# Patient Record
Sex: MALE | Race: WHITE | NOT HISPANIC OR LATINO | Employment: UNEMPLOYED | URBAN - METROPOLITAN AREA
[De-identification: names, ages, dates, MRNs, and addresses within clinical notes are randomized per-mention and may not be internally consistent; named-entity substitution may affect disease eponyms.]

---

## 2022-10-20 ENCOUNTER — HOSPITAL ENCOUNTER (INPATIENT)
Facility: HOSPITAL | Age: 32
LOS: 2 days | Discharge: HOME | DRG: 392 | End: 2022-10-22
Attending: EMERGENCY MEDICINE | Admitting: COLON & RECTAL SURGERY
Payer: MEDICAID

## 2022-10-20 DIAGNOSIS — K57.92 ACUTE DIVERTICULITIS: Primary | ICD-10-CM

## 2022-10-20 DIAGNOSIS — F32.A DEPRESSION, UNSPECIFIED DEPRESSION TYPE: ICD-10-CM

## 2022-10-20 LAB
ALBUMIN SERPL BCP-MCNC: 4.4 G/DL (ref 3.5–5.2)
ALP SERPL-CCNC: 89 U/L (ref 55–135)
ALT SERPL W/O P-5'-P-CCNC: 54 U/L (ref 10–44)
AMPHET+METHAMPHET UR QL: NEGATIVE
ANION GAP SERPL CALC-SCNC: 11 MMOL/L (ref 8–16)
AST SERPL-CCNC: 22 U/L (ref 10–40)
BARBITURATES UR QL SCN>200 NG/ML: NEGATIVE
BASOPHILS # BLD AUTO: 0.09 K/UL (ref 0–0.2)
BASOPHILS NFR BLD: 0.5 % (ref 0–1.9)
BENZODIAZ UR QL SCN>200 NG/ML: NEGATIVE
BILIRUB SERPL-MCNC: 0.7 MG/DL (ref 0.1–1)
BILIRUB UR QL STRIP: NEGATIVE
BUN SERPL-MCNC: 14 MG/DL (ref 6–20)
BZE UR QL SCN: NEGATIVE
CALCIUM SERPL-MCNC: 10.2 MG/DL (ref 8.7–10.5)
CANNABINOIDS UR QL SCN: ABNORMAL
CHLORIDE SERPL-SCNC: 106 MMOL/L (ref 95–110)
CLARITY UR: CLEAR
CO2 SERPL-SCNC: 23 MMOL/L (ref 23–29)
COLOR UR: COLORLESS
CREAT SERPL-MCNC: 1 MG/DL (ref 0.5–1.4)
CREAT UR-MCNC: 43.3 MG/DL (ref 23–375)
DIFFERENTIAL METHOD: ABNORMAL
EOSINOPHIL # BLD AUTO: 0.2 K/UL (ref 0–0.5)
EOSINOPHIL NFR BLD: 1.2 % (ref 0–8)
ERYTHROCYTE [DISTWIDTH] IN BLOOD BY AUTOMATED COUNT: 11.9 % (ref 11.5–14.5)
EST. GFR  (NO RACE VARIABLE): >60 ML/MIN/1.73 M^2
GLUCOSE SERPL-MCNC: 87 MG/DL (ref 70–110)
GLUCOSE UR QL STRIP: NEGATIVE
HCT VFR BLD AUTO: 46.7 % (ref 40–54)
HGB BLD-MCNC: 16.3 G/DL (ref 14–18)
HGB UR QL STRIP: NEGATIVE
IMM GRANULOCYTES # BLD AUTO: 0.05 K/UL (ref 0–0.04)
IMM GRANULOCYTES NFR BLD AUTO: 0.3 % (ref 0–0.5)
KETONES UR QL STRIP: NEGATIVE
LEUKOCYTE ESTERASE UR QL STRIP: NEGATIVE
LIPASE SERPL-CCNC: 37 U/L (ref 4–60)
LYMPHOCYTES # BLD AUTO: 3.4 K/UL (ref 1–4.8)
LYMPHOCYTES NFR BLD: 20.4 % (ref 18–48)
MCH RBC QN AUTO: 29.6 PG (ref 27–31)
MCHC RBC AUTO-ENTMCNC: 34.9 G/DL (ref 32–36)
MCV RBC AUTO: 85 FL (ref 82–98)
METHADONE UR QL SCN>300 NG/ML: NEGATIVE
MONOCYTES # BLD AUTO: 1.2 K/UL (ref 0.3–1)
MONOCYTES NFR BLD: 7 % (ref 4–15)
NEUTROPHILS # BLD AUTO: 11.6 K/UL (ref 1.8–7.7)
NEUTROPHILS NFR BLD: 70.6 % (ref 38–73)
NITRITE UR QL STRIP: NEGATIVE
NRBC BLD-RTO: 0 /100 WBC
OPIATES UR QL SCN: NEGATIVE
PCP UR QL SCN>25 NG/ML: NEGATIVE
PH UR STRIP: 7 [PH] (ref 5–8)
PLATELET # BLD AUTO: 399 K/UL (ref 150–450)
PMV BLD AUTO: 9.4 FL (ref 9.2–12.9)
POCT GLUCOSE: 75 MG/DL (ref 70–110)
POCT GLUCOSE: 91 MG/DL (ref 70–110)
POTASSIUM SERPL-SCNC: 4.2 MMOL/L (ref 3.5–5.1)
PROT SERPL-MCNC: 8.1 G/DL (ref 6–8.4)
PROT UR QL STRIP: NEGATIVE
RBC # BLD AUTO: 5.5 M/UL (ref 4.6–6.2)
SARS-COV-2 RDRP RESP QL NAA+PROBE: NEGATIVE
SODIUM SERPL-SCNC: 140 MMOL/L (ref 136–145)
SP GR UR STRIP: 1.01 (ref 1–1.03)
TOXICOLOGY INFORMATION: ABNORMAL
URN SPEC COLLECT METH UR: ABNORMAL
UROBILINOGEN UR STRIP-ACNC: NEGATIVE EU/DL
WBC # BLD AUTO: 16.46 K/UL (ref 3.9–12.7)

## 2022-10-20 PROCEDURE — 21400001 HC TELEMETRY ROOM

## 2022-10-20 PROCEDURE — 63600175 PHARM REV CODE 636 W HCPCS: Performed by: COLON & RECTAL SURGERY

## 2022-10-20 PROCEDURE — 81003 URINALYSIS AUTO W/O SCOPE: CPT | Mod: 59 | Performed by: NURSE PRACTITIONER

## 2022-10-20 PROCEDURE — 80053 COMPREHEN METABOLIC PANEL: CPT | Performed by: NURSE PRACTITIONER

## 2022-10-20 PROCEDURE — 63600175 PHARM REV CODE 636 W HCPCS: Performed by: NURSE PRACTITIONER

## 2022-10-20 PROCEDURE — 25000003 PHARM REV CODE 250: Performed by: COLON & RECTAL SURGERY

## 2022-10-20 PROCEDURE — 99285 EMERGENCY DEPT VISIT HI MDM: CPT | Mod: 25

## 2022-10-20 PROCEDURE — 99223 1ST HOSP IP/OBS HIGH 75: CPT | Mod: ,,, | Performed by: COLON & RECTAL SURGERY

## 2022-10-20 PROCEDURE — 80307 DRUG TEST PRSMV CHEM ANLYZR: CPT | Performed by: EMERGENCY MEDICINE

## 2022-10-20 PROCEDURE — 63600175 PHARM REV CODE 636 W HCPCS: Performed by: EMERGENCY MEDICINE

## 2022-10-20 PROCEDURE — S0030 INJECTION, METRONIDAZOLE: HCPCS | Performed by: COLON & RECTAL SURGERY

## 2022-10-20 PROCEDURE — 96365 THER/PROPH/DIAG IV INF INIT: CPT

## 2022-10-20 PROCEDURE — U0002 COVID-19 LAB TEST NON-CDC: HCPCS | Performed by: EMERGENCY MEDICINE

## 2022-10-20 PROCEDURE — 25000003 PHARM REV CODE 250: Performed by: EMERGENCY MEDICINE

## 2022-10-20 PROCEDURE — 83690 ASSAY OF LIPASE: CPT | Performed by: NURSE PRACTITIONER

## 2022-10-20 PROCEDURE — 96375 TX/PRO/DX INJ NEW DRUG ADDON: CPT

## 2022-10-20 PROCEDURE — G0378 HOSPITAL OBSERVATION PER HR: HCPCS

## 2022-10-20 PROCEDURE — S0030 INJECTION, METRONIDAZOLE: HCPCS | Performed by: EMERGENCY MEDICINE

## 2022-10-20 PROCEDURE — 99223 PR INITIAL HOSPITAL CARE,LEVL III: ICD-10-PCS | Mod: ,,, | Performed by: COLON & RECTAL SURGERY

## 2022-10-20 PROCEDURE — 96367 TX/PROPH/DG ADDL SEQ IV INF: CPT

## 2022-10-20 PROCEDURE — 96361 HYDRATE IV INFUSION ADD-ON: CPT

## 2022-10-20 PROCEDURE — 85025 COMPLETE CBC W/AUTO DIFF WBC: CPT | Performed by: NURSE PRACTITIONER

## 2022-10-20 RX ORDER — BUPROPION HYDROCHLORIDE 150 MG/1
150 TABLET ORAL EVERY MORNING
COMMUNITY
End: 2023-01-03

## 2022-10-20 RX ORDER — ESCITALOPRAM OXALATE 10 MG/1
10 TABLET ORAL NIGHTLY
Status: DISCONTINUED | OUTPATIENT
Start: 2022-10-20 | End: 2022-10-22 | Stop reason: HOSPADM

## 2022-10-20 RX ORDER — ONDANSETRON 2 MG/ML
4 INJECTION INTRAMUSCULAR; INTRAVENOUS EVERY 6 HOURS PRN
Status: DISCONTINUED | OUTPATIENT
Start: 2022-10-20 | End: 2022-10-22 | Stop reason: HOSPADM

## 2022-10-20 RX ORDER — MORPHINE SULFATE 4 MG/ML
2 INJECTION, SOLUTION INTRAMUSCULAR; INTRAVENOUS
Status: DISCONTINUED | OUTPATIENT
Start: 2022-10-20 | End: 2022-10-21

## 2022-10-20 RX ORDER — KETOROLAC TROMETHAMINE 30 MG/ML
30 INJECTION, SOLUTION INTRAMUSCULAR; INTRAVENOUS
Status: COMPLETED | OUTPATIENT
Start: 2022-10-20 | End: 2022-10-20

## 2022-10-20 RX ORDER — GABAPENTIN 300 MG/1
300 CAPSULE ORAL 3 TIMES DAILY
COMMUNITY
End: 2023-01-03

## 2022-10-20 RX ORDER — SODIUM CHLORIDE, SODIUM LACTATE, POTASSIUM CHLORIDE, CALCIUM CHLORIDE 600; 310; 30; 20 MG/100ML; MG/100ML; MG/100ML; MG/100ML
INJECTION, SOLUTION INTRAVENOUS CONTINUOUS
Status: DISCONTINUED | OUTPATIENT
Start: 2022-10-20 | End: 2022-10-22

## 2022-10-20 RX ORDER — SODIUM CHLORIDE 9 MG/ML
1000 INJECTION, SOLUTION INTRAVENOUS
Status: COMPLETED | OUTPATIENT
Start: 2022-10-20 | End: 2022-10-20

## 2022-10-20 RX ORDER — MORPHINE SULFATE 4 MG/ML
4 INJECTION, SOLUTION INTRAMUSCULAR; INTRAVENOUS
Status: DISCONTINUED | OUTPATIENT
Start: 2022-10-20 | End: 2022-10-21

## 2022-10-20 RX ORDER — TALC
6 POWDER (GRAM) TOPICAL NIGHTLY PRN
Status: DISCONTINUED | OUTPATIENT
Start: 2022-10-20 | End: 2022-10-22 | Stop reason: HOSPADM

## 2022-10-20 RX ORDER — ONDANSETRON 2 MG/ML
4 INJECTION INTRAMUSCULAR; INTRAVENOUS
Status: COMPLETED | OUTPATIENT
Start: 2022-10-20 | End: 2022-10-20

## 2022-10-20 RX ORDER — HYOSCYAMINE SULFATE 0.125 MG
125 TABLET ORAL EVERY 4 HOURS PRN
COMMUNITY
End: 2022-12-20 | Stop reason: SDUPTHER

## 2022-10-20 RX ORDER — CIPROFLOXACIN 2 MG/ML
400 INJECTION, SOLUTION INTRAVENOUS
Status: DISCONTINUED | OUTPATIENT
Start: 2022-10-20 | End: 2022-10-22

## 2022-10-20 RX ORDER — ACETAMINOPHEN 325 MG/1
650 TABLET ORAL EVERY 8 HOURS PRN
Status: DISCONTINUED | OUTPATIENT
Start: 2022-10-20 | End: 2022-10-22 | Stop reason: HOSPADM

## 2022-10-20 RX ORDER — SODIUM CHLORIDE 0.9 % (FLUSH) 0.9 %
10 SYRINGE (ML) INJECTION
Status: DISCONTINUED | OUTPATIENT
Start: 2022-10-20 | End: 2022-10-22 | Stop reason: HOSPADM

## 2022-10-20 RX ORDER — LIDOCAINE HYDROCHLORIDE 10 MG/ML
1 INJECTION INFILTRATION; PERINEURAL ONCE AS NEEDED
Status: DISCONTINUED | OUTPATIENT
Start: 2022-10-20 | End: 2022-10-22 | Stop reason: HOSPADM

## 2022-10-20 RX ORDER — ESCITALOPRAM OXALATE 10 MG/1
10 TABLET ORAL NIGHTLY
COMMUNITY
End: 2023-01-03

## 2022-10-20 RX ORDER — GABAPENTIN 300 MG/1
300 CAPSULE ORAL 3 TIMES DAILY
Status: DISCONTINUED | OUTPATIENT
Start: 2022-10-20 | End: 2022-10-22 | Stop reason: HOSPADM

## 2022-10-20 RX ORDER — MORPHINE SULFATE 4 MG/ML
4 INJECTION, SOLUTION INTRAMUSCULAR; INTRAVENOUS
Status: COMPLETED | OUTPATIENT
Start: 2022-10-20 | End: 2022-10-20

## 2022-10-20 RX ORDER — PRAZOSIN HYDROCHLORIDE 1 MG/1
2 CAPSULE ORAL NIGHTLY
Status: DISCONTINUED | OUTPATIENT
Start: 2022-10-20 | End: 2022-10-22 | Stop reason: HOSPADM

## 2022-10-20 RX ORDER — PRAZOSIN HYDROCHLORIDE 2 MG/1
2 CAPSULE ORAL NIGHTLY
COMMUNITY
End: 2023-01-03

## 2022-10-20 RX ORDER — BUPROPION HYDROCHLORIDE 150 MG/1
150 TABLET ORAL EVERY MORNING
Status: DISCONTINUED | OUTPATIENT
Start: 2022-10-21 | End: 2022-10-22 | Stop reason: HOSPADM

## 2022-10-20 RX ORDER — METOCLOPRAMIDE 5 MG/1
5 TABLET ORAL
COMMUNITY
End: 2022-11-28

## 2022-10-20 RX ORDER — METRONIDAZOLE 500 MG/100ML
500 INJECTION, SOLUTION INTRAVENOUS
Status: DISCONTINUED | OUTPATIENT
Start: 2022-10-20 | End: 2022-10-22 | Stop reason: HOSPADM

## 2022-10-20 RX ADMIN — ONDANSETRON 4 MG: 2 INJECTION INTRAMUSCULAR; INTRAVENOUS at 03:10

## 2022-10-20 RX ADMIN — PRAZOSIN HYDROCHLORIDE 2 MG: 1 CAPSULE ORAL at 08:10

## 2022-10-20 RX ADMIN — METRONIDAZOLE 500 MG: 5 INJECTION, SOLUTION INTRAVENOUS at 12:10

## 2022-10-20 RX ADMIN — KETOROLAC TROMETHAMINE 30 MG: 30 INJECTION, SOLUTION INTRAMUSCULAR; INTRAVENOUS at 11:10

## 2022-10-20 RX ADMIN — MORPHINE SULFATE 4 MG: 4 INJECTION INTRAVENOUS at 06:10

## 2022-10-20 RX ADMIN — ACETAMINOPHEN 650 MG: 325 TABLET ORAL at 08:10

## 2022-10-20 RX ADMIN — ONDANSETRON 4 MG: 2 INJECTION INTRAMUSCULAR; INTRAVENOUS at 08:10

## 2022-10-20 RX ADMIN — METRONIDAZOLE 500 MG: 5 INJECTION, SOLUTION INTRAVENOUS at 08:10

## 2022-10-20 RX ADMIN — SODIUM CHLORIDE 1000 ML: 0.9 INJECTION, SOLUTION INTRAVENOUS at 01:10

## 2022-10-20 RX ADMIN — ONDANSETRON 4 MG: 2 INJECTION INTRAMUSCULAR; INTRAVENOUS at 11:10

## 2022-10-20 RX ADMIN — CIPROFLOXACIN 400 MG: 2 INJECTION, SOLUTION INTRAVENOUS at 12:10

## 2022-10-20 RX ADMIN — SODIUM CHLORIDE 1000 ML: 0.9 INJECTION, SOLUTION INTRAVENOUS at 11:10

## 2022-10-20 RX ADMIN — MORPHINE SULFATE 4 MG: 4 INJECTION INTRAVENOUS at 12:10

## 2022-10-20 RX ADMIN — CIPROFLOXACIN 400 MG: 2 INJECTION, SOLUTION INTRAVENOUS at 11:10

## 2022-10-20 RX ADMIN — MORPHINE SULFATE 4 MG: 4 INJECTION INTRAVENOUS at 03:10

## 2022-10-20 RX ADMIN — ESCITALOPRAM OXALATE 10 MG: 10 TABLET ORAL at 08:10

## 2022-10-20 RX ADMIN — GABAPENTIN 300 MG: 300 CAPSULE ORAL at 08:10

## 2022-10-20 RX ADMIN — SODIUM CHLORIDE, POTASSIUM CHLORIDE, SODIUM LACTATE AND CALCIUM CHLORIDE: 600; 310; 30; 20 INJECTION, SOLUTION INTRAVENOUS at 08:10

## 2022-10-20 NOTE — FIRST PROVIDER EVALUATION
"Medical screening examination initiated.  I have conducted a focused provider triage encounter, findings are as follows:    Brief history of present illness:  reports lower abdominal pain with nausea     Vitals:    10/20/22 1020   BP: (!) 155/104   BP Location: Right arm   Patient Position: Sitting   Pulse: 82   Resp: 20   Temp: 98 °F (36.7 °C)   TempSrc: Oral   SpO2: 99%   Weight: 103.4 kg (227 lb 15.3 oz)   Height: 6' 3" (1.905 m)       Pertinent physical exam:  uncomfortable     Brief workup plan:  labs, meds, imaging     Preliminary workup initiated; this workup will be continued and followed by the physician or advanced practice provider that is assigned to the patient when roomed.  "

## 2022-10-20 NOTE — PHARMACY MED REC
"Admission Medication History     The home medication history was taken by Miguel Angel Willson.    You may go to "Admission" then "Reconcile Home Medications" tabs to review and/or act upon these items.     The home medication list has been updated by the Pharmacy department.   Please read ALL comments highlighted in yellow.   Please address this information as you see fit.    Feel free to contact us if you have any questions or require assistance.        Medications listed below were obtained from: Patient/family  (Not in a hospital admission)      Potential issues to be addressed PRIOR TO DISCHARGE: NONE    Miguel Angel Willson, Ingrid-Adv  Pharmacy Technician Specialist-Medication History  [facility] [phone]  Secure chat preferred     Current Outpatient Medications on File Prior to Encounter   Medication Sig Dispense Refill Last Dose    buPROPion (WELLBUTRIN XL) 150 MG TB24 tablet Take 150 mg by mouth every morning.   10/19/2022    EScitalopram oxalate (LEXAPRO) 10 MG tablet Take 10 mg by mouth every evening.   10/19/2022    gabapentin (NEURONTIN) 300 MG capsule Take 300 mg by mouth 3 (three) times daily.   10/19/2022    hyoscyamine (ANASPAZ,LEVSIN) 0.125 mg Tab Take 125 mcg by mouth every 4 (four) hours as needed (pain).   10/19/2022    metoclopramide HCl (REGLAN) 5 MG tablet Take 5 mg by mouth 2 (two) times daily before meals.   10/19/2022    prazosin (MINIPRESS) 2 MG Cap Take 2 mg by mouth every evening.   10/19/2022                           .        "

## 2022-10-20 NOTE — ASSESSMENT & PLAN NOTE
33yo M with acute perforated sigmoid diverticulitis with small fluid collection    - Admit to surgery  - NPO  - IV fluids  - IV abx  - no acute surgical intervention required at this time.  Patient does not have any peritoneal signs and can likely be treated with IV antibiotics.  If he worsens clinically, we discussed that he could require surgery during this hospitalization although we will try to avoid this.  He is amenable to this plan.  - OOB, ambulation

## 2022-10-20 NOTE — HPI
31yo M who presents emergency room with a 1 day history of left lower quadrant abdominal pain.  Patient states he had acute attack of this pain starting around 2:00 a.m. this morning.  Feels that the pain is sharp and stabbing in nature.  He does report previous history of diverticulitis in the past that is a few years ago.  He denies any fever or chills.  Endorses ongoing nausea and vomiting.  Denies any hematochezia or melena.  Has never had any abdominal surgical history.  Denies any previous colonoscopy.  Denies any family history of colorectal cancer.  Workup in the emergency room concerning for leukocytosis of 16k and a CT scan concerning for complicated sigmoid diverticulitis with an undrainable fluid collection that was 2 cm.

## 2022-10-20 NOTE — PLAN OF CARE
Pt admitted from ED to telemetry bed 214. Report received from SEBASTIAN Curry. Pt AAOx4. No tele monitor orders needed per MD Alonso. On room air; tolerating well. Pt turned and repositioned independently with use of pillows. Pt voids spontaneously without difficulty into urinal at bedside. 20 G PIV in right AC CDI with no redness, swelling, warmth, or drainage with one time dose of NS started in ED infusing at 125 ml/hr; hang LR gtt after current bag of NS finishes per MD Alonso; will notify night shift nurse. Bed low, wheels locked, alarms audible. Plan of care reviewed. Vital signs monitored. Fall precautions in place. Pain assessed. Safety promoted. Infection risks reduced.

## 2022-10-20 NOTE — ED PROVIDER NOTES
"SCRIBE #1 NOTE: I, Viridiana Eamon, am scribing for, and in the presence of, Elenita Chisholm MD. I have scribed the entire note.       History     Chief Complaint   Patient presents with    Abdominal Pain     Pt c/o lower middle abd pain and LLQ abd pain, +nausea, began last night, hx gastroparesis but states this pain is different     Review of patient's allergies indicates:  No Known Allergies      History of Present Illness     HPI    10/20/2022, 11:09 AM  History obtained from the patient      History of Present Illness: David Sevilla is a 32 y.o. male patient  who presents to the Emergency Department for evaluation of LLQ abdominal pain  which onset suddenly around 2AM. Pt describes pain as "feels like he has been stabbed" and a pressure.  Pt states he has a history of diverticulitis. Symptoms are constant and moderate in severity. No mitigating or exacerbating factors reported. Associated sxs include N/V and dysuria. Patient denies any fever, chills, difficulty urinating, blood in stool, hematuria , and all other sxs at this time. No prior Tx reported. No further complaints or concerns at this time.       Arrival mode: Personal vehicle      PCP: Primary Doctor No        Past Medical History:  No past medical history on file.    Past Surgical History:  No past surgical history on file.      Family History:  No family history on file.    Social History:  Social History     Tobacco Use    Smoking status: Not on file    Smokeless tobacco: Not on file   Substance and Sexual Activity    Alcohol use: Not on file    Drug use: Not on file    Sexual activity: Not on file        Review of Systems     Review of Systems   Constitutional:  Negative for chills and fever.   HENT:  Negative for sore throat.    Respiratory:  Negative for shortness of breath.    Cardiovascular:  Negative for chest pain.   Gastrointestinal:  Positive for abdominal pain (LLQ), nausea and vomiting. Negative for blood in stool. " "  Genitourinary:  Positive for dysuria. Negative for difficulty urinating and hematuria.   Musculoskeletal:  Negative for back pain.   Skin:  Negative for rash.   Neurological:  Negative for weakness.   Hematological:  Does not bruise/bleed easily.   All other systems reviewed and are negative.     Physical Exam     Initial Vitals [10/20/22 1020]   BP Pulse Resp Temp SpO2   (!) 155/104 82 20 98 °F (36.7 °C) 99 %      MAP       --          Physical Exam  Nursing Notes and Vital Signs Reviewed.  Constitutional: Patient is in no acute distress. Well-developed and well-nourished.  Head: Atraumatic. Normocephalic.  Eyes: PERRL. EOM intact. Conjunctivae are not pale. No scleral icterus.  ENT: Mucous membranes are moist. Oropharynx is clear and symmetric.    Neck: Supple. Full ROM. No lymphadenopathy.  Cardiovascular: Regular rate. Regular rhythm. No murmurs, rubs, or gallops. Distal pulses are 2+ and symmetric.  Pulmonary/Chest: No respiratory distress. Clear to auscultation bilaterally. No wheezing or rales.  Abdominal: Soft and non-distended. No rebound, guarding, or rigidity. LLQ tenderness.  Genitourinary: No CVA tenderness  Musculoskeletal: Moves all extremities. No obvious deformities. No edema. No calf tenderness.  Skin: Warm and dry.  Neurological:  Alert, awake, and appropriate.  Normal speech.  No acute focal neurological deficits are appreciated.  Psychiatric: Normal affect. Good eye contact. Appropriate in content.     ED Course   Procedures  ED Vital Signs:  Vitals:    10/20/22 1020 10/20/22 1130 10/20/22 1230 10/20/22 1240   BP: (!) 155/104 116/65 132/72    Pulse: 82 74 77    Resp: 20 18 18 20   Temp: 98 °F (36.7 °C)      TempSrc: Oral      SpO2: 99% 100% 100%    Weight: 103.4 kg (227 lb 15.3 oz)      Height: 6' 3" (1.905 m)       10/20/22 1300 10/20/22 1400   BP: (!) 141/81 123/74   Pulse: 80 82   Resp: 20 18   Temp:     TempSrc:     SpO2: 100% 95%   Weight:     Height:         Abnormal Lab Results:  Labs " Reviewed   CBC W/ AUTO DIFFERENTIAL - Abnormal; Notable for the following components:       Result Value    WBC 16.46 (*)     Gran # (ANC) 11.6 (*)     Immature Grans (Abs) 0.05 (*)     Mono # 1.2 (*)     All other components within normal limits   COMPREHENSIVE METABOLIC PANEL - Abnormal; Notable for the following components:    ALT 54 (*)     All other components within normal limits   URINALYSIS, REFLEX TO URINE CULTURE - Abnormal; Notable for the following components:    Color, UA Colorless (*)     All other components within normal limits    Narrative:     Specimen Source->Urine   DRUG SCREEN PANEL, URINE EMERGENCY - Abnormal; Notable for the following components:    THC Presumptive Positive (*)     All other components within normal limits    Narrative:     Specimen Source->Urine   LIPASE   SARS-COV-2 RNA AMPLIFICATION, QUAL        All Lab Results:  Results for orders placed or performed during the hospital encounter of 10/20/22   CBC auto differential   Result Value Ref Range    WBC 16.46 (H) 3.90 - 12.70 K/uL    RBC 5.50 4.60 - 6.20 M/uL    Hemoglobin 16.3 14.0 - 18.0 g/dL    Hematocrit 46.7 40.0 - 54.0 %    MCV 85 82 - 98 fL    MCH 29.6 27.0 - 31.0 pg    MCHC 34.9 32.0 - 36.0 g/dL    RDW 11.9 11.5 - 14.5 %    Platelets 399 150 - 450 K/uL    MPV 9.4 9.2 - 12.9 fL    Immature Granulocytes 0.3 0.0 - 0.5 %    Gran # (ANC) 11.6 (H) 1.8 - 7.7 K/uL    Immature Grans (Abs) 0.05 (H) 0.00 - 0.04 K/uL    Lymph # 3.4 1.0 - 4.8 K/uL    Mono # 1.2 (H) 0.3 - 1.0 K/uL    Eos # 0.2 0.0 - 0.5 K/uL    Baso # 0.09 0.00 - 0.20 K/uL    nRBC 0 0 /100 WBC    Gran % 70.6 38.0 - 73.0 %    Lymph % 20.4 18.0 - 48.0 %    Mono % 7.0 4.0 - 15.0 %    Eosinophil % 1.2 0.0 - 8.0 %    Basophil % 0.5 0.0 - 1.9 %    Differential Method Automated    Comprehensive metabolic panel   Result Value Ref Range    Sodium 140 136 - 145 mmol/L    Potassium 4.2 3.5 - 5.1 mmol/L    Chloride 106 95 - 110 mmol/L    CO2 23 23 - 29 mmol/L    Glucose 87 70 -  110 mg/dL    BUN 14 6 - 20 mg/dL    Creatinine 1.0 0.5 - 1.4 mg/dL    Calcium 10.2 8.7 - 10.5 mg/dL    Total Protein 8.1 6.0 - 8.4 g/dL    Albumin 4.4 3.5 - 5.2 g/dL    Total Bilirubin 0.7 0.1 - 1.0 mg/dL    Alkaline Phosphatase 89 55 - 135 U/L    AST 22 10 - 40 U/L    ALT 54 (H) 10 - 44 U/L    Anion Gap 11 8 - 16 mmol/L    eGFR >60 >60 mL/min/1.73 m^2   Lipase   Result Value Ref Range    Lipase 37 4 - 60 U/L   Urinalysis, Reflex to Urine Culture Urine, Clean Catch    Specimen: Urine   Result Value Ref Range    Specimen UA Urine, Clean Catch     Color, UA Colorless (A) Yellow, Straw, Phuong    Appearance, UA Clear Clear    pH, UA 7.0 5.0 - 8.0    Specific Gravity, UA 1.010 1.005 - 1.030    Protein, UA Negative Negative    Glucose, UA Negative Negative    Ketones, UA Negative Negative    Bilirubin (UA) Negative Negative    Occult Blood UA Negative Negative    Nitrite, UA Negative Negative    Urobilinogen, UA Negative <2.0 EU/dL    Leukocytes, UA Negative Negative   Drug screen panel, emergency   Result Value Ref Range    Benzodiazepines Negative Negative    Methadone metabolites Negative Negative    Cocaine (Metab.) Negative Negative    Opiate Scrn, Ur Negative Negative    Barbiturate Screen, Ur Negative Negative    Amphetamine Screen, Ur Negative Negative    THC Presumptive Positive (A) Negative    Phencyclidine Negative Negative    Creatinine, Urine 43.3 23.0 - 375.0 mg/dL    Toxicology Information SEE COMMENT    COVID-19 Rapid Screening   Result Value Ref Range    SARS-CoV-2 RNA, Amplification, Qual Negative Negative         Imaging Results:  Imaging Results              CT Renal Stone Study ABD Pelvis WO (Final result)  Result time 10/20/22 12:14:09      Final result by Dmitri Lebron MD (10/20/22 12:14:09)                   Impression:      Extensive descending and sigmoid diverticulosis with thickening inflammatory changes within the proximal sigmoid colon consistent with acute diverticulitis. No drainable  fluid collection. Small amount of fluid seen adjacent to the sigmoid colon measuring 2.2 x 1.6 cm. Recommend surgery consult and interval follow-up imaging with oral and IV contrast.    Evaluation of solid organ and vascular pathology is limited due to lack of IV contrast.    All CT scans at this facility use dose modulation, iterative reconstruction, and/or weight based dosing when appropriate to reduce radiation dose to as low as reasonable achievable.      Electronically signed by: Dmitri Lebron MD  Date:    10/20/2022  Time:    12:14               Narrative:    EXAMINATION:  CT RENAL STONE STUDY ABD PELVIS WO    CLINICAL HISTORY:  Flank pain, kidney stone suspected;    TECHNIQUE:  Low dose axial images, sagittal and coronal reformations were obtained from the lung bases to the pubic symphysis.  Oral contrast was not administered.    COMPARISON:  None    FINDINGS:  Heart: Normal size. No effusion.    Lung Bases: Clear.    Liver: Normal size.  Decreased attenuation consistent with hepatic steatosis..  No focal lesions.    Gallbladder: No calcified gallstones.    Bile Ducts: No dilatation.    Pancreas: No obvious mass. No peripancreatic fat stranding.    Spleen: Normal.    Adrenals: Normal.    Kidneys/Ureters: No mass, hydroureteronephrosis, or nephroureterolithiasis.    Bladder: No wall thickening.    Reproductive organs: Normal.    GI Tract/Mesentery: No evidence of bowel obstruction.  Extensive descending and sigmoid diverticulosis with thickening inflammatory changes within the proximal sigmoid colon consistent with acute diverticulitis.  No drainable fluid collection.  Small amount of fluid seen adjacent to the sigmoid colon measuring 2.2 x 1.6 cm.    Peritoneal Space: No ascites or free air.    Retroperitoneum: No significant adenopathy.    Abdominal wall: Normal.    Vasculature: No aneurysm.    Bones: No acute fracture. No suspicious lytic or sclerotic lesions.                                               The Emergency Provider reviewed the vital signs and test results, which are outlined above.     ED Discussion     12:53 PM: Discussed pt's case with Dr. Alonso (Colon and Rectal Surgery) who recommends admit observation, NPO, with IV abx (zosyn).    12:55 PM: Discussed case with Dr. Alonso (Colon and rectal surgery). Dr. Alonso agrees with current care and management of pt and accepts admission.   Admitting Service: Colon and rectal surgery   Admitting Physician: Dr. Alonso  Admit to: Obs     12:57 PM: Re-evaluated pt. I have discussed test results, shared treatment plan, and the need for admission with patient and family at bedside. Pt and family express understanding at this time and agree with all information. All questions answered. Pt and family have no further questions or concerns at this time. Pt is ready for admit.           Medical Decision Making:   Clinical Tests:   Lab Tests: Ordered and Reviewed  Radiological Study: Ordered and Reviewed         ED Medication(s):  Medications   ciprofloxacin (CIPRO)400mg/200ml D5W IVPB 400 mg (0 mg Intravenous Stopped 10/20/22 1334)   metronidazole IVPB 500 mg (0 mg Intravenous Stopped 10/20/22 1343)   ondansetron injection 4 mg (4 mg Intravenous Given 10/20/22 1121)   sodium chloride 0.9% bolus 1,000 mL (0 mLs Intravenous Stopped 10/20/22 1221)   ketorolac injection 30 mg (30 mg Intravenous Given 10/20/22 1121)   morphine injection 4 mg (4 mg Intravenous Given 10/20/22 1240)   0.9%  NaCl infusion (1,000 mLs Intravenous New Bag 10/20/22 1311)       New Prescriptions    No medications on file               Scribe Attestation:   Scribe #1: I performed the above scribed service and the documentation accurately describes the services I performed. I attest to the accuracy of the note.     Attending:   Physician Attestation Statement for Scribe #1: I, Elenita Chisholm MD, personally performed the services described in this documentation, as scribed by Viridiana Knutson, in my  presence, and it is both accurate and complete.           Clinical Impression       ICD-10-CM ICD-9-CM   1. Acute diverticulitis  K57.92 562.11       Disposition:   Disposition: Placed in Observation  Condition: Fair       Elenita Chisholm MD  10/20/22 6884

## 2022-10-20 NOTE — ED NOTES
Pt in NAD, Pt reports pain 5/10 and states his pain is tolerable at this time. VSS, Resp e/u. Stretcher locked in lowest position. Side rails up x2. Call bell within reach. Will continue to monitor.

## 2022-10-20 NOTE — SUBJECTIVE & OBJECTIVE
No current facility-administered medications on file prior to encounter.     Current Outpatient Medications on File Prior to Encounter   Medication Sig    buPROPion (WELLBUTRIN XL) 150 MG TB24 tablet Take 150 mg by mouth every morning.    EScitalopram oxalate (LEXAPRO) 10 MG tablet Take 10 mg by mouth every evening.    gabapentin (NEURONTIN) 300 MG capsule Take 300 mg by mouth 3 (three) times daily.    hyoscyamine (ANASPAZ,LEVSIN) 0.125 mg Tab Take 125 mcg by mouth every 4 (four) hours as needed (pain).    metoclopramide HCl (REGLAN) 5 MG tablet Take 5 mg by mouth 2 (two) times daily before meals.    prazosin (MINIPRESS) 2 MG Cap Take 2 mg by mouth every evening.       Review of patient's allergies indicates:  No Known Allergies    No past medical history on file.  No past surgical history on file.  Family History    None       Tobacco Use    Smoking status: Not on file    Smokeless tobacco: Not on file   Substance and Sexual Activity    Alcohol use: Not on file    Drug use: Not on file    Sexual activity: Not on file     Review of Systems   Constitutional:  Negative for activity change, appetite change, chills, fatigue, fever and unexpected weight change.   HENT:  Negative for congestion, ear pain, sore throat and trouble swallowing.    Eyes:  Negative for pain, redness and itching.   Respiratory:  Negative for cough, shortness of breath and wheezing.    Cardiovascular:  Negative for chest pain, palpitations and leg swelling.   Gastrointestinal:  Positive for abdominal pain, nausea and vomiting. Negative for abdominal distention, anal bleeding, blood in stool and rectal pain.   Endocrine: Negative for cold intolerance, heat intolerance and polyuria.   Genitourinary:  Negative for dysuria, flank pain, frequency and hematuria.   Musculoskeletal:  Negative for gait problem, joint swelling and neck pain.   Skin:  Negative for color change, rash and wound.   Allergic/Immunologic: Negative for environmental allergies and  immunocompromised state.   Neurological:  Negative for dizziness, speech difficulty, weakness and numbness.   Psychiatric/Behavioral:  Negative for agitation, confusion and hallucinations.    Objective:     Vital Signs (Most Recent):  Temp: 98 °F (36.7 °C) (10/20/22 1020)  Pulse: 82 (10/20/22 1400)  Resp: 18 (10/20/22 1400)  BP: 123/74 (10/20/22 1400)  SpO2: 95 % (10/20/22 1400) Vital Signs (24h Range):  Temp:  [98 °F (36.7 °C)] 98 °F (36.7 °C)  Pulse:  [74-82] 82  Resp:  [18-20] 18  SpO2:  [95 %-100 %] 95 %  BP: (116-155)/() 123/74     Weight: 103.4 kg (227 lb 15.3 oz)  Body mass index is 28.49 kg/m².    Physical Exam  Constitutional:       Appearance: He is well-developed.   HENT:      Head: Normocephalic and atraumatic.   Eyes:      Conjunctiva/sclera: Conjunctivae normal.   Neck:      Thyroid: No thyromegaly.   Cardiovascular:      Rate and Rhythm: Normal rate and regular rhythm.   Pulmonary:      Effort: Pulmonary effort is normal. No respiratory distress.   Abdominal:      Comments: Soft, nondistended, +TTP LLQ and suprapubic; no rebound or guarding; no previous incisions   Musculoskeletal:         General: No tenderness. Normal range of motion.      Cervical back: Normal range of motion.   Skin:     General: Skin is warm and dry.      Capillary Refill: Capillary refill takes less than 2 seconds.      Findings: No rash.   Neurological:      Mental Status: He is alert and oriented to person, place, and time.       Significant Labs:  I have reviewed all pertinent lab results within the past 24 hours.  CBC:   Recent Labs   Lab 10/20/22  1120   WBC 16.46*   RBC 5.50   HGB 16.3   HCT 46.7      MCV 85   MCH 29.6   MCHC 34.9     BMP:   Recent Labs   Lab 10/20/22  1120   GLU 87      K 4.2      CO2 23   BUN 14   CREATININE 1.0   CALCIUM 10.2     CMP:   Recent Labs   Lab 10/20/22  1120   GLU 87   CALCIUM 10.2   ALBUMIN 4.4   PROT 8.1      K 4.2   CO2 23      BUN 14   CREATININE 1.0    ALKPHOS 89   ALT 54*   AST 22   BILITOT 0.7     LFTs:   Recent Labs   Lab 10/20/22  1120   ALT 54*   AST 22   ALKPHOS 89   BILITOT 0.7   PROT 8.1   ALBUMIN 4.4       Significant Diagnostics:  I have reviewed all pertinent imaging results/findings within the past 24 hours.    CT:  FINDINGS:  Heart: Normal size. No effusion.     Lung Bases: Clear.     Liver: Normal size.  Decreased attenuation consistent with hepatic steatosis..  No focal lesions.     Gallbladder: No calcified gallstones.     Bile Ducts: No dilatation.     Pancreas: No obvious mass. No peripancreatic fat stranding.     Spleen: Normal.     Adrenals: Normal.     Kidneys/Ureters: No mass, hydroureteronephrosis, or nephroureterolithiasis.     Bladder: No wall thickening.     Reproductive organs: Normal.     GI Tract/Mesentery: No evidence of bowel obstruction.  Extensive descending and sigmoid diverticulosis with thickening inflammatory changes within the proximal sigmoid colon consistent with acute diverticulitis.  No drainable fluid collection.  Small amount of fluid seen adjacent to the sigmoid colon measuring 2.2 x 1.6 cm.     Peritoneal Space: No ascites or free air.     Retroperitoneum: No significant adenopathy.     Abdominal wall: Normal.     Vasculature: No aneurysm.     Bones: No acute fracture. No suspicious lytic or sclerotic lesions.     Impression:     Extensive descending and sigmoid diverticulosis with thickening inflammatory changes within the proximal sigmoid colon consistent with acute diverticulitis. No drainable fluid collection. Small amount of fluid seen adjacent to the sigmoid colon measuring 2.2 x 1.6 cm.

## 2022-10-20 NOTE — H&P
[facility] - Emergency Dept.  Colorectal Surgery  History & Physical    Patient Name: David Sevilla  MRN: [id number]  Admission Date: 10/20/2022  Attending Physician: Prasad Alonso MD  Primary Care Provider: Primary Doctor No    Patient information was obtained from patient, past medical records and ER records.     Subjective:     Chief Complaint/Reason for Admission: Acute sigmoid diverticulitis    History of Present Illness: 33yo M who presents emergency room with a 1 day history of left lower quadrant abdominal pain.  Patient states he had acute attack of this pain starting around 2:00 a.m. this morning.  Feels that the pain is sharp and stabbing in nature.  He does report previous history of diverticulitis in the past that is a few years ago.  He denies any fever or chills.  Endorses ongoing nausea and vomiting.  Denies any hematochezia or melena.  Has never had any abdominal surgical history.  Denies any previous colonoscopy.  Denies any family history of colorectal cancer.  Workup in the emergency room concerning for leukocytosis of 16k and a CT scan concerning for complicated sigmoid diverticulitis with an undrainable fluid collection that was 2 cm.      No current facility-administered medications on file prior to encounter.     Current Outpatient Medications on File Prior to Encounter   Medication Sig    buPROPion (WELLBUTRIN XL) 150 MG TB24 tablet Take 150 mg by mouth every morning.    EScitalopram oxalate (LEXAPRO) 10 MG tablet Take 10 mg by mouth every evening.    gabapentin (NEURONTIN) 300 MG capsule Take 300 mg by mouth 3 (three) times daily.    hyoscyamine (ANASPAZ,LEVSIN) 0.125 mg Tab Take 125 mcg by mouth every 4 (four) hours as needed (pain).    metoclopramide HCl (REGLAN) 5 MG tablet Take 5 mg by mouth 2 (two) times daily before meals.    prazosin (MINIPRESS) 2 MG Cap Take 2 mg by mouth every evening.       Review of patient's allergies indicates:  No Known Allergies    No past  medical history on file.  No past surgical history on file.  Family History    None       Tobacco Use    Smoking status: Not on file    Smokeless tobacco: Not on file   Substance and Sexual Activity    Alcohol use: Not on file    Drug use: Not on file    Sexual activity: Not on file     Review of Systems   Constitutional:  Negative for activity change, appetite change, chills, fatigue, fever and unexpected weight change.   HENT:  Negative for congestion, ear pain, sore throat and trouble swallowing.    Eyes:  Negative for pain, redness and itching.   Respiratory:  Negative for cough, shortness of breath and wheezing.    Cardiovascular:  Negative for chest pain, palpitations and leg swelling.   Gastrointestinal:  Positive for abdominal pain, nausea and vomiting. Negative for abdominal distention, anal bleeding, blood in stool and rectal pain.   Endocrine: Negative for cold intolerance, heat intolerance and polyuria.   Genitourinary:  Negative for dysuria, flank pain, frequency and hematuria.   Musculoskeletal:  Negative for gait problem, joint swelling and neck pain.   Skin:  Negative for color change, rash and wound.   Allergic/Immunologic: Negative for environmental allergies and immunocompromised state.   Neurological:  Negative for dizziness, speech difficulty, weakness and numbness.   Psychiatric/Behavioral:  Negative for agitation, confusion and hallucinations.    Objective:     Vital Signs (Most Recent):  Temp: 98 °F (36.7 °C) (10/20/22 1020)  Pulse: 82 (10/20/22 1400)  Resp: 18 (10/20/22 1400)  BP: 123/74 (10/20/22 1400)  SpO2: 95 % (10/20/22 1400) Vital Signs (24h Range):  Temp:  [98 °F (36.7 °C)] 98 °F (36.7 °C)  Pulse:  [74-82] 82  Resp:  [18-20] 18  SpO2:  [95 %-100 %] 95 %  BP: (116-155)/() 123/74     Weight: 103.4 kg (227 lb 15.3 oz)  Body mass index is 28.49 kg/m².    Physical Exam  Constitutional:       Appearance: He is well-developed.   HENT:      Head: Normocephalic and atraumatic.    Eyes:      Conjunctiva/sclera: Conjunctivae normal.   Neck:      Thyroid: No thyromegaly.   Cardiovascular:      Rate and Rhythm: Normal rate and regular rhythm.   Pulmonary:      Effort: Pulmonary effort is normal. No respiratory distress.   Abdominal:      Comments: Soft, nondistended, +TTP LLQ and suprapubic; no rebound or guarding; no previous incisions   Musculoskeletal:         General: No tenderness. Normal range of motion.      Cervical back: Normal range of motion.   Skin:     General: Skin is warm and dry.      Capillary Refill: Capillary refill takes less than 2 seconds.      Findings: No rash.   Neurological:      Mental Status: He is alert and oriented to person, place, and time.       Significant Labs:  I have reviewed all pertinent lab results within the past 24 hours.  CBC:   Recent Labs   Lab 10/20/22  1120   WBC 16.46*   RBC 5.50   HGB 16.3   HCT 46.7      MCV 85   MCH 29.6   MCHC 34.9     BMP:   Recent Labs   Lab 10/20/22  1120   GLU 87      K 4.2      CO2 23   BUN 14   CREATININE 1.0   CALCIUM 10.2     CMP:   Recent Labs   Lab 10/20/22  1120   GLU 87   CALCIUM 10.2   ALBUMIN 4.4   PROT 8.1      K 4.2   CO2 23      BUN 14   CREATININE 1.0   ALKPHOS 89   ALT 54*   AST 22   BILITOT 0.7     LFTs:   Recent Labs   Lab 10/20/22  1120   ALT 54*   AST 22   ALKPHOS 89   BILITOT 0.7   PROT 8.1   ALBUMIN 4.4       Significant Diagnostics:  I have reviewed all pertinent imaging results/findings within the past 24 hours.    CT:  FINDINGS:  Heart: Normal size. No effusion.     Lung Bases: Clear.     Liver: Normal size.  Decreased attenuation consistent with hepatic steatosis..  No focal lesions.     Gallbladder: No calcified gallstones.     Bile Ducts: No dilatation.     Pancreas: No obvious mass. No peripancreatic fat stranding.     Spleen: Normal.     Adrenals: Normal.     Kidneys/Ureters: No mass, hydroureteronephrosis, or nephroureterolithiasis.     Bladder: No wall  thickening.     Reproductive organs: Normal.     GI Tract/Mesentery: No evidence of bowel obstruction.  Extensive descending and sigmoid diverticulosis with thickening inflammatory changes within the proximal sigmoid colon consistent with acute diverticulitis.  No drainable fluid collection.  Small amount of fluid seen adjacent to the sigmoid colon measuring 2.2 x 1.6 cm.     Peritoneal Space: No ascites or free air.     Retroperitoneum: No significant adenopathy.     Abdominal wall: Normal.     Vasculature: No aneurysm.     Bones: No acute fracture. No suspicious lytic or sclerotic lesions.     Impression:     Extensive descending and sigmoid diverticulosis with thickening inflammatory changes within the proximal sigmoid colon consistent with acute diverticulitis. No drainable fluid collection. Small amount of fluid seen adjacent to the sigmoid colon measuring 2.2 x 1.6 cm.      Assessment/Plan:     Acute diverticulitis  31yo M with acute perforated sigmoid diverticulitis with small fluid collection    - Admit to surgery  - NPO  - IV fluids  - IV abx  - no acute surgical intervention required at this time.  Patient does not have any peritoneal signs and can likely be treated with IV antibiotics.  If he worsens clinically, we discussed that he could require surgery during this hospitalization although we will try to avoid this.  He is amenable to this plan.  - OOB, ambulation    Depression  Stable, continue appropriate home meds      VTE Risk Mitigation (From admission, onward)    None          Prasad Alonso MD  Colorectal Surgery  [facility] - Emergency Dept.

## 2022-10-21 LAB
ANION GAP SERPL CALC-SCNC: 9 MMOL/L (ref 8–16)
BASOPHILS # BLD AUTO: 0.04 K/UL (ref 0–0.2)
BASOPHILS NFR BLD: 0.4 % (ref 0–1.9)
BUN SERPL-MCNC: 11 MG/DL (ref 6–20)
CALCIUM SERPL-MCNC: 9.6 MG/DL (ref 8.7–10.5)
CHLORIDE SERPL-SCNC: 106 MMOL/L (ref 95–110)
CO2 SERPL-SCNC: 25 MMOL/L (ref 23–29)
CREAT SERPL-MCNC: 1 MG/DL (ref 0.5–1.4)
DIFFERENTIAL METHOD: ABNORMAL
EOSINOPHIL # BLD AUTO: 0.1 K/UL (ref 0–0.5)
EOSINOPHIL NFR BLD: 1 % (ref 0–8)
ERYTHROCYTE [DISTWIDTH] IN BLOOD BY AUTOMATED COUNT: 11.9 % (ref 11.5–14.5)
EST. GFR  (NO RACE VARIABLE): >60 ML/MIN/1.73 M^2
GLUCOSE SERPL-MCNC: 94 MG/DL (ref 70–110)
HCT VFR BLD AUTO: 42.5 % (ref 40–54)
HGB BLD-MCNC: 14 G/DL (ref 14–18)
IMM GRANULOCYTES # BLD AUTO: 0.13 K/UL (ref 0–0.04)
IMM GRANULOCYTES NFR BLD AUTO: 1.3 % (ref 0–0.5)
LYMPHOCYTES # BLD AUTO: 2.5 K/UL (ref 1–4.8)
LYMPHOCYTES NFR BLD: 25.4 % (ref 18–48)
MAGNESIUM SERPL-MCNC: 1.8 MG/DL (ref 1.6–2.6)
MCH RBC QN AUTO: 28.9 PG (ref 27–31)
MCHC RBC AUTO-ENTMCNC: 32.9 G/DL (ref 32–36)
MCV RBC AUTO: 88 FL (ref 82–98)
MONOCYTES # BLD AUTO: 0.9 K/UL (ref 0.3–1)
MONOCYTES NFR BLD: 9 % (ref 4–15)
NEUTROPHILS # BLD AUTO: 6.1 K/UL (ref 1.8–7.7)
NEUTROPHILS NFR BLD: 62.9 % (ref 38–73)
NRBC BLD-RTO: 0 /100 WBC
PHOSPHATE SERPL-MCNC: 3.9 MG/DL (ref 2.7–4.5)
PLATELET # BLD AUTO: 358 K/UL (ref 150–450)
PMV BLD AUTO: 10 FL (ref 9.2–12.9)
POTASSIUM SERPL-SCNC: 4.4 MMOL/L (ref 3.5–5.1)
RBC # BLD AUTO: 4.84 M/UL (ref 4.6–6.2)
SODIUM SERPL-SCNC: 140 MMOL/L (ref 136–145)
WBC # BLD AUTO: 9.7 K/UL (ref 3.9–12.7)

## 2022-10-21 PROCEDURE — 99232 SBSQ HOSP IP/OBS MODERATE 35: CPT | Mod: ,,, | Performed by: COLON & RECTAL SURGERY

## 2022-10-21 PROCEDURE — 84100 ASSAY OF PHOSPHORUS: CPT | Performed by: COLON & RECTAL SURGERY

## 2022-10-21 PROCEDURE — 63600175 PHARM REV CODE 636 W HCPCS: Performed by: NURSE PRACTITIONER

## 2022-10-21 PROCEDURE — 25000003 PHARM REV CODE 250: Performed by: COLON & RECTAL SURGERY

## 2022-10-21 PROCEDURE — 80048 BASIC METABOLIC PNL TOTAL CA: CPT | Performed by: COLON & RECTAL SURGERY

## 2022-10-21 PROCEDURE — 63600175 PHARM REV CODE 636 W HCPCS: Performed by: COLON & RECTAL SURGERY

## 2022-10-21 PROCEDURE — 36415 COLL VENOUS BLD VENIPUNCTURE: CPT | Performed by: COLON & RECTAL SURGERY

## 2022-10-21 PROCEDURE — S0030 INJECTION, METRONIDAZOLE: HCPCS | Performed by: COLON & RECTAL SURGERY

## 2022-10-21 PROCEDURE — 85025 COMPLETE CBC W/AUTO DIFF WBC: CPT | Performed by: COLON & RECTAL SURGERY

## 2022-10-21 PROCEDURE — 25000003 PHARM REV CODE 250: Performed by: NURSE PRACTITIONER

## 2022-10-21 PROCEDURE — 21400001 HC TELEMETRY ROOM

## 2022-10-21 PROCEDURE — 83735 ASSAY OF MAGNESIUM: CPT | Performed by: COLON & RECTAL SURGERY

## 2022-10-21 PROCEDURE — 99232 PR SUBSEQUENT HOSPITAL CARE,LEVL II: ICD-10-PCS | Mod: ,,, | Performed by: COLON & RECTAL SURGERY

## 2022-10-21 RX ORDER — OXYCODONE HYDROCHLORIDE 5 MG/1
5 TABLET ORAL EVERY 4 HOURS PRN
Status: DISCONTINUED | OUTPATIENT
Start: 2022-10-21 | End: 2022-10-22 | Stop reason: HOSPADM

## 2022-10-21 RX ORDER — OXYCODONE HYDROCHLORIDE 5 MG/1
10 TABLET ORAL EVERY 4 HOURS PRN
Status: DISCONTINUED | OUTPATIENT
Start: 2022-10-21 | End: 2022-10-22 | Stop reason: HOSPADM

## 2022-10-21 RX ORDER — HYDROMORPHONE HYDROCHLORIDE 1 MG/ML
0.5 INJECTION, SOLUTION INTRAMUSCULAR; INTRAVENOUS; SUBCUTANEOUS
Status: DISCONTINUED | OUTPATIENT
Start: 2022-10-21 | End: 2022-10-22 | Stop reason: HOSPADM

## 2022-10-21 RX ORDER — HYDROMORPHONE HYDROCHLORIDE 1 MG/ML
1 INJECTION, SOLUTION INTRAMUSCULAR; INTRAVENOUS; SUBCUTANEOUS ONCE
Status: COMPLETED | OUTPATIENT
Start: 2022-10-21 | End: 2022-10-21

## 2022-10-21 RX ORDER — OXYCODONE AND ACETAMINOPHEN 10; 325 MG/1; MG/1
1 TABLET ORAL EVERY 6 HOURS PRN
Status: DISCONTINUED | OUTPATIENT
Start: 2022-10-21 | End: 2022-10-21

## 2022-10-21 RX ADMIN — Medication 6 MG: at 08:10

## 2022-10-21 RX ADMIN — PRAZOSIN HYDROCHLORIDE 2 MG: 1 CAPSULE ORAL at 08:10

## 2022-10-21 RX ADMIN — OXYCODONE HYDROCHLORIDE 10 MG: 5 TABLET ORAL at 01:10

## 2022-10-21 RX ADMIN — OXYCODONE HYDROCHLORIDE 5 MG: 5 TABLET ORAL at 08:10

## 2022-10-21 RX ADMIN — ONDANSETRON 4 MG: 2 INJECTION INTRAMUSCULAR; INTRAVENOUS at 08:10

## 2022-10-21 RX ADMIN — METRONIDAZOLE 500 MG: 5 INJECTION, SOLUTION INTRAVENOUS at 05:10

## 2022-10-21 RX ADMIN — PROMETHAZINE HYDROCHLORIDE 6.25 MG: 25 INJECTION INTRAMUSCULAR; INTRAVENOUS at 12:10

## 2022-10-21 RX ADMIN — SODIUM CHLORIDE, POTASSIUM CHLORIDE, SODIUM LACTATE AND CALCIUM CHLORIDE: 600; 310; 30; 20 INJECTION, SOLUTION INTRAVENOUS at 08:10

## 2022-10-21 RX ADMIN — HYDROMORPHONE HYDROCHLORIDE 1 MG: 1 INJECTION, SOLUTION INTRAMUSCULAR; INTRAVENOUS; SUBCUTANEOUS at 01:10

## 2022-10-21 RX ADMIN — ESCITALOPRAM OXALATE 10 MG: 10 TABLET ORAL at 08:10

## 2022-10-21 RX ADMIN — GABAPENTIN 300 MG: 300 CAPSULE ORAL at 08:10

## 2022-10-21 RX ADMIN — PROMETHAZINE HYDROCHLORIDE 6.25 MG: 25 INJECTION INTRAMUSCULAR; INTRAVENOUS at 01:10

## 2022-10-21 RX ADMIN — MORPHINE SULFATE 4 MG: 4 INJECTION INTRAVENOUS at 08:10

## 2022-10-21 RX ADMIN — GABAPENTIN 300 MG: 300 CAPSULE ORAL at 02:10

## 2022-10-21 RX ADMIN — METRONIDAZOLE 500 MG: 5 INJECTION, SOLUTION INTRAVENOUS at 01:10

## 2022-10-21 RX ADMIN — CIPROFLOXACIN 400 MG: 2 INJECTION, SOLUTION INTRAVENOUS at 12:10

## 2022-10-21 RX ADMIN — METRONIDAZOLE 500 MG: 5 INJECTION, SOLUTION INTRAVENOUS at 08:10

## 2022-10-21 RX ADMIN — BUPROPION HYDROCHLORIDE 150 MG: 150 TABLET, EXTENDED RELEASE ORAL at 08:10

## 2022-10-21 NOTE — HOSPITAL COURSE
10/21/2022: HD 2. Still with some LLQ pain. Leukocytosis resolved. Passing flatus.      10/22/2022:  Hospital day 3.  Pain improved.  Tolerating liquids.  Low residual diet, oral antibiotics.  Possible discharge if diet is tolerated

## 2022-10-21 NOTE — PROGRESS NOTES
[facility] - [facility] ([facility])  Colorectal Surgery  Progress Note    Subjective:     History of Present Illness:  33yo M who presents emergency room with a 1 day history of left lower quadrant abdominal pain.  Patient states he had acute attack of this pain starting around 2:00 a.m. this morning.  Feels that the pain is sharp and stabbing in nature.  He does report previous history of diverticulitis in the past that is a few years ago.  He denies any fever or chills.  Endorses ongoing nausea and vomiting.  Denies any hematochezia or melena.  Has never had any abdominal surgical history.  Denies any previous colonoscopy.  Denies any family history of colorectal cancer.  Workup in the emergency room concerning for leukocytosis of 16k and a CT scan concerning for complicated sigmoid diverticulitis with an undrainable fluid collection that was 2 cm.      Post-Op Info:  * No surgery found *         Interval History: Still with some LLQ pain. Leukocytosis resolved. Passing flatus.      Medications:  Continuous Infusions:   lactated ringers 100 mL/hr at 10/21/22 0821     Scheduled Meds:   buPROPion  150 mg Oral QAM    ciprofloxacin (CIPRO)400mg/200ml D5W IVPB  400 mg Intravenous Q12H    EScitalopram oxalate  10 mg Oral QHS    gabapentin  300 mg Oral TID    metronidazole  500 mg Intravenous Q8H    prazosin  2 mg Oral QHS     PRN Meds:acetaminophen, LIDOcaine HCL 10 mg/ml (1%), melatonin, morphine, morphine, ondansetron, promethazine (PHENERGAN) IVPB, sodium chloride 0.9%     Review of patient's allergies indicates:  No Known Allergies  Objective:     Vital Signs (Most Recent):  Temp: 98.6 °F (37 °C) (10/21/22 1117)  Pulse: 83 (10/21/22 1117)  Resp: 18 (10/21/22 1117)  BP: 137/89 (10/21/22 1117)  SpO2: 99 % (10/21/22 1117) Vital Signs (24h Range):  Temp:  [97.9 °F (36.6 °C)-98.9 °F (37.2 °C)] 98.6 °F (37 °C)  Pulse:  [74-88] 83  Resp:  [16-20] 18  SpO2:  [95 %-100 %] 99 %  BP: (112-143)/(69-89) 137/89     Weight: 103.6  kg (228 lb 6.3 oz)  Body mass index is 28.55 kg/m².    Intake/Output - Last 3 Shifts         10/19 0700  10/20 0659 10/20 0700  10/21 0659 10/21 0700  10/22 0659    I.V. (mL/kg)  77 (0.7)     IV Piggyback  1298.9     Total Intake(mL/kg)  1375.9 (13.3)     Urine (mL/kg/hr)  1250 550 (1.1)    Stool  0 0    Total Output  1250 550    Net  +125.9 -550           Urine Occurrence  1 x     Stool Occurrence  2 x 0 x    Emesis Occurrence  2 x             Physical Exam  Constitutional:       Appearance: He is well-developed.   HENT:      Head: Normocephalic and atraumatic.   Eyes:      Conjunctiva/sclera: Conjunctivae normal.   Neck:      Thyroid: No thyromegaly.   Cardiovascular:      Rate and Rhythm: Normal rate and regular rhythm.   Pulmonary:      Effort: Pulmonary effort is normal. No respiratory distress.   Abdominal:      Comments: Soft, nondistended, +TTP LLQ and suprapubic; no rebound or guarding; no previous incisions   Musculoskeletal:         General: No tenderness. Normal range of motion.      Cervical back: Normal range of motion.   Skin:     General: Skin is warm and dry.      Capillary Refill: Capillary refill takes less than 2 seconds.      Findings: No rash.   Neurological:      Mental Status: He is alert and oriented to person, place, and time.       Significant Labs:  I have reviewed all pertinent lab results within the past 24 hours.  CBC:   Recent Labs   Lab 10/21/22  0503   WBC 9.70   RBC 4.84   HGB 14.0   HCT 42.5      MCV 88   MCH 28.9   MCHC 32.9     BMP:   Recent Labs   Lab 10/21/22  0503   GLU 94      K 4.4      CO2 25   BUN 11   CREATININE 1.0   CALCIUM 9.6   MG 1.8       Significant Diagnostics:  I have reviewed all pertinent imaging results/findings within the past 24 hours.    Assessment/Plan:     * Acute diverticulitis  31yo M with acute perforated sigmoid diverticulitis with small fluid collection    - Trial of clear liquids  - cont IV abx  - PO pain meds  - IV fluids  - no  acute surgical intervention required at this time.  Patient does not have any peritoneal signs and can be treated with IV antibiotics.  If he worsens clinically, we discussed that he could require surgery during this hospitalization although we will try to avoid this.  He is amenable to this plan.  - OOB, ambulation    Depression  Stable, continue appropriate home meds        Prasad Alonso MD  Colorectal Surgery  [facility] - Telemetry ([facility])

## 2022-10-21 NOTE — NURSING
Pt admitted with diverticulitis, complain of nausea and vomiting, vomited x 2 during shift. Administered prn ondansetron injection 4 mg at 2018, still no relief from nausea, provider notified.

## 2022-10-21 NOTE — ASSESSMENT & PLAN NOTE
33yo M with acute perforated sigmoid diverticulitis with small fluid collection    - Trial of clear liquids  - cont IV abx  - PO pain meds  - IV fluids  - no acute surgical intervention required at this time.  Patient does not have any peritoneal signs and can be treated with IV antibiotics.  If he worsens clinically, we discussed that he could require surgery during this hospitalization although we will try to avoid this.  He is amenable to this plan.  - OOB, ambulation

## 2022-10-21 NOTE — SUBJECTIVE & OBJECTIVE
Interval History: Still with some LLQ pain. Leukocytosis resolved. Passing flatus.      Medications:  Continuous Infusions:   lactated ringers 100 mL/hr at 10/21/22 0821     Scheduled Meds:   buPROPion  150 mg Oral QAM    ciprofloxacin (CIPRO)400mg/200ml D5W IVPB  400 mg Intravenous Q12H    EScitalopram oxalate  10 mg Oral QHS    gabapentin  300 mg Oral TID    metronidazole  500 mg Intravenous Q8H    prazosin  2 mg Oral QHS     PRN Meds:acetaminophen, LIDOcaine HCL 10 mg/ml (1%), melatonin, morphine, morphine, ondansetron, promethazine (PHENERGAN) IVPB, sodium chloride 0.9%     Review of patient's allergies indicates:  No Known Allergies  Objective:     Vital Signs (Most Recent):  Temp: 98.6 °F (37 °C) (10/21/22 1117)  Pulse: 83 (10/21/22 1117)  Resp: 18 (10/21/22 1117)  BP: 137/89 (10/21/22 1117)  SpO2: 99 % (10/21/22 1117) Vital Signs (24h Range):  Temp:  [97.9 °F (36.6 °C)-98.9 °F (37.2 °C)] 98.6 °F (37 °C)  Pulse:  [74-88] 83  Resp:  [16-20] 18  SpO2:  [95 %-100 %] 99 %  BP: (112-143)/(69-89) 137/89     Weight: 103.6 kg (228 lb 6.3 oz)  Body mass index is 28.55 kg/m².    Intake/Output - Last 3 Shifts         10/19 0700  10/20 0659 10/20 0700  10/21 0659 10/21 0700  10/22 0659    I.V. (mL/kg)  77 (0.7)     IV Piggyback  1298.9     Total Intake(mL/kg)  1375.9 (13.3)     Urine (mL/kg/hr)  1250 550 (1.1)    Stool  0 0    Total Output  1250 550    Net  +125.9 -550           Urine Occurrence  1 x     Stool Occurrence  2 x 0 x    Emesis Occurrence  2 x             Physical Exam  Constitutional:       Appearance: He is well-developed.   HENT:      Head: Normocephalic and atraumatic.   Eyes:      Conjunctiva/sclera: Conjunctivae normal.   Neck:      Thyroid: No thyromegaly.   Cardiovascular:      Rate and Rhythm: Normal rate and regular rhythm.   Pulmonary:      Effort: Pulmonary effort is normal. No respiratory distress.   Abdominal:      Comments: Soft, nondistended, +TTP LLQ and suprapubic; no rebound or guarding; no  previous incisions   Musculoskeletal:         General: No tenderness. Normal range of motion.      Cervical back: Normal range of motion.   Skin:     General: Skin is warm and dry.      Capillary Refill: Capillary refill takes less than 2 seconds.      Findings: No rash.   Neurological:      Mental Status: He is alert and oriented to person, place, and time.       Significant Labs:  I have reviewed all pertinent lab results within the past 24 hours.  CBC:   Recent Labs   Lab 10/21/22  0503   WBC 9.70   RBC 4.84   HGB 14.0   HCT 42.5      MCV 88   MCH 28.9   MCHC 32.9     BMP:   Recent Labs   Lab 10/21/22  0503   GLU 94      K 4.4      CO2 25   BUN 11   CREATININE 1.0   CALCIUM 9.6   MG 1.8       Significant Diagnostics:  I have reviewed all pertinent imaging results/findings within the past 24 hours.

## 2022-10-21 NOTE — PLAN OF CARE
[facility] - Telemetry (Hospital)  Initial Discharge Assessment       Primary Care Provider: Primary Doctor No    Admission Diagnosis: Acute diverticulitis [K57.92]    Admission Date: 10/20/2022  Expected Discharge Date:     Discharge Barriers Identified: Social, Other (see comments) (PATIENT RECENTLY MOVED HERE. LIVING WITH SISTER. ONLY SUPPORT SYSTEM IS SISTER. NO JOB.)    Payor: MEDICAID / Plan: PENDING MEDICAID / Product Type: Government /     Extended Emergency Contact Information  Primary Emergency Contact: Patricia Mclaughlin  Mobile Phone: [phone]  Relation: Brother   needed? No  Secondary Emergency Contact: Ingrid Mclaughlin  Mobile Phone: [phone]  Relation: Sister   needed? No    Discharge Plan A: Home with family       No Pharmacies Listed    Initial Assessment (most recent)       Adult Discharge Assessment - 10/21/22 1123          Discharge Assessment    Assessment Type Discharge Planning Assessment     Confirmed/corrected address, phone number and insurance Yes     Confirmed Demographics Correct on Facesheet     Source of Information patient     When was your last doctors appointment? --   1 month ago    Communicated NORBERTO with patient/caregiver Yes     Reason For Admission diverticulitis/ ABD PAIN     Lives With sibling(s)     Do you expect to return to your current living situation? Yes     Do you have help at home or someone to help you manage your care at home? Yes     Who are your caregiver(s) and their phone number(s)? PATRICIA FLORES [phone]/ INGRID FLORES [phone]     Prior to hospitilization cognitive status: Alert/Oriented     Current cognitive status: Alert/Oriented     Walking or Climbing Stairs Difficulty none     Dressing/Bathing Difficulty none     Home Accessibility stairs to enter home     Home Layout Bedroom on 2nd floor;Bathroom on 2nd floor     Equipment Currently Used at Home none     Readmission within 30 days? No     Patient currently being followed by  outpatient case management? No     Do you currently have service(s) that help you manage your care at home? No     Do you take prescription medications? Yes     Do you have prescription coverage? Yes     Do you have any problems affording any of your prescribed medications? No     Is the patient taking medications as prescribed? yes     Who is going to help you get home at discharge? HIMSELF OR SISTER     How do you get to doctors appointments? car, drives self     Are you on dialysis? No     Do you take coumadin? No     Discharge Plan A Home with family     DME Needed Upon Discharge  none     Discharge Plan discussed with: Patient     Discharge Barriers Identified Social;Other (see comments)   PATIENT RECENTLY MOVED HERE. LIVING WITH SISTER. ONLY SUPPORT SYSTEM IS SISTER. NO JOB.       Physical Activity    On average, how many days per week do you engage in moderate to strenuous exercise (like a brisk walk)? 2 days     On average, how many minutes do you engage in exercise at this level? 60 min        Financial Resource Strain    How hard is it for you to pay for the very basics like food, housing, medical care, and heating? Somewhat hard        Housing Stability    In the last 12 months, was there a time when you were not able to pay the mortgage or rent on time? No     In the last 12 months, how many places have you lived? 2     In the last 12 months, was there a time when you did not have a steady place to sleep or slept in a shelter (including now)? No        Transportation Needs    In the past 12 months, has lack of transportation kept you from medical appointments or from getting medications? No     In the past 12 months, has lack of transportation kept you from meetings, work, or from getting things needed for daily living? No        Food Insecurity    Within the past 12 months, you worried that your food would run out before you got the money to buy more. Sometimes true     Within the past 12 months, the  food you bought just didn't last and you didn't have money to get more. Sometimes true        Stress    Do you feel stress - tense, restless, nervous, or anxious, or unable to sleep at night because your mind is troubled all the time - these days? Very much        Social Connections    In a typical week, how many times do you talk on the phone with family, friends, or neighbors? Twice a week     How often do you get together with friends or relatives? Once a week     How often do you attend Adventist or Catholic services? Never     Do you belong to any clubs or organizations such as Adventist groups, unions, fra[employer]ine or athletic groups, or school groups? No     How often do you attend meetings of the clubs or organizations you belong to? Never     Are you , , , , never , or living with a partner? Never         Alcohol Use    Q1: How often do you have a drink containing alcohol? Never     Q2: How many drinks containing alcohol do you have on a typical day when you are drinking? Patient does not drink     Q3: How often do you have six or more drinks on one occasion? Never        Relationship/Environment    Name(s) of Who Lives With Patient SHERLEY FLORES [phone]                           provided a transitional care folder, information on advanced directives, information on pharmacy bedside delivery, and discharge planning begins on admission with contact information for any needs/questions.

## 2022-10-22 VITALS
BODY MASS INDEX: 28.4 KG/M2 | HEIGHT: 75 IN | HEART RATE: 81 BPM | DIASTOLIC BLOOD PRESSURE: 92 MMHG | OXYGEN SATURATION: 94 % | SYSTOLIC BLOOD PRESSURE: 131 MMHG | WEIGHT: 228.38 LBS | RESPIRATION RATE: 18 BRPM | TEMPERATURE: 98 F

## 2022-10-22 LAB
ANION GAP SERPL CALC-SCNC: 12 MMOL/L (ref 8–16)
BASOPHILS # BLD AUTO: 0.03 K/UL (ref 0–0.2)
BASOPHILS NFR BLD: 0.4 % (ref 0–1.9)
BUN SERPL-MCNC: 9 MG/DL (ref 6–20)
CALCIUM SERPL-MCNC: 9.8 MG/DL (ref 8.7–10.5)
CHLORIDE SERPL-SCNC: 104 MMOL/L (ref 95–110)
CO2 SERPL-SCNC: 24 MMOL/L (ref 23–29)
CREAT SERPL-MCNC: 1 MG/DL (ref 0.5–1.4)
DIFFERENTIAL METHOD: NORMAL
EOSINOPHIL # BLD AUTO: 0.1 K/UL (ref 0–0.5)
EOSINOPHIL NFR BLD: 1.6 % (ref 0–8)
ERYTHROCYTE [DISTWIDTH] IN BLOOD BY AUTOMATED COUNT: 11.8 % (ref 11.5–14.5)
EST. GFR  (NO RACE VARIABLE): >60 ML/MIN/1.73 M^2
GLUCOSE SERPL-MCNC: 99 MG/DL (ref 70–110)
HCT VFR BLD AUTO: 41.1 % (ref 40–54)
HGB BLD-MCNC: 14 G/DL (ref 14–18)
IMM GRANULOCYTES # BLD AUTO: 0.03 K/UL (ref 0–0.04)
IMM GRANULOCYTES NFR BLD AUTO: 0.4 % (ref 0–0.5)
LYMPHOCYTES # BLD AUTO: 2.5 K/UL (ref 1–4.8)
LYMPHOCYTES NFR BLD: 30.9 % (ref 18–48)
MAGNESIUM SERPL-MCNC: 1.8 MG/DL (ref 1.6–2.6)
MCH RBC QN AUTO: 29.3 PG (ref 27–31)
MCHC RBC AUTO-ENTMCNC: 34.1 G/DL (ref 32–36)
MCV RBC AUTO: 86 FL (ref 82–98)
MONOCYTES # BLD AUTO: 0.5 K/UL (ref 0.3–1)
MONOCYTES NFR BLD: 6.6 % (ref 4–15)
NEUTROPHILS # BLD AUTO: 4.8 K/UL (ref 1.8–7.7)
NEUTROPHILS NFR BLD: 60.1 % (ref 38–73)
NRBC BLD-RTO: 0 /100 WBC
PHOSPHATE SERPL-MCNC: 3.4 MG/DL (ref 2.7–4.5)
PLATELET # BLD AUTO: 317 K/UL (ref 150–450)
PMV BLD AUTO: 9.3 FL (ref 9.2–12.9)
POTASSIUM SERPL-SCNC: 4.3 MMOL/L (ref 3.5–5.1)
RBC # BLD AUTO: 4.78 M/UL (ref 4.6–6.2)
SODIUM SERPL-SCNC: 140 MMOL/L (ref 136–145)
WBC # BLD AUTO: 7.99 K/UL (ref 3.9–12.7)

## 2022-10-22 PROCEDURE — 85025 COMPLETE CBC W/AUTO DIFF WBC: CPT | Performed by: COLON & RECTAL SURGERY

## 2022-10-22 PROCEDURE — 63600175 PHARM REV CODE 636 W HCPCS: Performed by: COLON & RECTAL SURGERY

## 2022-10-22 PROCEDURE — 25000003 PHARM REV CODE 250: Performed by: SURGERY

## 2022-10-22 PROCEDURE — 80048 BASIC METABOLIC PNL TOTAL CA: CPT | Performed by: COLON & RECTAL SURGERY

## 2022-10-22 PROCEDURE — S0030 INJECTION, METRONIDAZOLE: HCPCS | Performed by: COLON & RECTAL SURGERY

## 2022-10-22 PROCEDURE — 84100 ASSAY OF PHOSPHORUS: CPT | Performed by: COLON & RECTAL SURGERY

## 2022-10-22 PROCEDURE — 36415 COLL VENOUS BLD VENIPUNCTURE: CPT | Performed by: COLON & RECTAL SURGERY

## 2022-10-22 PROCEDURE — 25000003 PHARM REV CODE 250: Performed by: COLON & RECTAL SURGERY

## 2022-10-22 PROCEDURE — 83735 ASSAY OF MAGNESIUM: CPT | Performed by: COLON & RECTAL SURGERY

## 2022-10-22 PROCEDURE — 25000003 PHARM REV CODE 250: Performed by: NURSE PRACTITIONER

## 2022-10-22 PROCEDURE — 99238 PR HOSPITAL DISCHARGE DAY,<30 MIN: ICD-10-PCS | Mod: ,,, | Performed by: SURGERY

## 2022-10-22 PROCEDURE — 99238 HOSP IP/OBS DSCHRG MGMT 30/<: CPT | Mod: ,,, | Performed by: SURGERY

## 2022-10-22 PROCEDURE — 63600175 PHARM REV CODE 636 W HCPCS: Performed by: NURSE PRACTITIONER

## 2022-10-22 RX ORDER — CIPROFLOXACIN 500 MG/1
500 TABLET ORAL 2 TIMES DAILY
Qty: 14 TABLET | Refills: 0 | Status: SHIPPED | OUTPATIENT
Start: 2022-10-22 | End: 2022-10-29

## 2022-10-22 RX ORDER — OXYCODONE HYDROCHLORIDE 5 MG/1
5 TABLET ORAL EVERY 6 HOURS PRN
Qty: 15 TABLET | Refills: 0 | Status: SHIPPED | OUTPATIENT
Start: 2022-10-22 | End: 2022-12-20

## 2022-10-22 RX ORDER — METRONIDAZOLE 500 MG/1
500 TABLET ORAL EVERY 8 HOURS
Qty: 21 TABLET | Refills: 0 | Status: SHIPPED | OUTPATIENT
Start: 2022-10-22 | End: 2022-10-29

## 2022-10-22 RX ORDER — CIPROFLOXACIN 500 MG/1
500 TABLET ORAL EVERY 12 HOURS
Status: DISCONTINUED | OUTPATIENT
Start: 2022-10-22 | End: 2022-10-22 | Stop reason: HOSPADM

## 2022-10-22 RX ADMIN — CIPROFLOXACIN 400 MG: 2 INJECTION, SOLUTION INTRAVENOUS at 12:10

## 2022-10-22 RX ADMIN — CIPROFLOXACIN 500 MG: 500 TABLET, FILM COATED ORAL at 11:10

## 2022-10-22 RX ADMIN — OXYCODONE HYDROCHLORIDE 10 MG: 5 TABLET ORAL at 08:10

## 2022-10-22 RX ADMIN — OXYCODONE HYDROCHLORIDE 5 MG: 5 TABLET ORAL at 12:10

## 2022-10-22 RX ADMIN — SODIUM CHLORIDE, POTASSIUM CHLORIDE, SODIUM LACTATE AND CALCIUM CHLORIDE: 600; 310; 30; 20 INJECTION, SOLUTION INTRAVENOUS at 09:10

## 2022-10-22 RX ADMIN — PROMETHAZINE HYDROCHLORIDE 6.25 MG: 25 INJECTION INTRAMUSCULAR; INTRAVENOUS at 12:10

## 2022-10-22 RX ADMIN — METRONIDAZOLE 500 MG: 5 INJECTION, SOLUTION INTRAVENOUS at 12:10

## 2022-10-22 RX ADMIN — METRONIDAZOLE 500 MG: 5 INJECTION, SOLUTION INTRAVENOUS at 04:10

## 2022-10-22 RX ADMIN — GABAPENTIN 300 MG: 300 CAPSULE ORAL at 08:10

## 2022-10-22 RX ADMIN — BUPROPION HYDROCHLORIDE 150 MG: 150 TABLET, EXTENDED RELEASE ORAL at 08:10

## 2022-10-22 NOTE — ASSESSMENT & PLAN NOTE
33yo M with acute perforated sigmoid diverticulitis with small fluid collection    -low residual  - oral antibiotic  - PO pain meds  - IV fluids  If low residual diet is tolerated can be discharged home  If condition worsens repeat CT scan.        - no acute surgical intervention required at this time.  Patient does not have any peritoneal signs and can be treated with IV antibiotics.  If he worsens clinically, we discussed that he could require surgery during this hospitalization although we will try to avoid this.  He is amenable to this plan.  - OOB, ambulation

## 2022-10-22 NOTE — DISCHARGE SUMMARY
[facility] ([facility])  General Surgery  Discharge Summary      Patient Name: David Sevilla  MRN: [id number]  Admission Date: 10/20/2022  Hospital Length of Stay: 1 days  Discharge Date and Time:  10/22/2022 2:53 PM  Attending Physician: Prasad Alonso MD   Discharging Provider: Nabil Esparza MD  Primary Care Provider: Primary Doctor No    HPI:   33yo M who presents emergency room with a 1 day history of left lower quadrant abdominal pain.  Patient states he had acute attack of this pain starting around 2:00 a.m. this morning.  Feels that the pain is sharp and stabbing in nature.  He does report previous history of diverticulitis in the past that is a few years ago.  He denies any fever or chills.  Endorses ongoing nausea and vomiting.  Denies any hematochezia or melena.  Has never had any abdominal surgical history.  Denies any previous colonoscopy.  Denies any family history of colorectal cancer.  Workup in the emergency room concerning for leukocytosis of 16k and a CT scan concerning for complicated sigmoid diverticulitis with an undrainable fluid collection that was 2 cm.      * No surgery found *      Indwelling Lines/Drains at time of discharge:   Lines/Drains/Airways     None               Hospital Course:   10/21/2022: HD 2. Still with some LLQ pain. Leukocytosis resolved. Passing flatus.      10/22/2022:  Hospital day 3.  Pain improved.  Tolerating liquids.  Low residual diet, oral antibiotics.  Possible discharge if diet is tolerated                Goals of Care Treatment Preferences:  Code Status: Full Code      Consults:     Significant Diagnostic Studies: Labs:   BMP:   Recent Labs   Lab 10/21/22  0503 10/22/22  0504   GLU 94 99    140   K 4.4 4.3    104   CO2 25 24   BUN 11 9   CREATININE 1.0 1.0   CALCIUM 9.6 9.8   MG 1.8 1.8   , CMP   Recent Labs   Lab 10/21/22  0503 10/22/22  0504    140   K 4.4 4.3    104   CO2 25 24   GLU 94 99   BUN 11 9    CREATININE 1.0 1.0   CALCIUM 9.6 9.8   ANIONGAP 9 12    and CBC   Recent Labs   Lab 10/21/22  0503 10/22/22  0504   WBC 9.70 7.99   HGB 14.0 14.0   HCT 42.5 41.1    317     Radiology: CT scan: CT ABDOMEN PELVIS WITH CONTRAST: No results found for this visit on 10/20/22. and CT ABDOMEN PELVIS WITHOUT CONTRAST: No results found for this visit on 10/20/22.    Pending Diagnostic Studies:     None        Final Active Diagnoses:    Diagnosis Date Noted POA    PRINCIPAL PROBLEM:  Acute diverticulitis [K57.92] 10/20/2022 Yes    Depression [F32.A] 10/20/2022 Yes      Problems Resolved During this Admission:      Discharged Condition: stable    Disposition: Home or Self Care    Follow Up:   Follow-up Information     Prasad Alonso MD Follow up in 2 week(s).    Specialties: Colon and Rectal Surgery, General Surgery  Why: Follow-up for diverticulitis  Contact information:  [address] [place] [address] [address]in [address] [address] [phone]             Mirlande Alonso .                     Patient Instructions:      Diet Adult Regular   Order Comments: Low residual diet or a low-fiber diet     Notify your health care provider if you experience any of the following:  temperature >100.4     Notify your health care provider if you experience any of the following:  persistent nausea and vomiting or diarrhea     Notify your health care provider if you experience any of the following:  severe uncontrolled pain     Activity as tolerated     Medications:  Reconciled Home Medications:      Medication List      START taking these medications    ciprofloxacin HCl 500 MG tablet  Commonly known as: CIPRO  Take 1 tablet (500 mg total) by mouth 2 (two) times daily. for 7 days     metroNIDAZOLE 500 MG tablet  Commonly known as: FLAGYL  Take 1 tablet (500 mg total) by mouth every 8 (eight) hours. for 7 days     oxyCODONE 5 MG immediate release tablet  Commonly known as: ROXICODONE  Take 1 tablet (5 mg total) by mouth every 6 (six) hours as  needed for Pain.        CONTINUE taking these medications    buPROPion 150 MG TB24 tablet  Commonly known as: WELLBUTRIN XL  Take 150 mg by mouth every morning.     EScitalopram oxalate 10 MG tablet  Commonly known as: LEXAPRO  Take 10 mg by mouth every evening.     gabapentin 300 MG capsule  Commonly known as: NEURONTIN  Take 300 mg by mouth 3 (three) times daily.     hyoscyamine 0.125 mg Tab  Commonly known as: ANASPAZ,LEVSIN  Take 125 mcg by mouth every 4 (four) hours as needed (pain).     metoclopramide HCl 5 MG tablet  Commonly known as: REGLAN  Take 5 mg by mouth 2 (two) times daily before meals.     prazosin 2 MG Cap  Commonly known as: MINIPRESS  Take 2 mg by mouth every evening.          Time spent on the discharge of patient: 18 minutes    Nabil Esparza MD  General Surgery  [facility] ([facility])

## 2022-10-22 NOTE — PLAN OF CARE
Pt AAOx4. No tele monitor orders. Remains on room air; tolerating well. Pt turned and repositioned independently with use of pillows. Pt voids spontaneously without difficulty into urinal at bedside. 20 G PIV in right AC remains CDI with no redness, swelling, warmth, or drainage with LR gtt at 100 ml/hr. Bed low, wheels locked, alarms audible. Plan of care reviewed. Vital signs monitored. Fall precautions in place. Pain assessed. Safety promoted. Infection risks reduced.

## 2022-10-22 NOTE — PLAN OF CARE
[facility] ([facility])  Discharge Final Note    Primary Care Provider: Primary Doctor No    Expected Discharge Date: 10/22/2022    Final Discharge Note (most recent)       Final Note - 10/22/22 1507          Final Note    Assessment Type Final Discharge Note     Anticipated Discharge Disposition Home or Self Care        Post-Acute Status    Discharge Delays None known at this time                     Important Message from Medicare             Contact Info       Prasad Alonso MD   Specialty: Colon and Rectal Surgery, General Surgery    [address] [place] [address] [address] [id number]   Phone: [phone]       Next Steps: Follow up in 2 week(s)    Instructions: Follow-up for diverticulitis    Mirlande Alonso        Next Steps: Follow up

## 2022-10-22 NOTE — DISCHARGE INSTRUCTIONS
Should you develop increased abdominal pain, bloating, nausea vomiting, fever chills please return to the emergency room or contact Dr. Alonso office    Our office phone numbers are  [phone] and     Below was some information about diverticulitis in the medicine should be taking at home

## 2022-10-22 NOTE — SUBJECTIVE & OBJECTIVE
1Interval History:  Pain controlled, some lower abdominal soreness, tolerating clears, flatus and bowel movements.  Oral antibiotics.  Low residual diet.  Possible discharge    Medications:  Continuous Infusions:  Scheduled Meds:   buPROPion  150 mg Oral QAM    ciprofloxacin HCl  500 mg Oral Q12H    EScitalopram oxalate  10 mg Oral QHS    gabapentin  300 mg Oral TID    metronidazole  500 mg Intravenous Q8H    prazosin  2 mg Oral QHS     PRN Meds:acetaminophen, HYDROmorphone, LIDOcaine HCL 10 mg/ml (1%), melatonin, ondansetron, oxyCODONE, oxyCODONE, promethazine (PHENERGAN) IVPB, sodium chloride 0.9%     Review of patient's allergies indicates:  No Known Allergies  Objective:     Vital Signs (Most Recent):  Temp: 97.5 °F (36.4 °C) (10/22/22 0724)  Pulse: 72 (10/22/22 0724)  Resp: 18 (10/22/22 0803)  BP: 137/78 (10/22/22 0724)  SpO2: 96 % (10/22/22 0724) Vital Signs (24h Range):  Temp:  [97.5 °F (36.4 °C)-99.1 °F (37.3 °C)] 97.5 °F (36.4 °C)  Pulse:  [70-83] 72  Resp:  [18] 18  SpO2:  [96 %-99 %] 96 %  BP: (136-140)/(78-89) 137/78     Weight: 103.6 kg (228 lb 6.3 oz)  Body mass index is 28.55 kg/m².    Intake/Output - Last 3 Shifts         10/20 0700  10/21 0659 10/21 0700  10/22 0659 10/22 0700  10/23 0659    I.V. (mL/kg) 77 (0.7) 1071.3 (10.3)     IV Piggyback 1298.9 344.8     Total Intake(mL/kg) 1375.9 (13.3) 1416.1 (13.7)     Urine (mL/kg/hr) 1250 2450 (1) 400 (0.9)    Stool 0 0     Total Output 1250 2450 400    Net +125.9 -1033.9 -400           Urine Occurrence 1 x 4 x     Stool Occurrence 2 x 1 x     Emesis Occurrence 2 x              Physical Exam  Constitutional:       General: He is not in acute distress.     Appearance: He is well-developed.   HENT:      Head: Normocephalic and atraumatic.   Eyes:      General: No scleral icterus.     Pupils: Pupils are equal, round, and reactive to light.   Neck:      Thyroid: No thyromegaly.      Vascular: No JVD.      Trachea: No tracheal deviation.   Cardiovascular:       Rate and Rhythm: Normal rate and regular rhythm.      Heart sounds: Normal heart sounds.   Pulmonary:      Effort: Pulmonary effort is normal.      Breath sounds: Normal breath sounds.   Abdominal:      General: Bowel sounds are normal. There is no distension.      Palpations: Abdomen is soft. There is no mass.      Tenderness: There is no abdominal tenderness (Mild left lower quad). There is no guarding or rebound.   Musculoskeletal:         General: Normal range of motion.      Cervical back: Normal range of motion and neck supple.   Lymphadenopathy:      Cervical: No cervical adenopathy.   Skin:     General: Skin is warm and dry.   Neurological:      Mental Status: He is alert and oriented to person, place, and time.   Psychiatric:         Behavior: Behavior normal.         Thought Content: Thought content normal.         Judgment: Judgment normal.       Significant Labs:  I have reviewed all pertinent lab results within the past 24 hours.  CBC:   Recent Labs   Lab 10/22/22  0504   WBC 7.99   RBC 4.78   HGB 14.0   HCT 41.1      MCV 86   MCH 29.3   MCHC 34.1     BMP:   Recent Labs   Lab 10/22/22  0504   GLU 99      K 4.3      CO2 24   BUN 9   CREATININE 1.0   CALCIUM 9.8   MG 1.8     CMP:   Recent Labs   Lab 10/20/22  1120 10/21/22  0503 10/22/22  0504   GLU 87   < > 99   CALCIUM 10.2   < > 9.8   ALBUMIN 4.4  --   --    PROT 8.1  --   --       < > 140   K 4.2   < > 4.3   CO2 23   < > 24      < > 104   BUN 14   < > 9   CREATININE 1.0   < > 1.0   ALKPHOS 89  --   --    ALT 54*  --   --    AST 22  --   --    BILITOT 0.7  --   --     < > = values in this interval not displayed.       Significant Diagnostics:  I have reviewed all pertinent imaging results/findings within the past 24 hours.  No new

## 2022-10-22 NOTE — PLAN OF CARE
Okay to discharge pt per MD.   Went over discharge instructions with patient.   Stressed importance of making and keeping all follow ups as well as making prescribed medication changes.   Printed prescriptions delivered to pt bedside.  Patient verbalized understanding and has had all questions in regards to discharge answered to satisfaction.  IV removed without complications.  No telemetry monitor orders.   Pt stated he would drive himself. Pt educated on risks associated due to PO pain medications. Pt acknowledged risks and stated he was fine to drive himself. Okay for pt to drive self per charge nurse. Patient transported via wheelchair by PCT to personal vehicle.

## 2022-10-22 NOTE — PROGRESS NOTES
[facility]  General Surgery  Progress Note    Subjective:     History of Present Illness:  31yo M who presents emergency room with a 1 day history of left lower quadrant abdominal pain.  Patient states he had acute attack of this pain starting around 2:00 a.m. this morning.  Feels that the pain is sharp and stabbing in nature.  He does report previous history of diverticulitis in the past that is a few years ago.  He denies any fever or chills.  Endorses ongoing nausea and vomiting.  Denies any hematochezia or melena.  Has never had any abdominal surgical history.  Denies any previous colonoscopy.  Denies any family history of colorectal cancer.  Workup in the emergency room concerning for leukocytosis of 16k and a CT scan concerning for complicated sigmoid diverticulitis with an undrainable fluid collection that was 2 cm.      Post-Op Info:  * No surgery found *         1Interval History:  Pain controlled, some lower abdominal soreness, tolerating clears, flatus and bowel movements.  Oral antibiotics.  Low residual diet.  Possible discharge    Medications:  Continuous Infusions:  Scheduled Meds:   buPROPion  150 mg Oral QAM    ciprofloxacin HCl  500 mg Oral Q12H    EScitalopram oxalate  10 mg Oral QHS    gabapentin  300 mg Oral TID    metronidazole  500 mg Intravenous Q8H    prazosin  2 mg Oral QHS     PRN Meds:acetaminophen, HYDROmorphone, LIDOcaine HCL 10 mg/ml (1%), melatonin, ondansetron, oxyCODONE, oxyCODONE, promethazine (PHENERGAN) IVPB, sodium chloride 0.9%     Review of patient's allergies indicates:  No Known Allergies  Objective:     Vital Signs (Most Recent):  Temp: 97.5 °F (36.4 °C) (10/22/22 0724)  Pulse: 72 (10/22/22 0724)  Resp: 18 (10/22/22 0803)  BP: 137/78 (10/22/22 0724)  SpO2: 96 % (10/22/22 0724) Vital Signs (24h Range):  Temp:  [97.5 °F (36.4 °C)-99.1 °F (37.3 °C)] 97.5 °F (36.4 °C)  Pulse:  [70-83] 72  Resp:  [18] 18  SpO2:  [96 %-99 %] 96 %  BP: (136-140)/(78-89)  137/78     Weight: 103.6 kg (228 lb 6.3 oz)  Body mass index is 28.55 kg/m².    Intake/Output - Last 3 Shifts         10/20 0700  10/21 0659 10/21 0700  10/22 0659 10/22 0700  10/23 0659    I.V. (mL/kg) 77 (0.7) 1071.3 (10.3)     IV Piggyback 1298.9 344.8     Total Intake(mL/kg) 1375.9 (13.3) 1416.1 (13.7)     Urine (mL/kg/hr) 1250 2450 (1) 400 (0.9)    Stool 0 0     Total Output 1250 2450 400    Net +125.9 -1033.9 -400           Urine Occurrence 1 x 4 x     Stool Occurrence 2 x 1 x     Emesis Occurrence 2 x              Physical Exam  Constitutional:       General: He is not in acute distress.     Appearance: He is well-developed.   HENT:      Head: Normocephalic and atraumatic.   Eyes:      General: No scleral icterus.     Pupils: Pupils are equal, round, and reactive to light.   Neck:      Thyroid: No thyromegaly.      Vascular: No JVD.      Trachea: No tracheal deviation.   Cardiovascular:      Rate and Rhythm: Normal rate and regular rhythm.      Heart sounds: Normal heart sounds.   Pulmonary:      Effort: Pulmonary effort is normal.      Breath sounds: Normal breath sounds.   Abdominal:      General: Bowel sounds are normal. There is no distension.      Palpations: Abdomen is soft. There is no mass.      Tenderness: There is no abdominal tenderness (Mild left lower quad). There is no guarding or rebound.   Musculoskeletal:         General: Normal range of motion.      Cervical back: Normal range of motion and neck supple.   Lymphadenopathy:      Cervical: No cervical adenopathy.   Skin:     General: Skin is warm and dry.   Neurological:      Mental Status: He is alert and oriented to person, place, and time.   Psychiatric:         Behavior: Behavior normal.         Thought Content: Thought content normal.         Judgment: Judgment normal.       Significant Labs:  I have reviewed all pertinent lab results within the past 24 hours.  CBC:   Recent Labs   Lab 10/22/22  0504   WBC 7.99   RBC 4.78   HGB 14.0   HCT  41.1      MCV 86   MCH 29.3   MCHC 34.1     BMP:   Recent Labs   Lab 10/22/22  0504   GLU 99      K 4.3      CO2 24   BUN 9   CREATININE 1.0   CALCIUM 9.8   MG 1.8     CMP:   Recent Labs   Lab 10/20/22  1120 10/21/22  0503 10/22/22  0504   GLU 87   < > 99   CALCIUM 10.2   < > 9.8   ALBUMIN 4.4  --   --    PROT 8.1  --   --       < > 140   K 4.2   < > 4.3   CO2 23   < > 24      < > 104   BUN 14   < > 9   CREATININE 1.0   < > 1.0   ALKPHOS 89  --   --    ALT 54*  --   --    AST 22  --   --    BILITOT 0.7  --   --     < > = values in this interval not displayed.       Significant Diagnostics:  I have reviewed all pertinent imaging results/findings within the past 24 hours.  No new    Assessment/Plan:     * Acute diverticulitis  33yo M with acute perforated sigmoid diverticulitis with small fluid collection    -low residual  - oral antibiotic  - PO pain meds  - IV fluids  If low residual diet is tolerated can be discharged home  If condition worsens repeat CT scan.        - no acute surgical intervention required at this time.  Patient does not have any peritoneal signs and can be treated with IV antibiotics.  If he worsens clinically, we discussed that he could require surgery during this hospitalization although we will try to avoid this.  He is amenable to this plan.  - OOB, ambulation    Depression  Stable, continue appropriate home meds        Nabil Esparza MD  General Surgery  [facility] ([facility])

## 2022-10-24 ENCOUNTER — PATIENT OUTREACH (OUTPATIENT)
Dept: ADMINISTRATIVE | Facility: CLINIC | Age: 32
End: 2022-10-24
Payer: MEDICAID

## 2022-10-24 NOTE — PROGRESS NOTES
C3 nurse spoke with David Sevilla  for a TCC post hospital discharge follow up call. The patient does not have a scheduled HOSFU appointment with Primary Doctor No  within 5-7 days post hospital discharge date 10/22/2022. C3 nurse was unable to schedule HOSFU appointment in [facility].  Patient instructed to please contact pcp and schedule follow up appointment using HOSFU visit type on or before 10/29/2022.   Non [facility] PCP/decline NP

## 2022-11-07 ENCOUNTER — TELEPHONE (OUTPATIENT)
Dept: SURGERY | Facility: CLINIC | Age: 32
End: 2022-11-07
Payer: MEDICAID

## 2022-11-07 NOTE — TELEPHONE ENCOUNTER
Spoke with patient about follow up appointment with Dr Alonso- patient verbalized understanding.     ----- Message from Iman June sent at 11/7/2022  8:37 AM CST -----  Type:  Sooner Apoointment Request    Caller is requesting a sooner appointment.  Caller declined first available appointment listed below.  Caller will not accept being placed on the waitlist and is requesting a message be sent to doctor.  Name of Caller:pt  When is the first available appointment?n/a  Symptoms:diverticulitis/follow up /its bothering pt  Would the patient rather a call back or a response via [facility]? call  Best Call Back Number:[phone]  Additional Information: patient has a follow up package that states in 2 weeks follow up

## 2022-11-16 ENCOUNTER — TELEPHONE (OUTPATIENT)
Dept: SURGERY | Facility: CLINIC | Age: 32
End: 2022-11-16
Payer: MEDICAID

## 2022-11-16 NOTE — TELEPHONE ENCOUNTER
Attempted to contact patient- no answer, left voicemail with callback number [phone].     ----- Message from Angie Morales sent at 11/15/2022  4:04 PM CST -----  Contact: Davdi Hernandez is calling to speak to the nurse with questions and concerns regarding appointment scheduled on 12/05. He states he is not feeling any better and possibly need to be seen sooner. Please give patient a call back at [phone]      Thanks  LJ

## 2022-11-28 ENCOUNTER — HOSPITAL ENCOUNTER (EMERGENCY)
Facility: HOSPITAL | Age: 32
Discharge: HOME | End: 2022-11-28
Attending: EMERGENCY MEDICINE
Payer: MEDICAID

## 2022-11-28 ENCOUNTER — OFFICE VISIT (OUTPATIENT)
Dept: SURGERY | Facility: CLINIC | Age: 32
End: 2022-11-28
Payer: MEDICAID

## 2022-11-28 ENCOUNTER — HOSPITAL ENCOUNTER (OUTPATIENT)
Dept: RADIOLOGY | Facility: HOSPITAL | Age: 32
Discharge: HOME | End: 2022-11-28
Attending: COLON & RECTAL SURGERY
Payer: MEDICAID

## 2022-11-28 VITALS
HEART RATE: 99 BPM | WEIGHT: 217.38 LBS | BODY MASS INDEX: 27.17 KG/M2 | DIASTOLIC BLOOD PRESSURE: 95 MMHG | SYSTOLIC BLOOD PRESSURE: 164 MMHG | TEMPERATURE: 97 F

## 2022-11-28 VITALS
DIASTOLIC BLOOD PRESSURE: 77 MMHG | HEIGHT: 75 IN | RESPIRATION RATE: 19 BRPM | BODY MASS INDEX: 27.17 KG/M2 | OXYGEN SATURATION: 100 % | SYSTOLIC BLOOD PRESSURE: 125 MMHG | TEMPERATURE: 99 F | HEART RATE: 80 BPM | WEIGHT: 218.5 LBS

## 2022-11-28 DIAGNOSIS — K57.92 ACUTE DIVERTICULITIS: ICD-10-CM

## 2022-11-28 DIAGNOSIS — R11.2 NAUSEA AND VOMITING, UNSPECIFIED VOMITING TYPE: ICD-10-CM

## 2022-11-28 DIAGNOSIS — R10.30 LOWER ABDOMINAL PAIN: Primary | ICD-10-CM

## 2022-11-28 DIAGNOSIS — K57.32 SIGMOID DIVERTICULITIS: Primary | ICD-10-CM

## 2022-11-28 DIAGNOSIS — R10.9 ABDOMINAL PAIN: ICD-10-CM

## 2022-11-28 DIAGNOSIS — F12.90 MARIJUANA USE: ICD-10-CM

## 2022-11-28 LAB
ALBUMIN SERPL BCP-MCNC: 4.4 G/DL (ref 3.5–5.2)
ALP SERPL-CCNC: 80 U/L (ref 55–135)
ALT SERPL W/O P-5'-P-CCNC: 55 U/L (ref 10–44)
AMPHET+METHAMPHET UR QL: NEGATIVE
ANION GAP SERPL CALC-SCNC: 15 MMOL/L (ref 8–16)
AST SERPL-CCNC: 21 U/L (ref 10–40)
BARBITURATES UR QL SCN>200 NG/ML: NEGATIVE
BASOPHILS # BLD AUTO: 0.06 K/UL (ref 0–0.2)
BASOPHILS NFR BLD: 0.4 % (ref 0–1.9)
BENZODIAZ UR QL SCN>200 NG/ML: NEGATIVE
BILIRUB SERPL-MCNC: 0.5 MG/DL (ref 0.1–1)
BILIRUB UR QL STRIP: NEGATIVE
BUN SERPL-MCNC: 12 MG/DL (ref 6–20)
BZE UR QL SCN: NEGATIVE
CALCIUM SERPL-MCNC: 10.2 MG/DL (ref 8.7–10.5)
CANNABINOIDS UR QL SCN: ABNORMAL
CHLORIDE SERPL-SCNC: 105 MMOL/L (ref 95–110)
CLARITY UR: CLEAR
CO2 SERPL-SCNC: 20 MMOL/L (ref 23–29)
COLOR UR: COLORLESS
CREAT SERPL-MCNC: 1.2 MG/DL (ref 0.5–1.4)
CREAT UR-MCNC: 30.5 MG/DL (ref 23–375)
DIFFERENTIAL METHOD: ABNORMAL
EOSINOPHIL # BLD AUTO: 0.2 K/UL (ref 0–0.5)
EOSINOPHIL NFR BLD: 1.1 % (ref 0–8)
ERYTHROCYTE [DISTWIDTH] IN BLOOD BY AUTOMATED COUNT: 16 % (ref 11.5–14.5)
EST. GFR  (NO RACE VARIABLE): >60 ML/MIN/1.73 M^2
GLUCOSE SERPL-MCNC: 87 MG/DL (ref 70–110)
GLUCOSE UR QL STRIP: NEGATIVE
HCT VFR BLD AUTO: 48.6 % (ref 40–54)
HCV AB SERPL QL IA: NEGATIVE
HEP C VIRUS HOLD SPECIMEN: NORMAL
HGB BLD-MCNC: 16.5 G/DL (ref 14–18)
HGB UR QL STRIP: NEGATIVE
HIV 1+2 AB+HIV1 P24 AG SERPL QL IA: NEGATIVE
IMM GRANULOCYTES # BLD AUTO: 0.05 K/UL (ref 0–0.04)
IMM GRANULOCYTES NFR BLD AUTO: 0.3 % (ref 0–0.5)
KETONES UR QL STRIP: NEGATIVE
LEUKOCYTE ESTERASE UR QL STRIP: NEGATIVE
LIPASE SERPL-CCNC: 12 U/L (ref 4–60)
LYMPHOCYTES # BLD AUTO: 3.8 K/UL (ref 1–4.8)
LYMPHOCYTES NFR BLD: 26.3 % (ref 18–48)
MCH RBC QN AUTO: 29.4 PG (ref 27–31)
MCHC RBC AUTO-ENTMCNC: 34 G/DL (ref 32–36)
MCV RBC AUTO: 87 FL (ref 82–98)
METHADONE UR QL SCN>300 NG/ML: NEGATIVE
MONOCYTES # BLD AUTO: 0.7 K/UL (ref 0.3–1)
MONOCYTES NFR BLD: 4.5 % (ref 4–15)
NEUTROPHILS # BLD AUTO: 9.8 K/UL (ref 1.8–7.7)
NEUTROPHILS NFR BLD: 67.4 % (ref 38–73)
NITRITE UR QL STRIP: NEGATIVE
NRBC BLD-RTO: 0 /100 WBC
OPIATES UR QL SCN: NEGATIVE
PCP UR QL SCN>25 NG/ML: NEGATIVE
PH UR STRIP: 7 [PH] (ref 5–8)
PLATELET # BLD AUTO: 399 K/UL (ref 150–450)
PMV BLD AUTO: 10.7 FL (ref 9.2–12.9)
POTASSIUM SERPL-SCNC: 3.6 MMOL/L (ref 3.5–5.1)
PROT SERPL-MCNC: 7.7 G/DL (ref 6–8.4)
PROT UR QL STRIP: NEGATIVE
RBC # BLD AUTO: 5.62 M/UL (ref 4.6–6.2)
SODIUM SERPL-SCNC: 140 MMOL/L (ref 136–145)
SP GR UR STRIP: 1.02 (ref 1–1.03)
TOXICOLOGY INFORMATION: ABNORMAL
URN SPEC COLLECT METH UR: ABNORMAL
UROBILINOGEN UR STRIP-ACNC: NEGATIVE EU/DL
WBC # BLD AUTO: 14.59 K/UL (ref 3.9–12.7)

## 2022-11-28 PROCEDURE — 1159F PR MEDICATION LIST DOCUMENTED IN MEDICAL RECORD: ICD-10-PCS | Mod: CPTII,,, | Performed by: COLON & RECTAL SURGERY

## 2022-11-28 PROCEDURE — 3080F DIAST BP >= 90 MM HG: CPT | Mod: CPTII,,, | Performed by: COLON & RECTAL SURGERY

## 2022-11-28 PROCEDURE — 93005 ELECTROCARDIOGRAM TRACING: CPT

## 2022-11-28 PROCEDURE — 85025 COMPLETE CBC W/AUTO DIFF WBC: CPT | Performed by: NURSE PRACTITIONER

## 2022-11-28 PROCEDURE — 3080F PR MOST RECENT DIASTOLIC BLOOD PRESSURE >= 90 MM HG: ICD-10-PCS | Mod: CPTII,,, | Performed by: COLON & RECTAL SURGERY

## 2022-11-28 PROCEDURE — 3008F PR BODY MASS INDEX (BMI) DOCUMENTED: ICD-10-PCS | Mod: CPTII,,, | Performed by: COLON & RECTAL SURGERY

## 2022-11-28 PROCEDURE — 25500020 PHARM REV CODE 255: Performed by: COLON & RECTAL SURGERY

## 2022-11-28 PROCEDURE — 83690 ASSAY OF LIPASE: CPT | Performed by: NURSE PRACTITIONER

## 2022-11-28 PROCEDURE — 96374 THER/PROPH/DIAG INJ IV PUSH: CPT

## 2022-11-28 PROCEDURE — 99999 PR PBB SHADOW E&M-EST. PATIENT-LVL III: ICD-10-PCS | Mod: PBBFAC,,, | Performed by: COLON & RECTAL SURGERY

## 2022-11-28 PROCEDURE — 25000003 PHARM REV CODE 250: Performed by: NURSE PRACTITIONER

## 2022-11-28 PROCEDURE — 99284 EMERGENCY DEPT VISIT MOD MDM: CPT | Mod: 25,27

## 2022-11-28 PROCEDURE — 93010 EKG 12-LEAD: ICD-10-PCS | Mod: ,,, | Performed by: STUDENT IN AN ORGANIZED HEALTH CARE EDUCATION/TRAINING PROGRAM

## 2022-11-28 PROCEDURE — 63600175 PHARM REV CODE 636 W HCPCS: Performed by: EMERGENCY MEDICINE

## 2022-11-28 PROCEDURE — 99999 PR PBB SHADOW E&M-EST. PATIENT-LVL III: CPT | Mod: PBBFAC,,, | Performed by: COLON & RECTAL SURGERY

## 2022-11-28 PROCEDURE — 80307 DRUG TEST PRSMV CHEM ANLYZR: CPT | Performed by: NURSE PRACTITIONER

## 2022-11-28 PROCEDURE — 3008F BODY MASS INDEX DOCD: CPT | Mod: CPTII,,, | Performed by: COLON & RECTAL SURGERY

## 2022-11-28 PROCEDURE — 1159F MED LIST DOCD IN RCRD: CPT | Mod: CPTII,,, | Performed by: COLON & RECTAL SURGERY

## 2022-11-28 PROCEDURE — 86803 HEPATITIS C AB TEST: CPT | Performed by: EMERGENCY MEDICINE

## 2022-11-28 PROCEDURE — 80053 COMPREHEN METABOLIC PANEL: CPT | Performed by: EMERGENCY MEDICINE

## 2022-11-28 PROCEDURE — 3077F PR MOST RECENT SYSTOLIC BLOOD PRESSURE >= 140 MM HG: ICD-10-PCS | Mod: CPTII,,, | Performed by: COLON & RECTAL SURGERY

## 2022-11-28 PROCEDURE — 74177 CT ABD & PELVIS W/CONTRAST: CPT | Mod: TC

## 2022-11-28 PROCEDURE — 3077F SYST BP >= 140 MM HG: CPT | Mod: CPTII,,, | Performed by: COLON & RECTAL SURGERY

## 2022-11-28 PROCEDURE — 99214 PR OFFICE/OUTPT VISIT, EST, LEVL IV, 30-39 MIN: ICD-10-PCS | Mod: S$PBB,,, | Performed by: COLON & RECTAL SURGERY

## 2022-11-28 PROCEDURE — 99213 OFFICE O/P EST LOW 20 MIN: CPT | Mod: PBBFAC,25 | Performed by: COLON & RECTAL SURGERY

## 2022-11-28 PROCEDURE — 99214 OFFICE O/P EST MOD 30 MIN: CPT | Mod: S$PBB,,, | Performed by: COLON & RECTAL SURGERY

## 2022-11-28 PROCEDURE — 87389 HIV-1 AG W/HIV-1&-2 AB AG IA: CPT | Performed by: EMERGENCY MEDICINE

## 2022-11-28 PROCEDURE — 93010 ELECTROCARDIOGRAM REPORT: CPT | Mod: ,,, | Performed by: STUDENT IN AN ORGANIZED HEALTH CARE EDUCATION/TRAINING PROGRAM

## 2022-11-28 PROCEDURE — 81003 URINALYSIS AUTO W/O SCOPE: CPT | Mod: 59 | Performed by: NURSE PRACTITIONER

## 2022-11-28 RX ORDER — METOCLOPRAMIDE HYDROCHLORIDE 5 MG/ML
10 INJECTION INTRAMUSCULAR; INTRAVENOUS
Status: COMPLETED | OUTPATIENT
Start: 2022-11-28 | End: 2022-11-28

## 2022-11-28 RX ORDER — METOCLOPRAMIDE 10 MG/1
10 TABLET ORAL EVERY 6 HOURS PRN
Qty: 30 TABLET | Refills: 0 | Status: SHIPPED | OUTPATIENT
Start: 2022-11-28

## 2022-11-28 RX ORDER — ONDANSETRON 4 MG/1
4 TABLET, ORALLY DISINTEGRATING ORAL
Status: COMPLETED | OUTPATIENT
Start: 2022-11-28 | End: 2022-11-28

## 2022-11-28 RX ORDER — AMOXICILLIN AND CLAVULANATE POTASSIUM 875; 125 MG/1; MG/1
1 TABLET, FILM COATED ORAL EVERY 12 HOURS
Qty: 14 TABLET | Refills: 0 | Status: SHIPPED | OUTPATIENT
Start: 2022-11-28 | End: 2022-12-05

## 2022-11-28 RX ADMIN — IOHEXOL 100 ML: 350 INJECTION, SOLUTION INTRAVENOUS at 04:11

## 2022-11-28 RX ADMIN — ONDANSETRON 4 MG: 4 TABLET, ORALLY DISINTEGRATING ORAL at 06:11

## 2022-11-28 RX ADMIN — METOCLOPRAMIDE 10 MG: 5 INJECTION, SOLUTION INTRAMUSCULAR; INTRAVENOUS at 10:11

## 2022-11-28 RX ADMIN — IOHEXOL 30 ML: 350 INJECTION, SOLUTION INTRAVENOUS at 03:11

## 2022-11-28 NOTE — FIRST PROVIDER EVALUATION
"Medical screening examination initiated.  I have conducted a focused provider triage encounter, findings are as follows:    Brief history of present illness:  Patient presents to ER for lower abdominal pain.  States he had a CT performed outpatient today and was instructed to go to ER for further eval/treatment.    Vitals:    11/28/22 1659   BP: 133/76   BP Location: Right arm   Patient Position: Sitting   Pulse: 85   Resp: 16   Temp: 99.4 °F (37.4 °C)   TempSrc: Oral   SpO2: 99%   Weight: 99.1 kg (218 lb 7.6 oz)   Height: 6' 3" (1.905 m)       Pertinent physical exam:  No acute distress.    Brief workup plan:  Labs, ED bed for further eval/treatment.    Preliminary workup initiated; this workup will be continued and followed by the physician or advanced practice provider that is assigned to the patient when roomed.  "

## 2022-11-28 NOTE — PROGRESS NOTES
History & Physical    SUBJECTIVE:     History of Present Illness:  Patient is a 32 y.o. male presents with complicated diverticulitis in which he was hospitalized in October 2022. Patient discharged with cipro/flagyl which he completed. States that he started feeling better after taking the antibiotics but then symptoms got better after. A few days after completing his antibiotics, his symptoms got worse again with LLQ abdominal pain, nausea, vomiting, dizziness, malaise, blood in stool. States that he has been eating some but has to go to all liquids to get symptom improvement. States that he has never had a colonoscopy. Was first diagnosed with diverticulitis about 4 years ago, this episode is the worst his symptoms have been. No family history of CRC, polpys, IBD. Denies fever, chills. No previous abdominal surgeries.     Chief Complaint   Patient presents with    Diverticulitis     Hospital follow up// diverticulitis       Review of patient's allergies indicates:  No Known Allergies    Current Outpatient Medications   Medication Sig Dispense Refill    hyoscyamine (ANASPAZ,LEVSIN) 0.125 mg Tab Take 125 mcg by mouth every 4 (four) hours as needed (pain).      metoclopramide HCl (REGLAN) 5 MG tablet Take 5 mg by mouth 2 (two) times daily before meals.      buPROPion (WELLBUTRIN XL) 150 MG TB24 tablet Take 150 mg by mouth every morning.      EScitalopram oxalate (LEXAPRO) 10 MG tablet Take 10 mg by mouth every evening.      gabapentin (NEURONTIN) 300 MG capsule Take 300 mg by mouth 3 (three) times daily.      oxyCODONE (ROXICODONE) 5 MG immediate release tablet Take 1 tablet (5 mg total) by mouth every 6 (six) hours as needed for Pain. 15 tablet 0    prazosin (MINIPRESS) 2 MG Cap Take 2 mg by mouth every evening.       No current facility-administered medications for this visit.       No past medical history on file.  No past surgical history on file.  No family history on file.        Review of Systems:  Review of  Systems   Constitutional:  Positive for activity change. Negative for chills, fever and unexpected weight change.   HENT: Negative.  Negative for congestion.    Eyes: Negative.  Negative for visual disturbance.   Respiratory: Negative.  Negative for shortness of breath.    Cardiovascular: Negative.  Negative for chest pain.   Gastrointestinal:  Positive for abdominal pain, nausea and vomiting. Negative for abdominal distention, constipation and rectal pain.   Endocrine: Negative.    Genitourinary:  Negative for dysuria.   Musculoskeletal: Negative.  Negative for arthralgias.   Skin: Negative.  Negative for rash.   Allergic/Immunologic: Negative.    Neurological:  Positive for dizziness.   Hematological:  Negative for adenopathy.   Psychiatric/Behavioral: Negative.     All other systems reviewed and are negative.    OBJECTIVE:     Vital Signs (Most Recent)  Temp: 96.9 °F (36.1 °C) (11/28/22 1411)  Pulse: 99 (11/28/22 1411)  BP: (!) 164/95 (11/28/22 1411)     98.6 kg (217 lb 6 oz)     Physical Exam:  Physical Exam  Vitals and nursing note reviewed.   Constitutional:       General: He is not in acute distress.     Appearance: He is normal weight. He is ill-appearing.   HENT:      Head: Normocephalic and atraumatic.      Right Ear: External ear normal.      Left Ear: External ear normal.      Nose: Nose normal.   Cardiovascular:      Rate and Rhythm: Normal rate.   Pulmonary:      Effort: Pulmonary effort is normal. No respiratory distress.   Abdominal:      General: Abdomen is flat. There is no distension.      Palpations: Abdomen is soft.      Tenderness: There is abdominal tenderness in the periumbilical area and left lower quadrant.      Hernia: No hernia is present.   Musculoskeletal:         General: Normal range of motion.      Cervical back: Normal range of motion and neck supple.   Skin:     General: Skin is warm and dry.   Neurological:      Mental Status: He is alert.   Psychiatric:         Mood and Affect:  Mood normal.         Behavior: Behavior normal.         Thought Content: Thought content normal.         Judgment: Judgment normal.       Laboratory  CBC: Reviewed  CMP: Reviewed      Diagnostic Results:  CT: Reviewed  CT 10/20/2022 Impression:   Extensive descending and sigmoid diverticulosis with thickening inflammatory changes within the proximal sigmoid colon consistent with acute diverticulitis. No drainable fluid collection. Small amount of fluid seen adjacent to the sigmoid colon measuring 2.2 x 1.6 cm. Recommend surgery consult and interval follow-up imaging with oral and IV contrast    ASSESSMENT/PLAN:     32 year old male presents for hospital follow-up from complicated acute diverticulitis.     -Discussed pathophysiology of diverticulitis  -Discussed complicated vs uncomplicated diverticulitis  -Discussed due to history of complicated diverticulitis, there is a risk of having to do emergency surgery.  -Since patient is having worsened symptoms, will get CT abdomen/pelvis with contrast ASAP.  -Augmentin sent to pharmacy  -Discussed how the abscess that he had on his last CT was not drainable. We will get a repeat CT today. Discussed how it would be ideal to have his pain and inflammation resolve for about 6 weeks before colonoscopy and/or surgery.   -RTC 1 week or sooner if necessary    Prasad Alonso MD  Colon and Rectal Surgery  [facility]

## 2022-11-29 NOTE — ED PROVIDER NOTES
Encounter Date: 11/28/2022       History     Chief Complaint   Patient presents with    Abdominal Pain     Sent by Dr. Alonso for eval      The history is provided by the patient.   Abdominal Pain  The current episode started several weeks ago (Pt was seen by Dr Alonso and CT with contrast done today as outpt.  I reviewed his clinic note.). The onset of the illness was gradual. The abdominal pain is located in the periumbilical region and suprapubic region. The abdominal pain does not radiate. The severity of the abdominal pain is 10/10. The abdominal pain is relieved by nothing. The other symptoms of the illness include nausea, vomiting and hematochezia.   The hematochezia began more than 1 week ago. The hematochezia has occurred 2 to 5 times per day. The hematochezia is a recurrent problem.   Nausea began more than 1 week ago.   The vomiting began more than 2 days ago. Vomiting occurs 2 to 5 times per day. Risk factors: Marijuana use.   Significant associated medical issues include diverticulitis.   Review of patient's allergies indicates:  No Known Allergies  Past Medical History:   Diagnosis Date    Diverticulitis      History reviewed. No pertinent surgical history.  History reviewed. No pertinent family history.     Review of Systems   Constitutional: Negative.    HENT: Negative.     Eyes: Negative.    Respiratory: Negative.     Cardiovascular: Negative.    Gastrointestinal:  Positive for abdominal pain, hematochezia, nausea and vomiting.   Endocrine: Negative.    Genitourinary: Negative.    Musculoskeletal: Negative.    Skin: Negative.    Neurological:  Negative for weakness.   Hematological:  Negative for adenopathy. Does not bruise/bleed easily.   Psychiatric/Behavioral: Negative.       Physical Exam     Initial Vitals [11/28/22 1659]   BP Pulse Resp Temp SpO2   133/76 85 16 99.4 °F (37.4 °C) 99 %      MAP       --         Physical Exam    Constitutional: He appears well-developed and well-nourished. He is  not diaphoretic.   HENT:   Head: Atraumatic.   Mouth/Throat: Oropharynx is clear and moist.   Eyes: Conjunctivae are normal.   Neck: Neck supple.   Normal range of motion.  Cardiovascular:  Normal rate, regular rhythm, normal heart sounds and intact distal pulses.           Pulmonary/Chest: Breath sounds normal.   Abdominal: Abdomen is soft. Bowel sounds are normal. He exhibits no distension and no mass. There is abdominal tenderness.   Low abdomen tender There is no rebound and no guarding.   Musculoskeletal:         General: No tenderness or edema.      Cervical back: Normal range of motion and neck supple.     Neurological: GCS score is 15. GCS eye subscore is 4. GCS verbal subscore is 5. GCS motor subscore is 6.   Skin: Skin is warm and dry.       ED Course   Procedures  Labs Reviewed   CBC W/ AUTO DIFFERENTIAL - Abnormal; Notable for the following components:       Result Value    WBC 14.59 (*)     RDW 16.0 (*)     Gran # (ANC) 9.8 (*)     Immature Grans (Abs) 0.05 (*)     All other components within normal limits    Narrative:     Release to patient->Immediate   URINALYSIS, REFLEX TO URINE CULTURE - Abnormal; Notable for the following components:    Color, UA Colorless (*)     All other components within normal limits    Narrative:     Specimen Source->Urine   DRUG SCREEN PANEL, URINE EMERGENCY - Abnormal; Notable for the following components:    THC Presumptive Positive (*)     All other components within normal limits    Narrative:     Specimen Source->Urine   LIPASE    Narrative:     Release to patient->Immediate   HEPATITIS C ANTIBODY    Narrative:     Release to patient->Immediate   HEP C VIRUS HOLD SPECIMEN    Narrative:     Release to patient->Immediate   HIV 1 / 2 ANTIBODY    Narrative:     Release to patient->Immediate   DRUG SCREEN PANEL, URINE EMERGENCY   COMPREHENSIVE METABOLIC PANEL          Imaging Results    None       Results for orders placed or performed during the hospital encounter of  11/28/22   CBC auto differential   Result Value Ref Range    WBC 14.59 (H) 3.90 - 12.70 K/uL    RBC 5.62 4.60 - 6.20 M/uL    Hemoglobin 16.5 14.0 - 18.0 g/dL    Hematocrit 48.6 40.0 - 54.0 %    MCV 87 82 - 98 fL    MCH 29.4 27.0 - 31.0 pg    MCHC 34.0 32.0 - 36.0 g/dL    RDW 16.0 (H) 11.5 - 14.5 %    Platelets 399 150 - 450 K/uL    MPV 10.7 9.2 - 12.9 fL    Immature Granulocytes 0.3 0.0 - 0.5 %    Gran # (ANC) 9.8 (H) 1.8 - 7.7 K/uL    Immature Grans (Abs) 0.05 (H) 0.00 - 0.04 K/uL    Lymph # 3.8 1.0 - 4.8 K/uL    Mono # 0.7 0.3 - 1.0 K/uL    Eos # 0.2 0.0 - 0.5 K/uL    Baso # 0.06 0.00 - 0.20 K/uL    nRBC 0 0 /100 WBC    Gran % 67.4 38.0 - 73.0 %    Lymph % 26.3 18.0 - 48.0 %    Mono % 4.5 4.0 - 15.0 %    Eosinophil % 1.1 0.0 - 8.0 %    Basophil % 0.4 0.0 - 1.9 %    Differential Method Automated    Lipase   Result Value Ref Range    Lipase 12 4 - 60 U/L   Urinalysis, Reflex to Urine Culture Urine, Clean Catch    Specimen: Urine   Result Value Ref Range    Specimen UA Urine, Clean Catch     Color, UA Colorless (A) Yellow, Straw, Phuong    Appearance, UA Clear Clear    pH, UA 7.0 5.0 - 8.0    Specific Gravity, UA 1.025 1.005 - 1.030    Protein, UA Negative Negative    Glucose, UA Negative Negative    Ketones, UA Negative Negative    Bilirubin (UA) Negative Negative    Occult Blood UA Negative Negative    Nitrite, UA Negative Negative    Urobilinogen, UA Negative <2.0 EU/dL    Leukocytes, UA Negative Negative   Hepatitis C Antibody   Result Value Ref Range    Hepatitis C Ab Negative Negative   HCV Virus Hold Specimen   Result Value Ref Range    HEP C Virus Hold Specimen Hold for HCV sendout    HIV 1/2 Ag/Ab (4th Gen)   Result Value Ref Range    HIV 1/2 Ag/Ab Negative Negative   Drug screen panel, in-house   Result Value Ref Range    Benzodiazepines Negative Negative    Methadone metabolites Negative Negative    Cocaine (Metab.) Negative Negative    Opiate Scrn, Ur Negative Negative    Barbiturate Screen, Ur Negative  Negative    Amphetamine Screen, Ur Negative Negative    THC Presumptive Positive (A) Negative    Phencyclidine Negative Negative    Creatinine, Urine 30.5 23.0 - 375.0 mg/dL    Toxicology Information SEE COMMENT          Medications   metoclopramide HCl injection 10 mg (has no administration in time range)   ondansetron disintegrating tablet 4 mg (4 mg Oral Given 11/28/22 1814)      CT abdomen report from earlier today - Previously noted sigmoid inflammatory change has essentially resolved.  No fluid collection identified.  No obstructive bowel findings     Appendix unremarkable.     Liver an spleen stable size.     Pancreas stable     Kidneys without hydronephrosis or adverse finding.     Urinary bladder unremarkable     Impression:     No acute or adverse finding                 10:27 PM Reassessment:  I Discussed test results, shared treatment plan, specific conditions for return, and the need for f/u.  Answered their questions at this time.  Pt understands and agrees to the plan.  The pt has remained hemodynamically stable through ED course and is stable for discharge.         Clinical Impression:   Final diagnoses:  [R10.9] Abdominal pain  [R10.30] Lower abdominal pain (Primary)  [R11.2] Nausea and vomiting, unspecified vomiting type  [F12.90] Marijuana use        ED Disposition Condition    Discharge Stable          ED Prescriptions       Medication Sig Dispense Start Date End Date Auth. Provider    metoclopramide HCl (REGLAN) 10 MG tablet Take 1 tablet (10 mg total) by mouth every 6 (six) hours as needed (nausea/vomiting). 30 tablet 11/28/2022 -- Keely Rodriguez MD          Follow-up Information       Follow up With Specialties Details Why Contact Info    Prasad Alonso MD Colon and Rectal Surgery, General Surgery In 2 days  [address] [place] [address]  [phone]      [facility] - Emergency Dept. Emergency Medicine  As needed, If symptoms worsen [address]  [place]  [place] [address]  [phone]             Keely Rodriguez MD  11/28/22 2221

## 2022-11-30 ENCOUNTER — PATIENT MESSAGE (OUTPATIENT)
Dept: SURGERY | Facility: CLINIC | Age: 32
End: 2022-11-30
Payer: MEDICAID

## 2022-12-05 ENCOUNTER — OFFICE VISIT (OUTPATIENT)
Dept: SURGERY | Facility: CLINIC | Age: 32
End: 2022-12-05
Payer: MEDICAID

## 2022-12-05 VITALS
BODY MASS INDEX: 26.73 KG/M2 | HEART RATE: 82 BPM | WEIGHT: 213.88 LBS | TEMPERATURE: 98 F | SYSTOLIC BLOOD PRESSURE: 147 MMHG | DIASTOLIC BLOOD PRESSURE: 85 MMHG

## 2022-12-05 DIAGNOSIS — K57.32 SIGMOID DIVERTICULITIS: ICD-10-CM

## 2022-12-05 DIAGNOSIS — K57.92 ACUTE DIVERTICULITIS: Primary | ICD-10-CM

## 2022-12-05 PROCEDURE — 3079F DIAST BP 80-89 MM HG: CPT | Mod: CPTII,,, | Performed by: COLON & RECTAL SURGERY

## 2022-12-05 PROCEDURE — 99213 OFFICE O/P EST LOW 20 MIN: CPT | Mod: PBBFAC | Performed by: COLON & RECTAL SURGERY

## 2022-12-05 PROCEDURE — 99214 PR OFFICE/OUTPT VISIT, EST, LEVL IV, 30-39 MIN: ICD-10-PCS | Mod: S$PBB,,, | Performed by: COLON & RECTAL SURGERY

## 2022-12-05 PROCEDURE — 3008F PR BODY MASS INDEX (BMI) DOCUMENTED: ICD-10-PCS | Mod: CPTII,,, | Performed by: COLON & RECTAL SURGERY

## 2022-12-05 PROCEDURE — 3079F PR MOST RECENT DIASTOLIC BLOOD PRESSURE 80-89 MM HG: ICD-10-PCS | Mod: CPTII,,, | Performed by: COLON & RECTAL SURGERY

## 2022-12-05 PROCEDURE — 99999 PR PBB SHADOW E&M-EST. PATIENT-LVL III: ICD-10-PCS | Mod: PBBFAC,,, | Performed by: COLON & RECTAL SURGERY

## 2022-12-05 PROCEDURE — 3008F BODY MASS INDEX DOCD: CPT | Mod: CPTII,,, | Performed by: COLON & RECTAL SURGERY

## 2022-12-05 PROCEDURE — 99999 PR PBB SHADOW E&M-EST. PATIENT-LVL III: CPT | Mod: PBBFAC,,, | Performed by: COLON & RECTAL SURGERY

## 2022-12-05 PROCEDURE — 3077F PR MOST RECENT SYSTOLIC BLOOD PRESSURE >= 140 MM HG: ICD-10-PCS | Mod: CPTII,,, | Performed by: COLON & RECTAL SURGERY

## 2022-12-05 PROCEDURE — 1159F PR MEDICATION LIST DOCUMENTED IN MEDICAL RECORD: ICD-10-PCS | Mod: CPTII,,, | Performed by: COLON & RECTAL SURGERY

## 2022-12-05 PROCEDURE — 1159F MED LIST DOCD IN RCRD: CPT | Mod: CPTII,,, | Performed by: COLON & RECTAL SURGERY

## 2022-12-05 PROCEDURE — 3077F SYST BP >= 140 MM HG: CPT | Mod: CPTII,,, | Performed by: COLON & RECTAL SURGERY

## 2022-12-05 PROCEDURE — 99214 OFFICE O/P EST MOD 30 MIN: CPT | Mod: S$PBB,,, | Performed by: COLON & RECTAL SURGERY

## 2022-12-05 NOTE — PROGRESS NOTES
History & Physical    SUBJECTIVE:     History of Present Illness:  Patient is a 32 y.o. male presents with complicated diverticulitis in which he was hospitalized in October 2022. Patient discharged with cipro/flagyl which he completed. States that he started feeling better after taking the antibiotics but then symptoms got better after. A few days after completing his antibiotics, his symptoms got worse again with LLQ abdominal pain, nausea, vomiting, dizziness, malaise, blood in stool. States that he has been eating some but has to go to all liquids to get symptom improvement. States that he has never had a colonoscopy. Was first diagnosed with diverticulitis about 4 years ago, this episode is the worst his symptoms have been. No family history of CRC, polpys, IBD. Denies fever, chills. No previous abdominal surgeries.     Interval history:   He is feeling much better since starting Augmentin. He has 2 doses left. He complains of pressure over his bladder where he is having urinary frequency, no hematuria. Denies fever, chills, nausea, vomiting, diarrhea, constipation, blood in stool. Stopped marijuana use which has improved nausea.    Chief Complaint   Patient presents with    Follow-up     Diverticulitis       Review of patient's allergies indicates:  No Known Allergies    Current Outpatient Medications   Medication Sig Dispense Refill    amoxicillin-clavulanate 875-125mg (AUGMENTIN) 875-125 mg per tablet Take 1 tablet by mouth every 12 (twelve) hours. for 7 days 14 tablet 0    hyoscyamine (ANASPAZ,LEVSIN) 0.125 mg Tab Take 125 mcg by mouth every 4 (four) hours as needed (pain).      metoclopramide HCl (REGLAN) 10 MG tablet Take 1 tablet (10 mg total) by mouth every 6 (six) hours as needed (nausea/vomiting). 30 tablet 0    buPROPion (WELLBUTRIN XL) 150 MG TB24 tablet Take 150 mg by mouth every morning.      EScitalopram oxalate (LEXAPRO) 10 MG tablet Take 10 mg by mouth every evening.      gabapentin (NEURONTIN)  300 MG capsule Take 300 mg by mouth 3 (three) times daily.      oxyCODONE (ROXICODONE) 5 MG immediate release tablet Take 1 tablet (5 mg total) by mouth every 6 (six) hours as needed for Pain. 15 tablet 0    prazosin (MINIPRESS) 2 MG Cap Take 2 mg by mouth every evening.       No current facility-administered medications for this visit.       Past Medical History:   Diagnosis Date    Diverticulitis      No past surgical history on file.  No family history on file.        Review of Systems:  Review of Systems   Constitutional:  Negative for activity change, chills, fever and unexpected weight change.   HENT: Negative.  Negative for congestion.    Eyes: Negative.  Negative for visual disturbance.   Respiratory: Negative.  Negative for shortness of breath.    Cardiovascular: Negative.  Negative for chest pain.   Gastrointestinal:  Negative for abdominal distention, abdominal pain, anal bleeding, blood in stool, constipation, diarrhea, nausea, rectal pain and vomiting.   Endocrine: Negative.    Genitourinary:  Negative for dysuria.   Musculoskeletal: Negative.  Negative for arthralgias.   Skin: Negative.  Negative for rash.   Allergic/Immunologic: Negative.    Neurological:  Negative for dizziness.   Hematological:  Negative for adenopathy.   Psychiatric/Behavioral: Negative.     All other systems reviewed and are negative.    OBJECTIVE:     Vital Signs (Most Recent)  Temp: 97.5 °F (36.4 °C) (12/05/22 1143)  Pulse: 82 (12/05/22 1143)  BP: (!) 147/85 (12/05/22 1143)     97 kg (213 lb 13.5 oz)     Physical Exam:  Physical Exam  Vitals and nursing note reviewed.   Constitutional:       General: He is not in acute distress.     Appearance: He is normal weight. He is not ill-appearing.   HENT:      Head: Normocephalic and atraumatic.      Right Ear: External ear normal.      Left Ear: External ear normal.      Nose: Nose normal.   Cardiovascular:      Rate and Rhythm: Normal rate.   Pulmonary:      Effort: Pulmonary effort  is normal. No respiratory distress.   Abdominal:      General: Abdomen is flat. There is no distension.      Palpations: Abdomen is soft. There is no mass.      Tenderness: There is no abdominal tenderness.      Hernia: No hernia is present.   Musculoskeletal:         General: Normal range of motion.      Cervical back: Normal range of motion and neck supple.   Skin:     General: Skin is warm and dry.   Neurological:      Mental Status: He is alert.   Psychiatric:         Mood and Affect: Mood normal.         Behavior: Behavior normal.         Thought Content: Thought content normal.         Judgment: Judgment normal.       Laboratory  CBC: Reviewed  CMP: Reviewed      Diagnostic Results:  CT: Reviewed  CT 11/28/2022 Impression: no acute or adverse findings. Previously noted sigmoid inflammatory change has essentially resolved.  No fluid collection identified.  No obstructive bowel findings    CT 10/20/2022 Impression:   Extensive descending and sigmoid diverticulosis with thickening inflammatory changes within the proximal sigmoid colon consistent with acute diverticulitis. No drainable fluid collection. Small amount of fluid seen adjacent to the sigmoid colon measuring 2.2 x 1.6 cm. Recommend surgery consult and interval follow-up imaging with oral and IV contrast    ASSESSMENT/PLAN:     32 year old male presents for hospital follow-up from complicated acute diverticulitis.     - Improved after Augmentin and stopping THC  - Discussed complicated vs uncomplicated diverticulitis.   - Will eventually require sgimoidectomy after colonoscopy  - RTC 4 weeks to schedule colonoscopy and surgery or sooner if symptoms arise/worsen again    Prasad Alonso MD  Colon and Rectal Surgery  [facility]

## 2022-12-17 ENCOUNTER — PATIENT MESSAGE (OUTPATIENT)
Dept: SURGERY | Facility: CLINIC | Age: 32
End: 2022-12-17
Payer: MEDICAID

## 2022-12-19 ENCOUNTER — PATIENT MESSAGE (OUTPATIENT)
Dept: SURGERY | Facility: CLINIC | Age: 32
End: 2022-12-19
Payer: MEDICAID

## 2022-12-20 ENCOUNTER — HOSPITAL ENCOUNTER (EMERGENCY)
Facility: HOSPITAL | Age: 32
Discharge: HOME | End: 2022-12-20
Attending: EMERGENCY MEDICINE
Payer: MEDICAID

## 2022-12-20 VITALS
DIASTOLIC BLOOD PRESSURE: 85 MMHG | WEIGHT: 208.31 LBS | SYSTOLIC BLOOD PRESSURE: 149 MMHG | RESPIRATION RATE: 17 BRPM | HEIGHT: 75 IN | OXYGEN SATURATION: 98 % | TEMPERATURE: 98 F | HEART RATE: 97 BPM | BODY MASS INDEX: 25.9 KG/M2

## 2022-12-20 DIAGNOSIS — K57.33 DIVERTICULITIS LARGE INTESTINE W/O PERFORATION OR ABSCESS W/BLEEDING: Primary | ICD-10-CM

## 2022-12-20 LAB
ALBUMIN SERPL BCP-MCNC: 5.1 G/DL (ref 3.5–5.2)
ALP SERPL-CCNC: 97 U/L (ref 55–135)
ALT SERPL W/O P-5'-P-CCNC: 43 U/L (ref 10–44)
ANION GAP SERPL CALC-SCNC: 10 MMOL/L (ref 8–16)
AST SERPL-CCNC: 19 U/L (ref 10–40)
BASOPHILS # BLD AUTO: 0.06 K/UL (ref 0–0.2)
BASOPHILS NFR BLD: 0.6 % (ref 0–1.9)
BILIRUB SERPL-MCNC: 0.4 MG/DL (ref 0.1–1)
BILIRUB UR QL STRIP: NEGATIVE
BUN SERPL-MCNC: 11 MG/DL (ref 6–20)
CALCIUM SERPL-MCNC: 11.4 MG/DL (ref 8.7–10.5)
CHLORIDE SERPL-SCNC: 101 MMOL/L (ref 95–110)
CLARITY UR: CLEAR
CO2 SERPL-SCNC: 25 MMOL/L (ref 23–29)
COLOR UR: YELLOW
CREAT SERPL-MCNC: 1.1 MG/DL (ref 0.5–1.4)
DIFFERENTIAL METHOD: ABNORMAL
EOSINOPHIL # BLD AUTO: 0.2 K/UL (ref 0–0.5)
EOSINOPHIL NFR BLD: 1.5 % (ref 0–8)
ERYTHROCYTE [DISTWIDTH] IN BLOOD BY AUTOMATED COUNT: 11.9 % (ref 11.5–14.5)
EST. GFR  (NO RACE VARIABLE): >60 ML/MIN/1.73 M^2
GLUCOSE SERPL-MCNC: 94 MG/DL (ref 70–110)
GLUCOSE UR QL STRIP: NEGATIVE
HCT VFR BLD AUTO: 49.4 % (ref 40–54)
HGB BLD-MCNC: 17.2 G/DL (ref 14–18)
HGB UR QL STRIP: NEGATIVE
IMM GRANULOCYTES # BLD AUTO: 0.03 K/UL (ref 0–0.04)
IMM GRANULOCYTES NFR BLD AUTO: 0.3 % (ref 0–0.5)
KETONES UR QL STRIP: NEGATIVE
LEUKOCYTE ESTERASE UR QL STRIP: NEGATIVE
LIPASE SERPL-CCNC: 20 U/L (ref 4–60)
LYMPHOCYTES # BLD AUTO: 3.9 K/UL (ref 1–4.8)
LYMPHOCYTES NFR BLD: 35.5 % (ref 18–48)
MCH RBC QN AUTO: 29.4 PG (ref 27–31)
MCHC RBC AUTO-ENTMCNC: 34.8 G/DL (ref 32–36)
MCV RBC AUTO: 84 FL (ref 82–98)
MONOCYTES # BLD AUTO: 0.5 K/UL (ref 0.3–1)
MONOCYTES NFR BLD: 5 % (ref 4–15)
NEUTROPHILS # BLD AUTO: 6.2 K/UL (ref 1.8–7.7)
NEUTROPHILS NFR BLD: 57.1 % (ref 38–73)
NITRITE UR QL STRIP: NEGATIVE
NRBC BLD-RTO: 0 /100 WBC
PH UR STRIP: 7 [PH] (ref 5–8)
PLATELET # BLD AUTO: 465 K/UL (ref 150–450)
PMV BLD AUTO: 9.4 FL (ref 9.2–12.9)
POTASSIUM SERPL-SCNC: 3.5 MMOL/L (ref 3.5–5.1)
PROT SERPL-MCNC: 9.2 G/DL (ref 6–8.4)
PROT UR QL STRIP: NEGATIVE
RBC # BLD AUTO: 5.85 M/UL (ref 4.6–6.2)
SODIUM SERPL-SCNC: 136 MMOL/L (ref 136–145)
SP GR UR STRIP: 1.01 (ref 1–1.03)
URN SPEC COLLECT METH UR: NORMAL
UROBILINOGEN UR STRIP-ACNC: NEGATIVE EU/DL
WBC # BLD AUTO: 10.83 K/UL (ref 3.9–12.7)

## 2022-12-20 PROCEDURE — 25500020 PHARM REV CODE 255: Performed by: EMERGENCY MEDICINE

## 2022-12-20 PROCEDURE — 80053 COMPREHEN METABOLIC PANEL: CPT | Performed by: REGISTERED NURSE

## 2022-12-20 PROCEDURE — 25000003 PHARM REV CODE 250: Performed by: REGISTERED NURSE

## 2022-12-20 PROCEDURE — 96360 HYDRATION IV INFUSION INIT: CPT | Mod: 59

## 2022-12-20 PROCEDURE — 99285 EMERGENCY DEPT VISIT HI MDM: CPT | Mod: 25

## 2022-12-20 PROCEDURE — 85025 COMPLETE CBC W/AUTO DIFF WBC: CPT | Performed by: REGISTERED NURSE

## 2022-12-20 PROCEDURE — 83690 ASSAY OF LIPASE: CPT | Performed by: REGISTERED NURSE

## 2022-12-20 PROCEDURE — 81003 URINALYSIS AUTO W/O SCOPE: CPT | Performed by: REGISTERED NURSE

## 2022-12-20 RX ORDER — ONDANSETRON 4 MG/1
4 TABLET, ORALLY DISINTEGRATING ORAL
Status: COMPLETED | OUTPATIENT
Start: 2022-12-20 | End: 2022-12-20

## 2022-12-20 RX ORDER — AMOXICILLIN AND CLAVULANATE POTASSIUM 875; 125 MG/1; MG/1
1 TABLET, FILM COATED ORAL 2 TIMES DAILY
Qty: 20 TABLET | Refills: 0 | Status: SHIPPED | OUTPATIENT
Start: 2022-12-20 | End: 2022-12-30

## 2022-12-20 RX ORDER — HYOSCYAMINE SULFATE 0.125 MG
125 TABLET ORAL EVERY 4 HOURS PRN
Qty: 20 TABLET | Refills: 0 | Status: SHIPPED | OUTPATIENT
Start: 2022-12-20

## 2022-12-20 RX ORDER — HYDROCODONE BITARTRATE AND ACETAMINOPHEN 7.5; 325 MG/1; MG/1
1 TABLET ORAL EVERY 6 HOURS PRN
Qty: 15 TABLET | Refills: 0 | Status: ON HOLD | OUTPATIENT
Start: 2022-12-20 | End: 2023-03-29 | Stop reason: HOSPADM

## 2022-12-20 RX ORDER — HYDROCODONE BITARTRATE AND ACETAMINOPHEN 10; 325 MG/1; MG/1
1 TABLET ORAL
Status: COMPLETED | OUTPATIENT
Start: 2022-12-20 | End: 2022-12-20

## 2022-12-20 RX ADMIN — HYDROCODONE BITARTRATE AND ACETAMINOPHEN 1 TABLET: 10; 325 TABLET ORAL at 08:12

## 2022-12-20 RX ADMIN — IOHEXOL 100 ML: 350 INJECTION, SOLUTION INTRAVENOUS at 09:12

## 2022-12-20 RX ADMIN — ONDANSETRON 4 MG: 4 TABLET, ORALLY DISINTEGRATING ORAL at 08:12

## 2022-12-20 RX ADMIN — SODIUM CHLORIDE 1000 ML: 9 INJECTION, SOLUTION INTRAVENOUS at 08:12

## 2022-12-21 NOTE — ED PROVIDER NOTES
SCRIBE #1 NOTE: I, Dada Bonilla, am scribing for, and in the presence of, Keely Rodriguez MD. I have scribed the entire note.       History     Chief Complaint   Patient presents with    Abdominal Pain     Pt here with c/o abd pain/ cramping, nausea, rectal bleeding, bladder pressure/urgency, and diarrhea starting Thursday night. Pt has a hx of diverticulitis. States this feeling like a flare up.     Review of patient's allergies indicates:  No Known Allergies      History of Present Illness     HPI    12/20/2022, 8:34 PM  History obtained from the patient      History of Present Illness: David Sevilla is a 32 y.o. male patient with a PMHx of diverticulitis who presents to the Emergency Department for evaluation of lower abdominal pain which onset gradually 5 days ago. The pain has been worsening and pt states that he has blood in his stool when he has diarrhea. Symptoms are constant and moderate in severity. No mitigating or exacerbating factors reported. Associated sxs include anal bleeding, back pain, increased urinary frequency and urgency. Patient denies any nausea, vomiting, dysuria, fever, weakness, and all other sxs at this time. Prior Tx includes tylenol with no relief. No further complaints or concerns at this time.       Arrival mode: Personal vehicle    PCP: Prasad Alonso MD        Past Medical History:  Past Medical History:   Diagnosis Date    Diverticulitis        Past Surgical History:  No past surgical history on file.      Family History:  No family history on file.    Social History:  Social History     Tobacco Use    Smoking status: Not on file    Smokeless tobacco: Not on file   Substance and Sexual Activity    Alcohol use: Not on file    Drug use: Not on file    Sexual activity: Not on file        Review of Systems     Review of Systems   Constitutional:  Negative for fever.   HENT:  Negative for sore throat.    Respiratory:  Negative for shortness of breath.    Cardiovascular:   "Negative for chest pain.   Gastrointestinal:  Positive for abdominal pain (lower), anal bleeding, blood in stool and diarrhea. Negative for nausea and vomiting.   Genitourinary:  Positive for frequency and urgency. Negative for dysuria.   Musculoskeletal:  Positive for back pain.   Skin:  Negative for rash.   Neurological:  Negative for weakness.   Hematological:  Does not bruise/bleed easily.   All other systems reviewed and are negative.     Physical Exam     Initial Vitals [12/20/22 1802]   BP Pulse Resp Temp SpO2   (!) 149/85 97 16 98.3 °F (36.8 °C) 98 %      MAP       --          Physical Exam  Nursing Notes and Vital Signs Reviewed.  Constitutional: Patient is in mild distress. Well-developed and well-nourished.  Head: Atraumatic. Normocephalic.  Eyes: PERRL. EOM intact. Conjunctivae are not pale. No scleral icterus.  ENT: Mucous membranes are moist. Oropharynx is clear and symmetric.    Neck: Supple. Full ROM. No lymphadenopathy.  Cardiovascular: Regular rate. Regular rhythm. No murmurs, rubs, or gallops. Distal pulses are 2+ and symmetric.  Pulmonary/Chest: No respiratory distress. Clear to auscultation bilaterally. No wheezing or rales.  Abdominal: Soft and non-distended. LLQ and suprapubic tenderness.  No rebound, guarding, or rigidity. Good bowel sounds.  Genitourinary: L CVA tenderness  Musculoskeletal: Moves all extremities. No obvious deformities. No edema. No calf tenderness.  Skin: Warm and dry.  Neurological:  Alert, awake, and appropriate.  Normal speech.  No acute focal neurological deficits are appreciated.  Psychiatric: Normal affect. Good eye contact. Appropriate in content.     ED Course   Procedures  ED Vital Signs:  Vitals:    12/20/22 1802 12/20/22 2018   BP: (!) 149/85    Pulse: 97    Resp: 16 17   Temp: 98.3 °F (36.8 °C)    TempSrc: Oral    SpO2: 98%    Weight: 94.5 kg (208 lb 5.4 oz)    Height: 6' 3" (1.905 m)        Abnormal Lab Results:  Labs Reviewed   CBC W/ AUTO DIFFERENTIAL - " Abnormal; Notable for the following components:       Result Value    Platelets 465 (*)     All other components within normal limits   COMPREHENSIVE METABOLIC PANEL - Abnormal; Notable for the following components:    Calcium 11.4 (*)     Total Protein 9.2 (*)     All other components within normal limits   LIPASE   URINALYSIS, REFLEX TO URINE CULTURE    Narrative:     Specimen Source->Urine        All Lab Results:  Results for orders placed or performed during the hospital encounter of 12/20/22   CBC auto differential   Result Value Ref Range    WBC 10.83 3.90 - 12.70 K/uL    RBC 5.85 4.60 - 6.20 M/uL    Hemoglobin 17.2 14.0 - 18.0 g/dL    Hematocrit 49.4 40.0 - 54.0 %    MCV 84 82 - 98 fL    MCH 29.4 27.0 - 31.0 pg    MCHC 34.8 32.0 - 36.0 g/dL    RDW 11.9 11.5 - 14.5 %    Platelets 465 (H) 150 - 450 K/uL    MPV 9.4 9.2 - 12.9 fL    Immature Granulocytes 0.3 0.0 - 0.5 %    Gran # (ANC) 6.2 1.8 - 7.7 K/uL    Immature Grans (Abs) 0.03 0.00 - 0.04 K/uL    Lymph # 3.9 1.0 - 4.8 K/uL    Mono # 0.5 0.3 - 1.0 K/uL    Eos # 0.2 0.0 - 0.5 K/uL    Baso # 0.06 0.00 - 0.20 K/uL    nRBC 0 0 /100 WBC    Gran % 57.1 38.0 - 73.0 %    Lymph % 35.5 18.0 - 48.0 %    Mono % 5.0 4.0 - 15.0 %    Eosinophil % 1.5 0.0 - 8.0 %    Basophil % 0.6 0.0 - 1.9 %    Differential Method Automated    Comprehensive metabolic panel   Result Value Ref Range    Sodium 136 136 - 145 mmol/L    Potassium 3.5 3.5 - 5.1 mmol/L    Chloride 101 95 - 110 mmol/L    CO2 25 23 - 29 mmol/L    Glucose 94 70 - 110 mg/dL    BUN 11 6 - 20 mg/dL    Creatinine 1.1 0.5 - 1.4 mg/dL    Calcium 11.4 (H) 8.7 - 10.5 mg/dL    Total Protein 9.2 (H) 6.0 - 8.4 g/dL    Albumin 5.1 3.5 - 5.2 g/dL    Total Bilirubin 0.4 0.1 - 1.0 mg/dL    Alkaline Phosphatase 97 55 - 135 U/L    AST 19 10 - 40 U/L    ALT 43 10 - 44 U/L    Anion Gap 10 8 - 16 mmol/L    eGFR >60 >60 mL/min/1.73 m^2   Lipase   Result Value Ref Range    Lipase 20 4 - 60 U/L   Urinalysis, Reflex to Urine Culture Urine,  Clean Catch    Specimen: Urine   Result Value Ref Range    Specimen UA Urine, Clean Catch     Color, UA Yellow Yellow, Straw, Phuong    Appearance, UA Clear Clear    pH, UA 7.0 5.0 - 8.0    Specific Gravity, UA 1.010 1.005 - 1.030    Protein, UA Negative Negative    Glucose, UA Negative Negative    Ketones, UA Negative Negative    Bilirubin (UA) Negative Negative    Occult Blood UA Negative Negative    Nitrite, UA Negative Negative    Urobilinogen, UA Negative <2.0 EU/dL    Leukocytes, UA Negative Negative         Imaging Results:  Imaging Results              CT Abdomen Pelvis With Contrast (Final result)  Result time 12/20/22 21:40:37      Final result by Tomas Espinoza MD (12/20/22 21:40:37)                   Impression:      Colonic diverticulosis. Minimal fat stranding adjacent to the sigmoid colon see for example axial image 161 series 2.  Slight diverticulitis is not excluded.    Fatty infiltration of liver with hepatomegaly.    All CT scans at this facility use dose modulation, iterative reconstruction, and/or weight based dosing when appropriate to reduce radiation dose to as low as reasonably achievable.      Electronically signed by: Alexis Marin  Date:    12/20/2022  Time:    21:40               Narrative:    EXAMINATION:  CT ABDOMEN PELVIS WITH CONTRAST    CLINICAL HISTORY:  LLQ abdominal pain;    TECHNIQUE:  Low dose axial images, sagittal and coronal reformations were obtained from the lung bases to the pubic symphysis following the IV administration of 100 mL of Omnipaque 350.    COMPARISON:  None    FINDINGS:  Heart: Normal size as far as seen. No effusion as far as seen.    Lung Bases: Clear.    Liver: Fatty infiltration of the liver with hepatomegaly.  No focal lesions.    Gallbladder: No calcified gallstones.    Bile Ducts: No dilatation.    Pancreas: No mass. No peripancreatic fat stranding.    Spleen: Normal.    Adrenals: Normal.    Kidneys/Ureters: Normal enhancement.  No mass or   hydroureteronephrosis.    Bladder: No wall thickening.    Reproductive organs: Normal.    GI Tract/Mesentery: No evidence of bowel obstruction.  No evidence of acute appendicitis.  Colonic diverticulosis.  Minimal fat stranding adjacent to the sigmoid colon see for example axial image 161 series 2.  Slight diverticulitis is not excluded.  Normal appendix.    Peritoneal Space: No ascites or free air.    Retroperitoneum: No significant adenopathy.    Abdominal wall: Normal.    Vasculature: No aneurysm.    Bones: No acute fracture. No suspicious lytic or sclerotic lesions.                                                The Emergency Provider reviewed the vital signs and test results, which are outlined above.     ED Discussion       9:52 PM: Reassessed pt at this time. Discussed with pt all pertinent ED information and results. Discussed pt dx and plan of tx. Gave pt all f/u and return to the ED instructions. All questions and concerns were addressed at this time. Pt expresses understanding of information and instructions, and is comfortable with plan to discharge. Pt is stable for discharge.    I discussed with patient and/or family/caretaker that evaluation in the ED does not suggest any emergent or life threatening medical conditions requiring immediate intervention beyond what was provided in the ED, and I believe patient is safe for discharge.  Regardless, an unremarkable evaluation in the ED does not preclude the development or presence of a serious of life threatening condition. As such, patient was instructed to return immediately for any worsening or change in current symptoms.         Medical Decision Making:   Clinical Tests:   Lab Tests: Ordered and Reviewed  Radiological Study: Ordered and Reviewed         ED Medication(s):  Medications   sodium chloride 0.9% bolus 1,000 mL 1,000 mL (0 mLs Intravenous Stopped 12/20/22 2123)   HYDROcodone-acetaminophen  mg per tablet 1 tablet (1 tablet Oral Given  12/20/22 2018)   ondansetron disintegrating tablet 4 mg (4 mg Oral Given 12/20/22 2018)   iohexoL (OMNIPAQUE 350) injection 100 mL (100 mLs Intravenous Given 12/20/22 2119)       Discharge Medication List as of 12/20/2022  9:52 PM        START taking these medications    Details   amoxicillin-clavulanate 875-125mg (AUGMENTIN) 875-125 mg per tablet Take 1 tablet by mouth 2 (two) times daily. for 10 days, Starting Tue 12/20/2022, Until Fri 12/30/2022, Print      HYDROcodone-acetaminophen (NORCO) 7.5-325 mg per tablet Take 1 tablet by mouth every 6 (six) hours as needed for Pain., Starting Tue 12/20/2022, Print              Follow-up Information       Prasad Alonso MD In 2 days.    Specialties: Colon and Rectal Surgery, General Surgery  Contact information:  [address] [place] [address] [phone]               [facility] - Emergency Dept..    Specialty: Emergency Medicine  Why: As needed, If symptoms worsen  Contact information:  [id number] [facility] [id number] [phone]                               Scribe Attestation:   Scribe #1: I performed the above scribed service and the documentation accurately describes the services I performed. I attest to the accuracy of the note.     Attending:   Physician Attestation Statement for Scribe #1: I, Keely Rodriguez MD, personally performed the services described in this documentation, as scribed by Dada Bonilla, in my presence, and it is both accurate and complete.           Clinical Impression       ICD-10-CM ICD-9-CM   1. Diverticulitis large intestine w/o perforation or abscess w/bleeding  K57.33 562.13       Disposition:   Disposition: Discharged  Condition: Stable       Keely Rodriguez MD  12/21/22 6064

## 2022-12-21 NOTE — FIRST PROVIDER EVALUATION
"Medical screening examination initiated.  I have conducted a focused provider triage encounter, findings are as follows:    Brief history of present illness:  Lower abdominal cramping, diarrhea, nausea with history of diverticulitis, symptoms began several days ago    Vitals:    12/20/22 1802   BP: (!) 149/85   BP Location: Right arm   Patient Position: Sitting   Pulse: 97   Resp: 16   Temp: 98.3 °F (36.8 °C)   TempSrc: Oral   SpO2: 98%   Weight: 94.5 kg (208 lb 5.4 oz)   Height: 6' 3" (1.905 m)       Pertinent physical exam:  No acute distress, vital signs stable, patient alert and oriented    Brief workup plan:  Labs and CT    Preliminary workup initiated; this workup will be continued and followed by the physician or advanced practice provider that is assigned to the patient when roomed.  "

## 2023-01-02 ENCOUNTER — PATIENT MESSAGE (OUTPATIENT)
Dept: SURGERY | Facility: CLINIC | Age: 33
End: 2023-01-02
Payer: MEDICAID

## 2023-01-03 ENCOUNTER — HOSPITAL ENCOUNTER (INPATIENT)
Facility: HOSPITAL | Age: 33
LOS: 6 days | Discharge: HOME | DRG: 331 | End: 2023-01-09
Attending: EMERGENCY MEDICINE | Admitting: COLON & RECTAL SURGERY
Payer: MEDICAID

## 2023-01-03 DIAGNOSIS — K57.92 ACUTE DIVERTICULITIS: ICD-10-CM

## 2023-01-03 DIAGNOSIS — K57.92 DIVERTICULITIS: Primary | ICD-10-CM

## 2023-01-03 LAB
ALBUMIN SERPL BCP-MCNC: 4.5 G/DL (ref 3.5–5.2)
ALP SERPL-CCNC: 91 U/L (ref 55–135)
ALT SERPL W/O P-5'-P-CCNC: 41 U/L (ref 10–44)
ANION GAP SERPL CALC-SCNC: 15 MMOL/L (ref 8–16)
AST SERPL-CCNC: 19 U/L (ref 10–40)
BASOPHILS # BLD AUTO: 0.05 K/UL (ref 0–0.2)
BASOPHILS NFR BLD: 0.4 % (ref 0–1.9)
BILIRUB SERPL-MCNC: 0.7 MG/DL (ref 0.1–1)
BILIRUB UR QL STRIP: NEGATIVE
BUN SERPL-MCNC: 10 MG/DL (ref 6–20)
CALCIUM SERPL-MCNC: 10.5 MG/DL (ref 8.7–10.5)
CHLORIDE SERPL-SCNC: 104 MMOL/L (ref 95–110)
CLARITY UR: CLEAR
CO2 SERPL-SCNC: 22 MMOL/L (ref 23–29)
COLOR UR: YELLOW
CREAT SERPL-MCNC: 1 MG/DL (ref 0.5–1.4)
DIFFERENTIAL METHOD: ABNORMAL
EOSINOPHIL # BLD AUTO: 0.1 K/UL (ref 0–0.5)
EOSINOPHIL NFR BLD: 1 % (ref 0–8)
ERYTHROCYTE [DISTWIDTH] IN BLOOD BY AUTOMATED COUNT: 12.2 % (ref 11.5–14.5)
EST. GFR  (NO RACE VARIABLE): >60 ML/MIN/1.73 M^2
GLUCOSE SERPL-MCNC: 96 MG/DL (ref 70–110)
GLUCOSE UR QL STRIP: NEGATIVE
HCT VFR BLD AUTO: 47.7 % (ref 40–54)
HGB BLD-MCNC: 16.6 G/DL (ref 14–18)
HGB UR QL STRIP: NEGATIVE
IMM GRANULOCYTES # BLD AUTO: 0.06 K/UL (ref 0–0.04)
IMM GRANULOCYTES NFR BLD AUTO: 0.5 % (ref 0–0.5)
KETONES UR QL STRIP: NEGATIVE
LEUKOCYTE ESTERASE UR QL STRIP: NEGATIVE
LIPASE SERPL-CCNC: 18 U/L (ref 4–60)
LYMPHOCYTES # BLD AUTO: 2.9 K/UL (ref 1–4.8)
LYMPHOCYTES NFR BLD: 22.2 % (ref 18–48)
MCH RBC QN AUTO: 29.4 PG (ref 27–31)
MCHC RBC AUTO-ENTMCNC: 34.8 G/DL (ref 32–36)
MCV RBC AUTO: 84 FL (ref 82–98)
MONOCYTES # BLD AUTO: 0.8 K/UL (ref 0.3–1)
MONOCYTES NFR BLD: 6 % (ref 4–15)
NEUTROPHILS # BLD AUTO: 9.3 K/UL (ref 1.8–7.7)
NEUTROPHILS NFR BLD: 69.9 % (ref 38–73)
NITRITE UR QL STRIP: NEGATIVE
NRBC BLD-RTO: 0 /100 WBC
PH UR STRIP: 8 [PH] (ref 5–8)
PLATELET # BLD AUTO: 359 K/UL (ref 150–450)
PMV BLD AUTO: 9.5 FL (ref 9.2–12.9)
POTASSIUM SERPL-SCNC: 4.7 MMOL/L (ref 3.5–5.1)
PROT SERPL-MCNC: 8 G/DL (ref 6–8.4)
PROT UR QL STRIP: NEGATIVE
RBC # BLD AUTO: 5.65 M/UL (ref 4.6–6.2)
SODIUM SERPL-SCNC: 141 MMOL/L (ref 136–145)
SP GR UR STRIP: 1.01 (ref 1–1.03)
URN SPEC COLLECT METH UR: NORMAL
UROBILINOGEN UR STRIP-ACNC: NEGATIVE EU/DL
WBC # BLD AUTO: 13.22 K/UL (ref 3.9–12.7)

## 2023-01-03 PROCEDURE — 63600175 PHARM REV CODE 636 W HCPCS: Performed by: EMERGENCY MEDICINE

## 2023-01-03 PROCEDURE — 80053 COMPREHEN METABOLIC PANEL: CPT | Performed by: REGISTERED NURSE

## 2023-01-03 PROCEDURE — 63600175 PHARM REV CODE 636 W HCPCS: Performed by: COLON & RECTAL SURGERY

## 2023-01-03 PROCEDURE — 96375 TX/PRO/DX INJ NEW DRUG ADDON: CPT

## 2023-01-03 PROCEDURE — 96365 THER/PROPH/DIAG IV INF INIT: CPT

## 2023-01-03 PROCEDURE — 25000003 PHARM REV CODE 250: Performed by: EMERGENCY MEDICINE

## 2023-01-03 PROCEDURE — 81003 URINALYSIS AUTO W/O SCOPE: CPT | Performed by: REGISTERED NURSE

## 2023-01-03 PROCEDURE — 21400001 HC TELEMETRY ROOM

## 2023-01-03 PROCEDURE — 85025 COMPLETE CBC W/AUTO DIFF WBC: CPT | Performed by: REGISTERED NURSE

## 2023-01-03 PROCEDURE — 25000003 PHARM REV CODE 250: Performed by: REGISTERED NURSE

## 2023-01-03 PROCEDURE — 99285 EMERGENCY DEPT VISIT HI MDM: CPT | Mod: 25

## 2023-01-03 PROCEDURE — 25000003 PHARM REV CODE 250: Performed by: COLON & RECTAL SURGERY

## 2023-01-03 PROCEDURE — 83690 ASSAY OF LIPASE: CPT | Performed by: REGISTERED NURSE

## 2023-01-03 PROCEDURE — 25500020 PHARM REV CODE 255: Performed by: EMERGENCY MEDICINE

## 2023-01-03 PROCEDURE — 96361 HYDRATE IV INFUSION ADD-ON: CPT

## 2023-01-03 RX ORDER — OXYCODONE HYDROCHLORIDE 5 MG/1
5 TABLET ORAL EVERY 4 HOURS PRN
Status: DISCONTINUED | OUTPATIENT
Start: 2023-01-03 | End: 2023-01-09 | Stop reason: HOSPADM

## 2023-01-03 RX ORDER — BACLOFEN 20 MG/1
20 TABLET ORAL 3 TIMES DAILY PRN
COMMUNITY
Start: 2022-12-29

## 2023-01-03 RX ORDER — SODIUM CHLORIDE, SODIUM LACTATE, POTASSIUM CHLORIDE, CALCIUM CHLORIDE 600; 310; 30; 20 MG/100ML; MG/100ML; MG/100ML; MG/100ML
1000 INJECTION, SOLUTION INTRAVENOUS CONTINUOUS
Status: DISCONTINUED | OUTPATIENT
Start: 2023-01-03 | End: 2023-01-04

## 2023-01-03 RX ORDER — SODIUM CHLORIDE, SODIUM LACTATE, POTASSIUM CHLORIDE, CALCIUM CHLORIDE 600; 310; 30; 20 MG/100ML; MG/100ML; MG/100ML; MG/100ML
INJECTION, SOLUTION INTRAVENOUS CONTINUOUS
Status: DISCONTINUED | OUTPATIENT
Start: 2023-01-03 | End: 2023-01-08

## 2023-01-03 RX ORDER — OXYCODONE HYDROCHLORIDE 5 MG/1
10 TABLET ORAL EVERY 4 HOURS PRN
Status: DISCONTINUED | OUTPATIENT
Start: 2023-01-03 | End: 2023-01-09 | Stop reason: HOSPADM

## 2023-01-03 RX ORDER — HYDROCODONE BITARTRATE AND ACETAMINOPHEN 10; 325 MG/1; MG/1
1 TABLET ORAL
Status: COMPLETED | OUTPATIENT
Start: 2023-01-03 | End: 2023-01-03

## 2023-01-03 RX ORDER — HYDROMORPHONE HYDROCHLORIDE 1 MG/ML
0.5 INJECTION, SOLUTION INTRAMUSCULAR; INTRAVENOUS; SUBCUTANEOUS
Status: DISCONTINUED | OUTPATIENT
Start: 2023-01-03 | End: 2023-01-09 | Stop reason: HOSPADM

## 2023-01-03 RX ORDER — HYDROMORPHONE HYDROCHLORIDE 1 MG/ML
1 INJECTION, SOLUTION INTRAMUSCULAR; INTRAVENOUS; SUBCUTANEOUS EVERY 4 HOURS PRN
Status: DISCONTINUED | OUTPATIENT
Start: 2023-01-03 | End: 2023-01-03

## 2023-01-03 RX ORDER — TALC
6 POWDER (GRAM) TOPICAL NIGHTLY PRN
Status: DISCONTINUED | OUTPATIENT
Start: 2023-01-03 | End: 2023-01-09 | Stop reason: HOSPADM

## 2023-01-03 RX ORDER — ACETAMINOPHEN 325 MG/1
650 TABLET ORAL EVERY 8 HOURS PRN
Status: DISCONTINUED | OUTPATIENT
Start: 2023-01-03 | End: 2023-01-09 | Stop reason: HOSPADM

## 2023-01-03 RX ORDER — SODIUM CHLORIDE 0.9 % (FLUSH) 0.9 %
10 SYRINGE (ML) INJECTION
Status: DISCONTINUED | OUTPATIENT
Start: 2023-01-03 | End: 2023-01-09 | Stop reason: HOSPADM

## 2023-01-03 RX ORDER — HYDROMORPHONE HYDROCHLORIDE 1 MG/ML
0.5 INJECTION, SOLUTION INTRAMUSCULAR; INTRAVENOUS; SUBCUTANEOUS
Status: COMPLETED | OUTPATIENT
Start: 2023-01-03 | End: 2023-01-03

## 2023-01-03 RX ORDER — HYDROMORPHONE HYDROCHLORIDE 1 MG/ML
0.5 INJECTION, SOLUTION INTRAMUSCULAR; INTRAVENOUS; SUBCUTANEOUS EVERY 4 HOURS PRN
Status: DISCONTINUED | OUTPATIENT
Start: 2023-01-03 | End: 2023-01-03

## 2023-01-03 RX ORDER — LIDOCAINE HYDROCHLORIDE 10 MG/ML
1 INJECTION, SOLUTION EPIDURAL; INFILTRATION; INTRACAUDAL; PERINEURAL ONCE AS NEEDED
Status: DISCONTINUED | OUTPATIENT
Start: 2023-01-03 | End: 2023-01-09 | Stop reason: HOSPADM

## 2023-01-03 RX ADMIN — IOHEXOL 30 ML: 350 INJECTION, SOLUTION INTRAVENOUS at 04:01

## 2023-01-03 RX ADMIN — PIPERACILLIN SODIUM AND TAZOBACTAM SODIUM 4.5 G: 4; .5 INJECTION, POWDER, LYOPHILIZED, FOR SOLUTION INTRAVENOUS at 10:01

## 2023-01-03 RX ADMIN — PROMETHAZINE HYDROCHLORIDE 12.5 MG: 25 INJECTION INTRAMUSCULAR; INTRAVENOUS at 04:01

## 2023-01-03 RX ADMIN — SODIUM CHLORIDE 1000 ML: 9 INJECTION, SOLUTION INTRAVENOUS at 02:01

## 2023-01-03 RX ADMIN — ACETAMINOPHEN 650 MG: 325 TABLET ORAL at 08:01

## 2023-01-03 RX ADMIN — HYDROCODONE BITARTRATE AND ACETAMINOPHEN 1 TABLET: 10; 325 TABLET ORAL at 02:01

## 2023-01-03 RX ADMIN — OXYCODONE HYDROCHLORIDE 10 MG: 5 TABLET ORAL at 10:01

## 2023-01-03 RX ADMIN — PROMETHAZINE HYDROCHLORIDE 12.5 MG: 25 INJECTION INTRAMUSCULAR; INTRAVENOUS at 10:01

## 2023-01-03 RX ADMIN — HYDROMORPHONE HYDROCHLORIDE 0.5 MG: 1 INJECTION, SOLUTION INTRAMUSCULAR; INTRAVENOUS; SUBCUTANEOUS at 04:01

## 2023-01-03 RX ADMIN — IOHEXOL 100 ML: 350 INJECTION, SOLUTION INTRAVENOUS at 04:01

## 2023-01-03 RX ADMIN — SODIUM CHLORIDE, POTASSIUM CHLORIDE, SODIUM LACTATE AND CALCIUM CHLORIDE: 600; 310; 30; 20 INJECTION, SOLUTION INTRAVENOUS at 10:01

## 2023-01-03 NOTE — ED PROVIDER NOTES
SCRIBE #1 NOTE: I, Alex Sánchez/Nieves Friend, am scribing for, and in the presence of, Mine Johnson DO. I have scribed the entire note.      History      Chief Complaint   Patient presents with    Abdominal Pain     Dx with diverticulitis. Finished antibiotics a week ago. Has herman with dr. Alonso tomorrow. Pain is too bad to wait.        Review of patient's allergies indicates:   Allergen Reactions    Morphine Nausea And Vomiting and Other (See Comments)     migraine        HPI   HPI    1/3/2023, 4:52 PM   History obtained from the patient      History of Present Illness: David Sevilla is a 32 y.o. male patient with a history of diverticulitis who presents to the Emergency Department for lower abdominal pain that began this morning at 0100.  The pain is located in suprapubic region.  Symptoms are constant and moderate in severity. It does not radiate.   It is associated with nausea.  There is no associated fever, cough, shortness of breath, difficulty breathing, dysuria, urgency, frequency, diarrhea.  Pain is rated as severe.  Recent diverticulitis on 12/20/2022.  Patient had completed his course of Augmentin.    Reviewed   old records:  Repeated episodes of diverticulitis. [id number], 11/28/2022 and 12/202022.  Followed by colorectal surgeon, Dr. Alonso.  Last seen 12/05/2022.      Per the colorectal surgeons last notes, a sigmoidectomy was recommended.        Arrival mode: Personal vehicle      PCP: Prasad Alonso MD       Past Medical History:  Past Medical History:   Diagnosis Date    Diverticulitis     Diverticulitis        Past Surgical History:  No past surgical history on file.      Family History:  No family history on file.    Social History:    Vaping nicotine products, no alcohol, marijuana use    ROS   Review of Systems   Constitutional:  Negative for fever.   HENT:  Negative for sore throat.    Respiratory:  Negative for shortness of breath.    Cardiovascular:  Negative for chest  pain.   Gastrointestinal:  Positive for abdominal pain (lower). Negative for nausea.   Genitourinary:  Negative for dysuria.   Musculoskeletal:  Negative for back pain.   Skin:  Negative for rash.   Neurological:  Negative for weakness.   Hematological:  Does not bruise/bleed easily.   All other systems reviewed and are negative.    Physical Exam      Initial Vitals [01/03/23 1059]   BP Pulse Resp Temp SpO2   (!) 141/85 (!) 123 18 98.3 °F (36.8 °C) 99 %      MAP       --          Physical Exam  Nursing Notes and Vital Signs Reviewed.  Constitutional: Patient is in no acute distress. Well-developed and well-nourished.  Head: Atraumatic. Normocephalic.  Eyes: PERRL. EOM intact. Conjunctivae are not pale. No scleral icterus.  ENT: Mucous membranes are moist. Oropharynx is clear and symmetric.    Neck: Supple. Full ROM.   Cardiovascular: Regular rate. Regular rhythm. No murmurs, rubs, or gallops. Distal pulses are 2+ and symmetric.  Pulmonary/Chest: No respiratory distress. Clear to auscultation bilaterally. No wheezing or rales.  Abdominal: Soft and non-distended.  There is LLQ tenderness.  No rebound, guarding, or rigidity.   Musculoskeletal: Moves all extremities. No obvious deformities. No edema.  Skin: Warm and dry.  Neurological:  Alert, awake, and appropriate.  Normal speech.  No acute focal neurological deficits are appreciated.  Psychiatric: Normal affect. Good eye contact. Appropriate in content.    ED Course    Procedures  ED Vital Signs:  Vitals:    01/03/23 1059 01/03/23 1437 01/03/23 1636 01/03/23 1700   BP: (!) 141/85  125/79 118/71   Pulse: (!) 123  87 88   Resp: 18 18 15 18   Temp: 98.3 °F (36.8 °C)      TempSrc: Oral      SpO2: 99%  99% 100%   Weight: 93.2 kg (205 lb 9.3 oz)       01/03/23 1706 01/03/23 1900 01/03/23 2047 01/03/23 2132   BP:  115/72 129/70 (!) 111/56   Pulse:  84 84 80   Resp:  15 15 14   Temp: 98.6 °F (37 °C)      TempSrc: Oral      SpO2:  99% 98% 96%   Weight:           Abnormal Lab  Results:  Labs Reviewed   CBC W/ AUTO DIFFERENTIAL - Abnormal; Notable for the following components:       Result Value    WBC 13.22 (*)     Gran # (ANC) 9.3 (*)     Immature Grans (Abs) 0.06 (*)     All other components within normal limits   COMPREHENSIVE METABOLIC PANEL - Abnormal; Notable for the following components:    CO2 22 (*)     All other components within normal limits   LIPASE   URINALYSIS, REFLEX TO URINE CULTURE    Narrative:     Specimen Source->Urine        All Lab Results:  Results for orders placed or performed during the hospital encounter of 01/03/23   CBC auto differential   Result Value Ref Range    WBC 13.22 (H) 3.90 - 12.70 K/uL    RBC 5.65 4.60 - 6.20 M/uL    Hemoglobin 16.6 14.0 - 18.0 g/dL    Hematocrit 47.7 40.0 - 54.0 %    MCV 84 82 - 98 fL    MCH 29.4 27.0 - 31.0 pg    MCHC 34.8 32.0 - 36.0 g/dL    RDW 12.2 11.5 - 14.5 %    Platelets 359 150 - 450 K/uL    MPV 9.5 9.2 - 12.9 fL    Immature Granulocytes 0.5 0.0 - 0.5 %    Gran # (ANC) 9.3 (H) 1.8 - 7.7 K/uL    Immature Grans (Abs) 0.06 (H) 0.00 - 0.04 K/uL    Lymph # 2.9 1.0 - 4.8 K/uL    Mono # 0.8 0.3 - 1.0 K/uL    Eos # 0.1 0.0 - 0.5 K/uL    Baso # 0.05 0.00 - 0.20 K/uL    nRBC 0 0 /100 WBC    Gran % 69.9 38.0 - 73.0 %    Lymph % 22.2 18.0 - 48.0 %    Mono % 6.0 4.0 - 15.0 %    Eosinophil % 1.0 0.0 - 8.0 %    Basophil % 0.4 0.0 - 1.9 %    Differential Method Automated    Comprehensive metabolic panel   Result Value Ref Range    Sodium 141 136 - 145 mmol/L    Potassium 4.7 3.5 - 5.1 mmol/L    Chloride 104 95 - 110 mmol/L    CO2 22 (L) 23 - 29 mmol/L    Glucose 96 70 - 110 mg/dL    BUN 10 6 - 20 mg/dL    Creatinine 1.0 0.5 - 1.4 mg/dL    Calcium 10.5 8.7 - 10.5 mg/dL    Total Protein 8.0 6.0 - 8.4 g/dL    Albumin 4.5 3.5 - 5.2 g/dL    Total Bilirubin 0.7 0.1 - 1.0 mg/dL    Alkaline Phosphatase 91 55 - 135 U/L    AST 19 10 - 40 U/L    ALT 41 10 - 44 U/L    Anion Gap 15 8 - 16 mmol/L    eGFR >60 >60 mL/min/1.73 m^2   Lipase   Result Value  Ref Range    Lipase 18 4 - 60 U/L   Urinalysis, Reflex to Urine Culture Urine, Clean Catch    Specimen: Urine   Result Value Ref Range    Specimen UA Urine, Clean Catch     Color, UA Yellow Yellow, Straw, Phuong    Appearance, UA Clear Clear    pH, UA 8.0 5.0 - 8.0    Specific Gravity, UA 1.015 1.005 - 1.030    Protein, UA Negative Negative    Glucose, UA Negative Negative    Ketones, UA Negative Negative    Bilirubin (UA) Negative Negative    Occult Blood UA Negative Negative    Nitrite, UA Negative Negative    Urobilinogen, UA Negative <2.0 EU/dL    Leukocytes, UA Negative Negative     Imaging Results:  Imaging Results              CT Abdomen Pelvis With Contrast (Final result)  Result time 01/03/23 16:14:05      Final result by STORM Zuñiga Sr., MD (01/03/23 16:14:05)                   Impression:      1. There are multiple diverticula in the descending and sigmoid portions of the colon.  There are moderate inflammatory changes adjacent to the sigmoid colon.  This is characteristic of diverticulitis of the sigmoid colon.  2. There are several noncalcified pulmonary nodules in the visualized portion of the lungs. One of the larger ones measures 5 mm and is located in the posterior aspect of right lower lobe.  On the prior examination it measured 5 mm.  All CT scans at this facility use dose modulation, iterative reconstruction, and/or weight base dosing when appropriate to reduce radiation dose when appropriate to reduce radiation dose to as low as reasonably achievable.      Electronically signed by: Emiliano Zuñiga MD  Date:    01/03/2023  Time:    16:14               Narrative:    EXAMINATION:  CT ABDOMEN PELVIS WITH CONTRAST    CLINICAL HISTORY:  LLQ abdominal pain;    TECHNIQUE:  Standard abdomen and pelvis CT protocol with oral and IV contrast was performed.  100 mL of Omnipaque 350 contrast material was used for this examination.    COMPARISON:  12/20/2022    FINDINGS:  Finding: The size of the heart is  within normal limits.  There are several noncalcified pulmonary nodules in the visualized portion of the lungs.  One of the larger ones measures 5 mm and is located in the posterior aspect of right lower lobe.  On the prior examination it measured 5 mm.  There is no pneumothorax or pleural effusion.    The liver, gallbladder, pancreas, spleen, adrenals, and kidneys are normal in appearance. The ureters and the urinary bladder are normal in appearance.  The prostate is normal in appearance.  The appendix is normal in appearance.  There are multiple diverticula in the descending and sigmoid portions of the colon.  There are moderate inflammatory changes adjacent to the sigmoid colon.  There is no free fluid within the abdomen or pelvis. There is no pneumoperitoneum.                                              The Emergency Provider reviewed the vital signs and test results, which are outlined above.    ED Discussion     5:02 PM: Discussed pt's case with Dr. Alonso (General Surgery) who recommends keeping the pt NPO, starting IV abx, and admission to his service for surgery. Dr. Alonso agrees with current care and management of pt and accepts admission.   Admitting Service: General Surgery  Admitting Physician: Dr. Alonso  Admit to: Obs Med Surg    5:03 PM: Re-evaluated pt. I have discussed test results, shared treatment plan, and the need for admission with patient and family at bedside. Pt and family express understanding at this time and agree with all information. All questions answered. Pt and family have no further questions or concerns at this time. Pt is ready for admit.      ED Course as of 01/03/23 2156   Tue Jan 03, 2023   1658 Patient is agreeable to admission.  Patient has had multiple bouts of diverticulitis with multiple rounds of antibiotic.  Next step is surgical intervention.  Case was discussed with colorectal recommended admission to the hospital [LB]      ED Course User Index  [LB] Mine MEHTA  DO Alex       ED Medication(s):  Medications   LIDOcaine (PF) 10 mg/ml (1%) injection 10 mg (has no administration in time range)   sodium chloride 0.9% flush 10 mL (has no administration in time range)   melatonin tablet 6 mg (has no administration in time range)   acetaminophen tablet 650 mg (650 mg Oral Given 1/3/23 2030)   piperacillin-tazobactam (ZOSYN) 4.5 g in dextrose 5 % in water (D5W) 5 % 100 mL IVPB (MB+) (has no administration in time range)   oxyCODONE immediate release tablet 5 mg (has no administration in time range)   oxyCODONE immediate release tablet 10 mg (has no administration in time range)   HYDROmorphone injection 0.5 mg (has no administration in time range)   lactated ringers infusion (has no administration in time range)   sodium chloride 0.9% bolus 1,000 mL 1,000 mL (0 mLs Intravenous Stopped 1/3/23 1542)   HYDROcodone-acetaminophen  mg per tablet 1 tablet (1 tablet Oral Given 1/3/23 1437)   HYDROmorphone injection 0.5 mg (0.5 mg Intravenous Given 1/3/23 1636)   promethazine (PHENERGAN) 12.5 mg in dextrose 5 % 50 mL IVPB (0 mg Intravenous Stopped 1/3/23 1650)   iohexoL (OMNIPAQUE 350) injection 100 mL (100 mLs Intravenous Given 1/3/23 1602)   iohexoL (OMNIPAQUE 350) injection 30 mL (30 mLs Oral Given 1/3/23 1602)             Medical Decision Making    Medical Decision Making:   History:   Old Medical Records: I decided to obtain old medical records.  Old Records Summarized: records from clinic visits and records from previous admission(s).  Initial Assessment:   32-year-old male presenting to emergency department with suprapubic/left lower quadrant pain.  Multiple bouts of sigmoid diverticulitis.  Most recently December 20, 2022.  Patient had completed his course of Augmentin.  Patient is tender to palpation left lower quadrant.  Patient is being followed by colorectal surgeon Dr. Alonso.  Per office notes, patient may require operative intervention.  Case was discussed with  colorectal surgeon-recommended patient be admitted to hospital for possible operative intervention.  Clinical Tests:   Lab Tests: Ordered and Reviewed  Radiological Study: Ordered and Reviewed         Scribe Attestation:   Scribe #1: I performed the above scribed service and the documentation accurately describes the services I performed. I attest to the accuracy of the note.    Attending:   Physician Attestation Statement for Scribe #1: I, Mine Johnson DO, personally performed the services described in this documentation, as scribed by Alex Sánchez/Nieves Friend, in my presence, and it is both accurate and complete.          Clinical Impression       ICD-10-CM ICD-9-CM   1. Diverticulitis  K57.92 562.11       Disposition:   Disposition: Placed in Observation  Condition: Fair       Mine Johnson DO  01/03/23 2156

## 2023-01-03 NOTE — FIRST PROVIDER EVALUATION
Medical screening examination initiated.  I have conducted a focused provider triage encounter, findings are as follows:    Brief history of present illness:  Lower abdominal pain, history of diverticulitis    Vitals:    01/03/23 1059   BP: (!) 141/85   BP Location: Right arm   Pulse: (!) 123   Resp: 18   Temp: 98.3 °F (36.8 °C)   TempSrc: Oral   SpO2: 99%   Weight: 93.2 kg (205 lb 9.3 oz)       Pertinent physical exam:  Tachycardic, no acute distress, patient alert and oriented    Brief workup plan:  Workup and further evaluation    Preliminary workup initiated; this workup will be continued and followed by the physician or advanced practice provider that is assigned to the patient when roomed.

## 2023-01-04 LAB
ANION GAP SERPL CALC-SCNC: 11 MMOL/L (ref 8–16)
BASOPHILS # BLD AUTO: 0.05 K/UL (ref 0–0.2)
BASOPHILS NFR BLD: 0.5 % (ref 0–1.9)
BUN SERPL-MCNC: 9 MG/DL (ref 6–20)
CALCIUM SERPL-MCNC: 9.8 MG/DL (ref 8.7–10.5)
CHLORIDE SERPL-SCNC: 103 MMOL/L (ref 95–110)
CO2 SERPL-SCNC: 25 MMOL/L (ref 23–29)
CREAT SERPL-MCNC: 1 MG/DL (ref 0.5–1.4)
DIFFERENTIAL METHOD: NORMAL
EOSINOPHIL # BLD AUTO: 0.2 K/UL (ref 0–0.5)
EOSINOPHIL NFR BLD: 1.8 % (ref 0–8)
ERYTHROCYTE [DISTWIDTH] IN BLOOD BY AUTOMATED COUNT: 12.1 % (ref 11.5–14.5)
EST. GFR  (NO RACE VARIABLE): >60 ML/MIN/1.73 M^2
GLUCOSE SERPL-MCNC: 102 MG/DL (ref 70–110)
HCT VFR BLD AUTO: 41 % (ref 40–54)
HGB BLD-MCNC: 14 G/DL (ref 14–18)
IMM GRANULOCYTES # BLD AUTO: 0.04 K/UL (ref 0–0.04)
IMM GRANULOCYTES NFR BLD AUTO: 0.4 % (ref 0–0.5)
LYMPHOCYTES # BLD AUTO: 3.5 K/UL (ref 1–4.8)
LYMPHOCYTES NFR BLD: 32.3 % (ref 18–48)
MAGNESIUM SERPL-MCNC: 1.8 MG/DL (ref 1.6–2.6)
MCH RBC QN AUTO: 29.6 PG (ref 27–31)
MCHC RBC AUTO-ENTMCNC: 34.1 G/DL (ref 32–36)
MCV RBC AUTO: 87 FL (ref 82–98)
MONOCYTES # BLD AUTO: 0.9 K/UL (ref 0.3–1)
MONOCYTES NFR BLD: 8.5 % (ref 4–15)
NEUTROPHILS # BLD AUTO: 6.1 K/UL (ref 1.8–7.7)
NEUTROPHILS NFR BLD: 56.5 % (ref 38–73)
NRBC BLD-RTO: 0 /100 WBC
PHOSPHATE SERPL-MCNC: 4.5 MG/DL (ref 2.7–4.5)
PLATELET # BLD AUTO: 329 K/UL (ref 150–450)
PMV BLD AUTO: 9.4 FL (ref 9.2–12.9)
POTASSIUM SERPL-SCNC: 4 MMOL/L (ref 3.5–5.1)
RBC # BLD AUTO: 4.73 M/UL (ref 4.6–6.2)
SODIUM SERPL-SCNC: 139 MMOL/L (ref 136–145)
WBC # BLD AUTO: 10.84 K/UL (ref 3.9–12.7)

## 2023-01-04 PROCEDURE — 63600175 PHARM REV CODE 636 W HCPCS: Performed by: EMERGENCY MEDICINE

## 2023-01-04 PROCEDURE — 63600175 PHARM REV CODE 636 W HCPCS: Performed by: COLON & RECTAL SURGERY

## 2023-01-04 PROCEDURE — 21400001 HC TELEMETRY ROOM

## 2023-01-04 PROCEDURE — 85025 COMPLETE CBC W/AUTO DIFF WBC: CPT | Performed by: COLON & RECTAL SURGERY

## 2023-01-04 PROCEDURE — 99223 PR INITIAL HOSPITAL CARE,LEVL III: ICD-10-PCS | Mod: ,,, | Performed by: COLON & RECTAL SURGERY

## 2023-01-04 PROCEDURE — 84100 ASSAY OF PHOSPHORUS: CPT | Performed by: COLON & RECTAL SURGERY

## 2023-01-04 PROCEDURE — 36415 COLL VENOUS BLD VENIPUNCTURE: CPT | Performed by: COLON & RECTAL SURGERY

## 2023-01-04 PROCEDURE — 99223 1ST HOSP IP/OBS HIGH 75: CPT | Mod: ,,, | Performed by: COLON & RECTAL SURGERY

## 2023-01-04 PROCEDURE — 83735 ASSAY OF MAGNESIUM: CPT | Performed by: COLON & RECTAL SURGERY

## 2023-01-04 PROCEDURE — 80048 BASIC METABOLIC PNL TOTAL CA: CPT | Performed by: COLON & RECTAL SURGERY

## 2023-01-04 PROCEDURE — 25000003 PHARM REV CODE 250: Performed by: EMERGENCY MEDICINE

## 2023-01-04 PROCEDURE — 25000003 PHARM REV CODE 250: Performed by: COLON & RECTAL SURGERY

## 2023-01-04 RX ORDER — SODIUM CHLORIDE 9 MG/ML
INJECTION, SOLUTION INTRAVENOUS
Status: DISCONTINUED | OUTPATIENT
Start: 2023-01-04 | End: 2023-01-09 | Stop reason: HOSPADM

## 2023-01-04 RX ORDER — POLYETHYLENE GLYCOL 3350 17 G/17G
17 POWDER, FOR SOLUTION ORAL 2 TIMES DAILY
Status: DISCONTINUED | OUTPATIENT
Start: 2023-01-04 | End: 2023-01-09 | Stop reason: HOSPADM

## 2023-01-04 RX ADMIN — OXYCODONE HYDROCHLORIDE 10 MG: 5 TABLET ORAL at 06:01

## 2023-01-04 RX ADMIN — PIPERACILLIN SODIUM AND TAZOBACTAM SODIUM 4.5 G: 4; .5 INJECTION, POWDER, LYOPHILIZED, FOR SOLUTION INTRAVENOUS at 01:01

## 2023-01-04 RX ADMIN — SODIUM CHLORIDE: 9 INJECTION, SOLUTION INTRAVENOUS at 01:01

## 2023-01-04 RX ADMIN — PIPERACILLIN SODIUM AND TAZOBACTAM SODIUM 4.5 G: 4; .5 INJECTION, POWDER, LYOPHILIZED, FOR SOLUTION INTRAVENOUS at 08:01

## 2023-01-04 RX ADMIN — PIPERACILLIN SODIUM AND TAZOBACTAM SODIUM 4.5 G: 4; .5 INJECTION, POWDER, LYOPHILIZED, FOR SOLUTION INTRAVENOUS at 05:01

## 2023-01-04 RX ADMIN — OXYCODONE HYDROCHLORIDE 10 MG: 5 TABLET ORAL at 05:01

## 2023-01-04 RX ADMIN — POLYETHYLENE GLYCOL 3350 17 G: 17 POWDER, FOR SOLUTION ORAL at 08:01

## 2023-01-04 RX ADMIN — POLYETHYLENE GLYCOL 3350 17 G: 17 POWDER, FOR SOLUTION ORAL at 02:01

## 2023-01-04 RX ADMIN — PROMETHAZINE HYDROCHLORIDE 12.5 MG: 25 INJECTION INTRAMUSCULAR; INTRAVENOUS at 05:01

## 2023-01-04 RX ADMIN — OXYCODONE HYDROCHLORIDE 5 MG: 5 TABLET ORAL at 01:01

## 2023-01-04 NOTE — H&P
[facility])  Colorectal Surgery  History & Physical    Patient Name: David Sevilla  MRN: [id number]  Admission Date: 1/3/2023  Attending Physician: Prasad Alonso MD  Primary Care Provider: Prasad Alonso MD    Patient information was obtained from patient, past medical records and ER records.     Subjective:     Chief Complaint/Reason for Admission: Sigmoid Diverticulitis    History of Present Illness: 32 y.o. male presents with previous complicated diverticulitis requiring hospitalization in October 2022 treated nonoperatively who represents to the ED with a one day history of lower abdominal pain, mostly suprapubic. Patient has had intermittent abdominal pain since his original diagnosis in October that has been intermittent, as well as intermittently treated by ED with PO abx, most recently Augmentin on 12/20/22. Denies current fever, chills, nausea, vomiting, hematochezia, melena. Never had colonoscopy. No family history of CRC, polpys, IBD. No previous abdominal surgeries. Workup in ED positive for leukocytosis of 13k and CT scan concerning for sigmoid diverticulitis. CRS consulted for evaluation and admission.      No current facility-administered medications on file prior to encounter.     Current Outpatient Medications on File Prior to Encounter   Medication Sig    baclofen (LIORESAL) 20 MG tablet Take 20 mg by mouth 3 (three) times daily as needed.    HYDROcodone-acetaminophen (NORCO) 7.5-325 mg per tablet Take 1 tablet by mouth every 6 (six) hours as needed for Pain.    hyoscyamine (ANASPAZ,LEVSIN) 0.125 mg Tab Take 1 tablet (125 mcg total) by mouth every 4 (four) hours as needed (pain).    metoclopramide HCl (REGLAN) 10 MG tablet Take 1 tablet (10 mg total) by mouth every 6 (six) hours as needed (nausea/vomiting).       Review of patient's allergies indicates:   Allergen Reactions    Morphine Nausea And Vomiting and Other (See Comments)     migraine       Past  Medical History:   Diagnosis Date    Diverticulitis     Diverticulitis      No past surgical history on file.  Family History    None       Tobacco Use    Smoking status: Every Day     Types: Vaping with nicotine    Smokeless tobacco: Not on file   Substance and Sexual Activity    Alcohol use: Not on file    Drug use: Yes     Types: Marijuana    Sexual activity: Not on file     Review of Systems   Constitutional:  Negative for activity change, appetite change, chills, fatigue, fever and unexpected weight change.   HENT:  Negative for congestion, ear pain, sore throat and trouble swallowing.    Eyes:  Negative for pain, redness and itching.   Respiratory:  Negative for cough, shortness of breath and wheezing.    Cardiovascular:  Negative for chest pain, palpitations and leg swelling.   Gastrointestinal:  Positive for abdominal pain. Negative for abdominal distention, anal bleeding, blood in stool, nausea, rectal pain and vomiting.   Endocrine: Negative for cold intolerance, heat intolerance and polyuria.   Genitourinary:  Negative for dysuria, flank pain, frequency and hematuria.   Musculoskeletal:  Negative for gait problem, joint swelling and neck pain.   Skin:  Negative for color change, rash and wound.   Allergic/Immunologic: Negative for environmental allergies and immunocompromised state.   Neurological:  Negative for dizziness, speech difficulty, weakness and numbness.   Psychiatric/Behavioral:  Negative for agitation, confusion and hallucinations.    Objective:     Vital Signs (Most Recent):  Temp: 98.1 °F (36.7 °C) (01/03/23 2220)  Pulse: 83 (01/03/23 2220)  Resp: 16 (01/04/23 0531)  BP: 130/79 (01/03/23 2220)  SpO2: 97 % (01/03/23 2220) Vital Signs (24h Range):  Temp:  [98.1 °F (36.7 °C)-98.6 °F (37 °C)] 98.1 °F (36.7 °C)  Pulse:  [] 83  Resp:  [14-18] 16  SpO2:  [96 %-100 %] 97 %  BP: (111-141)/(56-85) 130/79     Weight: 93.2 kg (205 lb 9.3 oz)  Body mass index is 25.7 kg/m².    Physical  Exam  Constitutional:       Appearance: He is well-developed. He is ill-appearing.   HENT:      Head: Normocephalic and atraumatic.   Eyes:      Conjunctiva/sclera: Conjunctivae normal.   Neck:      Thyroid: No thyromegaly.   Cardiovascular:      Rate and Rhythm: Normal rate and regular rhythm.   Pulmonary:      Effort: Pulmonary effort is normal. No respiratory distress.   Abdominal:      Comments: Soft, nondistended, +TTP suprapubic>LLQ; no rebound or guarding; no previous incisions   Musculoskeletal:         General: No tenderness. Normal range of motion.      Cervical back: Normal range of motion.   Skin:     General: Skin is warm and dry.      Capillary Refill: Capillary refill takes less than 2 seconds.      Findings: No rash.   Neurological:      Mental Status: He is alert and oriented to person, place, and time.       Significant Labs:  I have reviewed all pertinent lab results within the past 24 hours.  CBC:   Recent Labs   Lab 01/04/23  0607   WBC 10.84   RBC 4.73   HGB 14.0   HCT 41.0      MCV 87   MCH 29.6   MCHC 34.1     BMP:   Recent Labs   Lab 01/04/23  0607         K 4.0      CO2 25   BUN 9   CREATININE 1.0   CALCIUM 9.8   MG 1.8     CMP:   Recent Labs   Lab 01/03/23  1152 01/04/23  0607   GLU 96 102   CALCIUM 10.5 9.8   ALBUMIN 4.5  --    PROT 8.0  --     139   K 4.7 4.0   CO2 22* 25    103   BUN 10 9   CREATININE 1.0 1.0   ALKPHOS 91  --    ALT 41  --    AST 19  --    BILITOT 0.7  --      LFTs:   Recent Labs   Lab 01/03/23  1152   ALT 41   AST 19   ALKPHOS 91   BILITOT 0.7   PROT 8.0   ALBUMIN 4.5       Significant Diagnostics:  I have reviewed all pertinent imaging results/findings within the past 24 hours.    CT:  FINDINGS:  Finding: The size of the heart is within normal limits.  There are several noncalcified pulmonary nodules in the visualized portion of the lungs.  One of the larger ones measures 5 mm and is located in the posterior aspect of right lower  lobe.  On the prior examination it measured 5 mm.  There is no pneumothorax or pleural effusion.     The liver, gallbladder, pancreas, spleen, adrenals, and kidneys are normal in appearance. The ureters and the urinary bladder are normal in appearance.  The prostate is normal in appearance.  The appendix is normal in appearance.  There are multiple diverticula in the descending and sigmoid portions of the colon.  There are moderate inflammatory changes adjacent to the sigmoid colon.  There is no free fluid within the abdomen or pelvis. There is no pneumoperitoneum.     Impression:     1. There are multiple diverticula in the descending and sigmoid portions of the colon.  There are moderate inflammatory changes adjacent to the sigmoid colon.  This is characteristic of diverticulitis of the sigmoid colon.  2. There are several noncalcified pulmonary nodules in the visualized portion of the lungs. One of the larger ones measures 5 mm and is located in the posterior aspect of right lower lobe.  On the prior examination it measured 5 mm.        Assessment/Plan:     Acute diverticulitis  33yo M with acute sigmoid diverticulitis    - Admit to surgery  - NPO  - IV abx  - IVF  - discussed with patient that he may be suffering from smoldering diverticulitis and may require surgical intervention given the frequency of attacks and multiple ED visits over past few months. He is in agreement with this and is willing to undergo surgery at this point as he is tired of repeatedly coming in for the same thing which is reasonable. I did discuss with him that intervention now would increase the chance of an open procedure and/or ostomy construction and he voiced understanding of this. Will attempt to cool down for another 24hrs prior to surgical intervention  - WOCN c/s for stoma marking     Depression  Stable, monitor, continue appropriate home meds      VTE Risk Mitigation (From admission, onward)         Ordered     IP VTE LOW RISK  PATIENT  Once         01/03/23 2006     Place DOMINIQUE hose  Until discontinued         01/03/23 2006                Prasad Alonso MD  Colorectal Surgery  [facility] - Telemetry ([facility])

## 2023-01-04 NOTE — PLAN OF CARE
[facility] - Telemetry (Hospital)  Initial Discharge Assessment       Primary Care Provider: Prasad Alonso MD    Admission Diagnosis: Diverticulitis [K57.92]    Admission Date: 1/3/2023  Expected Discharge Date:     Discharge Barriers Identified: None    Payor: MEDICAID / Plan: [place] / Product Type: Managed Medicaid /     Extended Emergency Contact Information  Primary Emergency Contact: Ingrid Mclaughlin  Mobile Phone: [phone]  Relation: Sister   needed? No  Secondary Emergency Contact: Chris Mclaughlin  Mobile Phone: [phone]  Relation: Brother   needed? No    Discharge Plan A: Home  Discharge Plan B: Home Health      [facility] [address] [place] [id number]  Phone: [phone] Fax: [phone]      Initial Assessment (most recent)       Adult Discharge Assessment - 01/04/23 1157          Discharge Assessment    Assessment Type Discharge Planning Assessment     Confirmed/corrected address, phone number and insurance Yes     Confirmed Demographics Correct on Facesheet     Source of Information patient     Communicated NORBERTO with patient/caregiver Yes     Reason For Admission abd pain-diverticulitis     People in Home sibling(s)     Facility Arrived From: home     Do you expect to return to your current living situation? Yes     Do you have help at home or someone to help you manage your care at home? Yes     Who are your caregiver(s) and their phone number(s)? sister     Prior to hospitilization cognitive status: Alert/Oriented     Current cognitive status: Alert/Oriented     Walking or Climbing Stairs --   Indp    Dressing/Bathing --   Indp    Home Accessibility stairs to enter home     Number of Stairs, Main Entrance ten     Surface of Stairs, Main Entrance concrete     Stair Railings, Main Entrance railings safe and in good condition     Landing, Stairs, Main Entrance adequate turning radius      Stairs Comment, Main Entrance Lives on second floor apartment     Equipment Currently Used at Home none     Readmission within 30 days? No     Patient currently being followed by outpatient case management? No     Do you currently have service(s) that help you manage your care at home? No     Do you take prescription medications? Yes     Do you have prescription coverage? Yes     Do you have any problems affording any of your prescribed medications? No     Is the patient taking medications as prescribed? yes     Who is going to help you get home at discharge? sister     How do you get to doctors appointments? car, drives self     Are you on dialysis? No     Do you take coumadin? No     Discharge Plan A Home     Discharge Plan B Home Health     DME Needed Upon Discharge  none     Discharge Plan discussed with: Patient     Discharge Barriers Identified None                   Anticipated DC dispo: home vs home w/ HH  Prior Level of Function: Indp  PCP: Celeste    Comments:  CM met with patient at bedside to introduce role and discuss discharge planning. Patient lives with sister  who will also be help at home and can provide transport at time of discharge. CM discharge needs depends on hospital progress. CM will continue following to assist with other needs.      Pt schedule for colon resection tomorrow. DC Dispo pending if colostomy is outcome of sx. If new ostomy may need HH upon DC home. CM will cont to follow

## 2023-01-04 NOTE — ASSESSMENT & PLAN NOTE
31yo M with acute sigmoid diverticulitis    - Admit to surgery  - NPO  - IV abx  - IVF  - discussed with patient that he may be suffering from smoldering diverticulitis and may require surgical intervention given the frequency of attacks and multiple ED visits over past few months. He is in agreement with this and is willing to undergo surgery at this point as he is tired of repeatedly coming in for the same thing which is reasonable. I did discuss with him that intervention now would increase the chance of an open procedure and/or ostomy construction and he voiced understanding of this. Will attempt to cool down for another 24hrs prior to surgical intervention  - WOCN c/s for stoma marking

## 2023-01-04 NOTE — ED NOTES
Pt AAOx4, resting in bed, pt reports improvement in pain at this time, NSR on monitor, NAD, side rails up x 2, call bell within reach. Will continue to monitor.

## 2023-01-04 NOTE — SUBJECTIVE & OBJECTIVE
No current facility-administered medications on file prior to encounter.     Current Outpatient Medications on File Prior to Encounter   Medication Sig    baclofen (LIORESAL) 20 MG tablet Take 20 mg by mouth 3 (three) times daily as needed.    HYDROcodone-acetaminophen (NORCO) 7.5-325 mg per tablet Take 1 tablet by mouth every 6 (six) hours as needed for Pain.    hyoscyamine (ANASPAZ,LEVSIN) 0.125 mg Tab Take 1 tablet (125 mcg total) by mouth every 4 (four) hours as needed (pain).    metoclopramide HCl (REGLAN) 10 MG tablet Take 1 tablet (10 mg total) by mouth every 6 (six) hours as needed (nausea/vomiting).       Review of patient's allergies indicates:   Allergen Reactions    Morphine Nausea And Vomiting and Other (See Comments)     migraine       Past Medical History:   Diagnosis Date    Diverticulitis     Diverticulitis      No past surgical history on file.  Family History    None       Tobacco Use    Smoking status: Every Day     Types: Vaping with nicotine    Smokeless tobacco: Not on file   Substance and Sexual Activity    Alcohol use: Not on file    Drug use: Yes     Types: Marijuana    Sexual activity: Not on file     Review of Systems   Constitutional:  Negative for activity change, appetite change, chills, fatigue, fever and unexpected weight change.   HENT:  Negative for congestion, ear pain, sore throat and trouble swallowing.    Eyes:  Negative for pain, redness and itching.   Respiratory:  Negative for cough, shortness of breath and wheezing.    Cardiovascular:  Negative for chest pain, palpitations and leg swelling.   Gastrointestinal:  Positive for abdominal pain. Negative for abdominal distention, anal bleeding, blood in stool, nausea, rectal pain and vomiting.   Endocrine: Negative for cold intolerance, heat intolerance and polyuria.   Genitourinary:  Negative for dysuria, flank pain, frequency and hematuria.   Musculoskeletal:  Negative for gait problem, joint swelling and neck pain.   Skin:   Negative for color change, rash and wound.   Allergic/Immunologic: Negative for environmental allergies and immunocompromised state.   Neurological:  Negative for dizziness, speech difficulty, weakness and numbness.   Psychiatric/Behavioral:  Negative for agitation, confusion and hallucinations.    Objective:     Vital Signs (Most Recent):  Temp: 98.1 °F (36.7 °C) (01/03/23 2220)  Pulse: 83 (01/03/23 2220)  Resp: 16 (01/04/23 0531)  BP: 130/79 (01/03/23 2220)  SpO2: 97 % (01/03/23 2220) Vital Signs (24h Range):  Temp:  [98.1 °F (36.7 °C)-98.6 °F (37 °C)] 98.1 °F (36.7 °C)  Pulse:  [] 83  Resp:  [14-18] 16  SpO2:  [96 %-100 %] 97 %  BP: (111-141)/(56-85) 130/79     Weight: 93.2 kg (205 lb 9.3 oz)  Body mass index is 25.7 kg/m².    Physical Exam  Constitutional:       Appearance: He is well-developed. He is ill-appearing.   HENT:      Head: Normocephalic and atraumatic.   Eyes:      Conjunctiva/sclera: Conjunctivae normal.   Neck:      Thyroid: No thyromegaly.   Cardiovascular:      Rate and Rhythm: Normal rate and regular rhythm.   Pulmonary:      Effort: Pulmonary effort is normal. No respiratory distress.   Abdominal:      Comments: Soft, nondistended, +TTP suprapubic>LLQ; no rebound or guarding; no previous incisions   Musculoskeletal:         General: No tenderness. Normal range of motion.      Cervical back: Normal range of motion.   Skin:     General: Skin is warm and dry.      Capillary Refill: Capillary refill takes less than 2 seconds.      Findings: No rash.   Neurological:      Mental Status: He is alert and oriented to person, place, and time.       Significant Labs:  I have reviewed all pertinent lab results within the past 24 hours.  CBC:   Recent Labs   Lab 01/04/23  0607   WBC 10.84   RBC 4.73   HGB 14.0   HCT 41.0      MCV 87   MCH 29.6   MCHC 34.1     BMP:   Recent Labs   Lab 01/04/23  0607         K 4.0      CO2 25   BUN 9   CREATININE 1.0   CALCIUM 9.8   MG 1.8      CMP:   Recent Labs   Lab 01/03/23  1152 01/04/23  0607   GLU 96 102   CALCIUM 10.5 9.8   ALBUMIN 4.5  --    PROT 8.0  --     139   K 4.7 4.0   CO2 22* 25    103   BUN 10 9   CREATININE 1.0 1.0   ALKPHOS 91  --    ALT 41  --    AST 19  --    BILITOT 0.7  --      LFTs:   Recent Labs   Lab 01/03/23  1152   ALT 41   AST 19   ALKPHOS 91   BILITOT 0.7   PROT 8.0   ALBUMIN 4.5       Significant Diagnostics:  I have reviewed all pertinent imaging results/findings within the past 24 hours.    CT:  FINDINGS:  Finding: The size of the heart is within normal limits.  There are several noncalcified pulmonary nodules in the visualized portion of the lungs.  One of the larger ones measures 5 mm and is located in the posterior aspect of right lower lobe.  On the prior examination it measured 5 mm.  There is no pneumothorax or pleural effusion.     The liver, gallbladder, pancreas, spleen, adrenals, and kidneys are normal in appearance. The ureters and the urinary bladder are normal in appearance.  The prostate is normal in appearance.  The appendix is normal in appearance.  There are multiple diverticula in the descending and sigmoid portions of the colon.  There are moderate inflammatory changes adjacent to the sigmoid colon.  There is no free fluid within the abdomen or pelvis. There is no pneumoperitoneum.     Impression:     1. There are multiple diverticula in the descending and sigmoid portions of the colon.  There are moderate inflammatory changes adjacent to the sigmoid colon.  This is characteristic of diverticulitis of the sigmoid colon.  2. There are several noncalcified pulmonary nodules in the visualized portion of the lungs. One of the larger ones measures 5 mm and is located in the posterior aspect of right lower lobe.  On the prior examination it measured 5 mm.

## 2023-01-04 NOTE — HPI
32 y.o. male presents with previous complicated diverticulitis requiring hospitalization in October 2022 treated nonoperatively who represents to the ED with a one day history of lower abdominal pain, mostly suprapubic. Patient has had intermittent abdominal pain since his original diagnosis in October that has been intermittent, as well as intermittently treated by ED with PO abx, most recently Augmentin on 12/20/22. Denies current fever, chills, nausea, vomiting, hematochezia, melena. Never had colonoscopy. No family history of CRC, polpys, IBD. No previous abdominal surgeries. Workup in ED positive for leukocytosis of 13k and CT scan concerning for sigmoid diverticulitis. CRS consulted for evaluation and admission.

## 2023-01-04 NOTE — PHARMACY MED REC
"Admission Medication History     The home medication history was taken by Miguel Angel Willson.    You may go to "Admission" then "Reconcile Home Medications" tabs to review and/or act upon these items.     The home medication list has been updated by the Pharmacy department.   Please read ALL comments highlighted in yellow.   Please address this information as you see fit.    Feel free to contact us if you have any questions or require assistance.      The medications listed below were removed from the home medication list. Please reorder if appropriate:  Patient reports no longer taking the following medication(s):  BUPROPION  MG TABLET  ESCITALOPRAM 10 MG TABLET  GABAPENTIN 300 MG CAPSULE  PRAZOSIN 2 MG CAPSULE  Doesn't currently have a provider to refill these medications. Hasn't taken in a while.    Medications listed below were obtained from: Patient/family, [employer], and Patient's pharmacy  (Not in a hospital admission)      Potential issues to be addressed PRIOR TO DISCHARGE  Patient requested refills for the following medications: (Bupropion  mg tablet; Escitalopram 10 mg tablet; Gabapentin 300 mg capsule; Prazosin 2 mg capsule.)    Miguel Angel Willson CPhT-Adv  Pharmacy Technician Specialist-Medication History  [facility] [phone]  Secure chat preferred     Current Outpatient Medications on File Prior to Encounter   Medication Sig Dispense Refill Last Dose    baclofen (LIORESAL) 20 MG tablet Take 20 mg by mouth 3 (three) times daily as needed.   Past Week    HYDROcodone-acetaminophen (NORCO) 7.5-325 mg per tablet Take 1 tablet by mouth every 6 (six) hours as needed for Pain. 15 tablet 0 1/2/2023    hyoscyamine (ANASPAZ,LEVSIN) 0.125 mg Tab Take 1 tablet (125 mcg total) by mouth every 4 (four) hours as needed (pain). 20 tablet 0 Past Month at a couple of weeks    metoclopramide HCl (REGLAN) 10 MG tablet Take 1 tablet (10 mg total) by mouth every 6 (six) hours as needed " (nausea/vomiting). 30 tablet 0 Past Month at a couple of weeks    [DISCONTINUED] buPROPion (WELLBUTRIN XL) 150 MG TB24 tablet Take 150 mg by mouth every morning.       [DISCONTINUED] EScitalopram oxalate (LEXAPRO) 10 MG tablet Take 10 mg by mouth every evening.       [DISCONTINUED] gabapentin (NEURONTIN) 300 MG capsule Take 300 mg by mouth 3 (three) times daily.       [DISCONTINUED] prazosin (MINIPRESS) 2 MG Cap Take 2 mg by mouth every evening.                              .

## 2023-01-05 ENCOUNTER — ANESTHESIA (OUTPATIENT)
Dept: SURGERY | Facility: HOSPITAL | Age: 33
DRG: 331 | End: 2023-01-05
Payer: MEDICAID

## 2023-01-05 ENCOUNTER — ANESTHESIA EVENT (OUTPATIENT)
Dept: SURGERY | Facility: HOSPITAL | Age: 33
DRG: 331 | End: 2023-01-05
Payer: MEDICAID

## 2023-01-05 LAB
ANION GAP SERPL CALC-SCNC: 12 MMOL/L (ref 8–16)
BASOPHILS # BLD AUTO: 0.06 K/UL (ref 0–0.2)
BASOPHILS NFR BLD: 0.6 % (ref 0–1.9)
BUN SERPL-MCNC: 9 MG/DL (ref 6–20)
CALCIUM SERPL-MCNC: 9.8 MG/DL (ref 8.7–10.5)
CHLORIDE SERPL-SCNC: 101 MMOL/L (ref 95–110)
CO2 SERPL-SCNC: 26 MMOL/L (ref 23–29)
CREAT SERPL-MCNC: 1.1 MG/DL (ref 0.5–1.4)
DIFFERENTIAL METHOD: NORMAL
EOSINOPHIL # BLD AUTO: 0.3 K/UL (ref 0–0.5)
EOSINOPHIL NFR BLD: 3 % (ref 0–8)
ERYTHROCYTE [DISTWIDTH] IN BLOOD BY AUTOMATED COUNT: 11.9 % (ref 11.5–14.5)
EST. GFR  (NO RACE VARIABLE): >60 ML/MIN/1.73 M^2
GLUCOSE SERPL-MCNC: 84 MG/DL (ref 70–110)
HCT VFR BLD AUTO: 41.1 % (ref 40–54)
HGB BLD-MCNC: 14.1 G/DL (ref 14–18)
IMM GRANULOCYTES # BLD AUTO: 0.02 K/UL (ref 0–0.04)
IMM GRANULOCYTES NFR BLD AUTO: 0.2 % (ref 0–0.5)
LYMPHOCYTES # BLD AUTO: 4.3 K/UL (ref 1–4.8)
LYMPHOCYTES NFR BLD: 45.8 % (ref 18–48)
MAGNESIUM SERPL-MCNC: 1.9 MG/DL (ref 1.6–2.6)
MCH RBC QN AUTO: 29.3 PG (ref 27–31)
MCHC RBC AUTO-ENTMCNC: 34.3 G/DL (ref 32–36)
MCV RBC AUTO: 85 FL (ref 82–98)
MONOCYTES # BLD AUTO: 0.7 K/UL (ref 0.3–1)
MONOCYTES NFR BLD: 7.4 % (ref 4–15)
NEUTROPHILS # BLD AUTO: 4 K/UL (ref 1.8–7.7)
NEUTROPHILS NFR BLD: 43 % (ref 38–73)
NRBC BLD-RTO: 0 /100 WBC
PHOSPHATE SERPL-MCNC: 4.5 MG/DL (ref 2.7–4.5)
PLATELET # BLD AUTO: 359 K/UL (ref 150–450)
PMV BLD AUTO: 9.7 FL (ref 9.2–12.9)
POTASSIUM SERPL-SCNC: 4.2 MMOL/L (ref 3.5–5.1)
RBC # BLD AUTO: 4.82 M/UL (ref 4.6–6.2)
SODIUM SERPL-SCNC: 139 MMOL/L (ref 136–145)
WBC # BLD AUTO: 9.41 K/UL (ref 3.9–12.7)

## 2023-01-05 PROCEDURE — 63600175 PHARM REV CODE 636 W HCPCS: Performed by: COLON & RECTAL SURGERY

## 2023-01-05 PROCEDURE — 88305 TISSUE EXAM BY PATHOLOGIST: CPT | Mod: 26,,, | Performed by: PATHOLOGY

## 2023-01-05 PROCEDURE — 37000008 HC ANESTHESIA 1ST 15 MINUTES: Performed by: COLON & RECTAL SURGERY

## 2023-01-05 PROCEDURE — 25000003 PHARM REV CODE 250: Performed by: COLON & RECTAL SURGERY

## 2023-01-05 PROCEDURE — 44204 PR LAP,SURG,COLECTOMY, PARTIAL, W/ANAST: ICD-10-PCS | Mod: AS,,,

## 2023-01-05 PROCEDURE — 85025 COMPLETE CBC W/AUTO DIFF WBC: CPT | Performed by: COLON & RECTAL SURGERY

## 2023-01-05 PROCEDURE — 99232 SBSQ HOSP IP/OBS MODERATE 35: CPT | Mod: 57,,, | Performed by: COLON & RECTAL SURGERY

## 2023-01-05 PROCEDURE — 36000710: Performed by: COLON & RECTAL SURGERY

## 2023-01-05 PROCEDURE — 88305 TISSUE EXAM BY PATHOLOGIST: CPT | Performed by: PATHOLOGY

## 2023-01-05 PROCEDURE — 63600175 PHARM REV CODE 636 W HCPCS: Performed by: STUDENT IN AN ORGANIZED HEALTH CARE EDUCATION/TRAINING PROGRAM

## 2023-01-05 PROCEDURE — 80048 BASIC METABOLIC PNL TOTAL CA: CPT | Performed by: COLON & RECTAL SURGERY

## 2023-01-05 PROCEDURE — 84100 ASSAY OF PHOSPHORUS: CPT | Performed by: COLON & RECTAL SURGERY

## 2023-01-05 PROCEDURE — 37000009 HC ANESTHESIA EA ADD 15 MINS: Performed by: COLON & RECTAL SURGERY

## 2023-01-05 PROCEDURE — 63600175 PHARM REV CODE 636 W HCPCS: Performed by: ANESTHESIOLOGY

## 2023-01-05 PROCEDURE — 71000039 HC RECOVERY, EACH ADD'L HOUR: Performed by: COLON & RECTAL SURGERY

## 2023-01-05 PROCEDURE — 36415 COLL VENOUS BLD VENIPUNCTURE: CPT | Performed by: COLON & RECTAL SURGERY

## 2023-01-05 PROCEDURE — 27000221 HC OXYGEN, UP TO 24 HOURS

## 2023-01-05 PROCEDURE — C9290 INJ, BUPIVACAINE LIPOSOME: HCPCS | Performed by: COLON & RECTAL SURGERY

## 2023-01-05 PROCEDURE — 44204 LAPARO PARTIAL COLECTOMY: CPT | Mod: AS,,,

## 2023-01-05 PROCEDURE — 88305 TISSUE EXAM BY PATHOLOGIST: ICD-10-PCS | Mod: 26,,, | Performed by: PATHOLOGY

## 2023-01-05 PROCEDURE — 71000033 HC RECOVERY, INTIAL HOUR: Performed by: COLON & RECTAL SURGERY

## 2023-01-05 PROCEDURE — 88307 PR  SURG PATH,LEVEL V: ICD-10-PCS | Mod: 26,,, | Performed by: PATHOLOGY

## 2023-01-05 PROCEDURE — 36000711: Performed by: COLON & RECTAL SURGERY

## 2023-01-05 PROCEDURE — 88307 TISSUE EXAM BY PATHOLOGIST: CPT | Performed by: PATHOLOGY

## 2023-01-05 PROCEDURE — 44204 PR LAP,SURG,COLECTOMY, PARTIAL, W/ANAST: ICD-10-PCS | Mod: ,,, | Performed by: COLON & RECTAL SURGERY

## 2023-01-05 PROCEDURE — 99232 PR SUBSEQUENT HOSPITAL CARE,LEVL II: ICD-10-PCS | Mod: 57,,, | Performed by: COLON & RECTAL SURGERY

## 2023-01-05 PROCEDURE — 44213 LAP MOBIL SPLENIC FL ADD-ON: CPT | Mod: ,,, | Performed by: COLON & RECTAL SURGERY

## 2023-01-05 PROCEDURE — 25000003 PHARM REV CODE 250: Performed by: STUDENT IN AN ORGANIZED HEALTH CARE EDUCATION/TRAINING PROGRAM

## 2023-01-05 PROCEDURE — 88307 TISSUE EXAM BY PATHOLOGIST: CPT | Mod: 26,,, | Performed by: PATHOLOGY

## 2023-01-05 PROCEDURE — 44213 LAP MOBIL SPLENIC FL ADD-ON: CPT | Mod: AS,,,

## 2023-01-05 PROCEDURE — 83735 ASSAY OF MAGNESIUM: CPT | Performed by: COLON & RECTAL SURGERY

## 2023-01-05 PROCEDURE — 99900035 HC TECH TIME PER 15 MIN (STAT)

## 2023-01-05 PROCEDURE — 44213 PR LAP, SURG MOBIL SPLENIC FL DUR PTL COLECTOMY: ICD-10-PCS | Mod: AS,,,

## 2023-01-05 PROCEDURE — 44204 LAPARO PARTIAL COLECTOMY: CPT | Mod: ,,, | Performed by: COLON & RECTAL SURGERY

## 2023-01-05 PROCEDURE — 27201423 OPTIME MED/SURG SUP & DEVICES STERILE SUPPLY: Performed by: COLON & RECTAL SURGERY

## 2023-01-05 PROCEDURE — 94761 N-INVAS EAR/PLS OXIMETRY MLT: CPT

## 2023-01-05 PROCEDURE — 44213 PR LAP, SURG MOBIL SPLENIC FL DUR PTL COLECTOMY: ICD-10-PCS | Mod: ,,, | Performed by: COLON & RECTAL SURGERY

## 2023-01-05 PROCEDURE — 21400001 HC TELEMETRY ROOM

## 2023-01-05 RX ORDER — ONDANSETRON 2 MG/ML
4 INJECTION INTRAMUSCULAR; INTRAVENOUS EVERY 6 HOURS PRN
Status: DISCONTINUED | OUTPATIENT
Start: 2023-01-05 | End: 2023-01-09 | Stop reason: HOSPADM

## 2023-01-05 RX ORDER — NEOSTIGMINE METHYLSULFATE 1 MG/ML
INJECTION, SOLUTION INTRAVENOUS
Status: DISCONTINUED | OUTPATIENT
Start: 2023-01-05 | End: 2023-01-05

## 2023-01-05 RX ORDER — ROCURONIUM BROMIDE 10 MG/ML
INJECTION, SOLUTION INTRAVENOUS
Status: DISCONTINUED | OUTPATIENT
Start: 2023-01-05 | End: 2023-01-05

## 2023-01-05 RX ORDER — KETAMINE HCL IN 0.9 % NACL 50 MG/5 ML
SYRINGE (ML) INTRAVENOUS
Status: DISCONTINUED | OUTPATIENT
Start: 2023-01-05 | End: 2023-01-05

## 2023-01-05 RX ORDER — LIDOCAINE HYDROCHLORIDE 10 MG/ML
INJECTION, SOLUTION EPIDURAL; INFILTRATION; INTRACAUDAL; PERINEURAL
Status: DISCONTINUED | OUTPATIENT
Start: 2023-01-05 | End: 2023-01-05

## 2023-01-05 RX ORDER — HYDROMORPHONE HYDROCHLORIDE 2 MG/ML
0.2 INJECTION, SOLUTION INTRAMUSCULAR; INTRAVENOUS; SUBCUTANEOUS EVERY 5 MIN PRN
Status: DISCONTINUED | OUTPATIENT
Start: 2023-01-05 | End: 2023-01-05 | Stop reason: HOSPADM

## 2023-01-05 RX ORDER — ACETAMINOPHEN 10 MG/ML
INJECTION, SOLUTION INTRAVENOUS
Status: DISCONTINUED | OUTPATIENT
Start: 2023-01-05 | End: 2023-01-05

## 2023-01-05 RX ORDER — ONDANSETRON 2 MG/ML
INJECTION INTRAMUSCULAR; INTRAVENOUS
Status: DISCONTINUED | OUTPATIENT
Start: 2023-01-05 | End: 2023-01-05

## 2023-01-05 RX ORDER — FENTANYL CITRATE 50 UG/ML
INJECTION, SOLUTION INTRAMUSCULAR; INTRAVENOUS
Status: DISCONTINUED | OUTPATIENT
Start: 2023-01-05 | End: 2023-01-05

## 2023-01-05 RX ORDER — BUPIVACAINE HYDROCHLORIDE 2.5 MG/ML
INJECTION, SOLUTION EPIDURAL; INFILTRATION; INTRACAUDAL
Status: DISCONTINUED | OUTPATIENT
Start: 2023-01-05 | End: 2023-01-05 | Stop reason: HOSPADM

## 2023-01-05 RX ORDER — PROPOFOL 10 MG/ML
VIAL (ML) INTRAVENOUS
Status: DISCONTINUED | OUTPATIENT
Start: 2023-01-05 | End: 2023-01-05

## 2023-01-05 RX ORDER — SUCCINYLCHOLINE CHLORIDE 20 MG/ML
INJECTION INTRAMUSCULAR; INTRAVENOUS
Status: DISCONTINUED | OUTPATIENT
Start: 2023-01-05 | End: 2023-01-05

## 2023-01-05 RX ORDER — DEXAMETHASONE SODIUM PHOSPHATE 4 MG/ML
INJECTION, SOLUTION INTRA-ARTICULAR; INTRALESIONAL; INTRAMUSCULAR; INTRAVENOUS; SOFT TISSUE
Status: DISCONTINUED | OUTPATIENT
Start: 2023-01-05 | End: 2023-01-05

## 2023-01-05 RX ORDER — MIDAZOLAM HYDROCHLORIDE 1 MG/ML
INJECTION, SOLUTION INTRAMUSCULAR; INTRAVENOUS
Status: DISCONTINUED | OUTPATIENT
Start: 2023-01-05 | End: 2023-01-05

## 2023-01-05 RX ORDER — ONDANSETRON 2 MG/ML
4 INJECTION INTRAMUSCULAR; INTRAVENOUS DAILY PRN
Status: DISCONTINUED | OUTPATIENT
Start: 2023-01-05 | End: 2023-01-05 | Stop reason: HOSPADM

## 2023-01-05 RX ORDER — KETOROLAC TROMETHAMINE 30 MG/ML
15 INJECTION, SOLUTION INTRAMUSCULAR; INTRAVENOUS EVERY 8 HOURS PRN
Status: DISCONTINUED | OUTPATIENT
Start: 2023-01-05 | End: 2023-01-05 | Stop reason: HOSPADM

## 2023-01-05 RX ORDER — OXYCODONE AND ACETAMINOPHEN 5; 325 MG/1; MG/1
1 TABLET ORAL
Status: DISCONTINUED | OUTPATIENT
Start: 2023-01-05 | End: 2023-01-05 | Stop reason: HOSPADM

## 2023-01-05 RX ADMIN — OXYCODONE HYDROCHLORIDE 10 MG: 5 TABLET ORAL at 11:01

## 2023-01-05 RX ADMIN — ONDANSETRON 4 MG: 2 INJECTION, SOLUTION INTRAMUSCULAR; INTRAVENOUS at 11:01

## 2023-01-05 RX ADMIN — DEXAMETHASONE SODIUM PHOSPHATE 4 MG: 4 INJECTION, SOLUTION INTRAMUSCULAR; INTRAVENOUS at 09:01

## 2023-01-05 RX ADMIN — GLYCOPYRROLATE 0.8 MG: 0.2 INJECTION, SOLUTION INTRAMUSCULAR; INTRAVENOUS at 12:01

## 2023-01-05 RX ADMIN — ROCURONIUM BROMIDE 10 MG: 10 INJECTION, SOLUTION INTRAVENOUS at 10:01

## 2023-01-05 RX ADMIN — ONDANSETRON 4 MG: 2 INJECTION INTRAMUSCULAR; INTRAVENOUS at 02:01

## 2023-01-05 RX ADMIN — SODIUM CHLORIDE, POTASSIUM CHLORIDE, SODIUM LACTATE AND CALCIUM CHLORIDE: 600; 310; 30; 20 INJECTION, SOLUTION INTRAVENOUS at 05:01

## 2023-01-05 RX ADMIN — PROMETHAZINE HYDROCHLORIDE 12.5 MG: 25 INJECTION INTRAMUSCULAR; INTRAVENOUS at 01:01

## 2023-01-05 RX ADMIN — ACETAMINOPHEN 1000 MG: 10 INJECTION, SOLUTION INTRAVENOUS at 09:01

## 2023-01-05 RX ADMIN — HYDROMORPHONE HYDROCHLORIDE 0.2 MG: 2 INJECTION INTRAMUSCULAR; INTRAVENOUS; SUBCUTANEOUS at 02:01

## 2023-01-05 RX ADMIN — SODIUM CHLORIDE, SODIUM LACTATE, POTASSIUM CHLORIDE, AND CALCIUM CHLORIDE: .6; .31; .03; .02 INJECTION, SOLUTION INTRAVENOUS at 11:01

## 2023-01-05 RX ADMIN — ROCURONIUM BROMIDE 10 MG: 10 INJECTION, SOLUTION INTRAVENOUS at 11:01

## 2023-01-05 RX ADMIN — NEOSTIGMINE METHYLSULFATE 5 MG: 1 INJECTION INTRAVENOUS at 12:01

## 2023-01-05 RX ADMIN — ROCURONIUM BROMIDE 20 MG: 10 INJECTION, SOLUTION INTRAVENOUS at 09:01

## 2023-01-05 RX ADMIN — SODIUM CHLORIDE, SODIUM LACTATE, POTASSIUM CHLORIDE, AND CALCIUM CHLORIDE: .6; .31; .03; .02 INJECTION, SOLUTION INTRAVENOUS at 09:01

## 2023-01-05 RX ADMIN — Medication 25 MG: at 09:01

## 2023-01-05 RX ADMIN — HYDROMORPHONE HYDROCHLORIDE 0.5 MG: 1 INJECTION, SOLUTION INTRAMUSCULAR; INTRAVENOUS; SUBCUTANEOUS at 08:01

## 2023-01-05 RX ADMIN — PIPERACILLIN SODIUM AND TAZOBACTAM SODIUM 4.5 G: 4; .5 INJECTION, POWDER, LYOPHILIZED, FOR SOLUTION INTRAVENOUS at 05:01

## 2023-01-05 RX ADMIN — ROCURONIUM BROMIDE 25 MG: 10 INJECTION, SOLUTION INTRAVENOUS at 09:01

## 2023-01-05 RX ADMIN — OXYCODONE HYDROCHLORIDE 10 MG: 5 TABLET ORAL at 07:01

## 2023-01-05 RX ADMIN — OXYCODONE HYDROCHLORIDE 10 MG: 5 TABLET ORAL at 01:01

## 2023-01-05 RX ADMIN — PIPERACILLIN SODIUM AND TAZOBACTAM SODIUM 4.5 G: 4; .5 INJECTION, POWDER, LYOPHILIZED, FOR SOLUTION INTRAVENOUS at 02:01

## 2023-01-05 RX ADMIN — LIDOCAINE HYDROCHLORIDE 100 MG: 10 INJECTION, SOLUTION EPIDURAL; INFILTRATION; INTRACAUDAL; PERINEURAL at 09:01

## 2023-01-05 RX ADMIN — ROCURONIUM BROMIDE 5 MG: 10 INJECTION, SOLUTION INTRAVENOUS at 09:01

## 2023-01-05 RX ADMIN — OXYCODONE HYDROCHLORIDE 10 MG: 5 TABLET ORAL at 06:01

## 2023-01-05 RX ADMIN — PIPERACILLIN SODIUM AND TAZOBACTAM SODIUM 4.5 G: 4; .5 INJECTION, POWDER, LYOPHILIZED, FOR SOLUTION INTRAVENOUS at 09:01

## 2023-01-05 RX ADMIN — MIDAZOLAM 2 MG: 1 INJECTION INTRAMUSCULAR; INTRAVENOUS at 09:01

## 2023-01-05 RX ADMIN — SUCCINYLCHOLINE CHLORIDE 120 MG: 20 INJECTION, SOLUTION INTRAMUSCULAR; INTRAVENOUS at 09:01

## 2023-01-05 RX ADMIN — PROPOFOL 150 MG: 10 INJECTION, EMULSION INTRAVENOUS at 09:01

## 2023-01-05 RX ADMIN — PROPOFOL 50 MG: 10 INJECTION, EMULSION INTRAVENOUS at 11:01

## 2023-01-05 RX ADMIN — ROCURONIUM BROMIDE 20 MG: 10 INJECTION, SOLUTION INTRAVENOUS at 11:01

## 2023-01-05 RX ADMIN — FENTANYL CITRATE 100 MCG: 50 INJECTION, SOLUTION INTRAMUSCULAR; INTRAVENOUS at 09:01

## 2023-01-05 NOTE — PLAN OF CARE
POC reviewed with pt. Pt verbalizes understanding of POC. No questions at this time.  AAOx4.   Patient not on telemetry monitoring.  Pt remains free of falls. Independent with ADLs.  Medications given as ordered.  PRN pain medication given per request.  No complaints or distress noted @ this time.  Safety measures in place. Will continue to monitor.  Informed pt to call for assistance before getting up. Pt verbalizes understanding.   Hourly rounding complete.

## 2023-01-05 NOTE — NURSING
"AAOx4  NAD  VSS    /64 (Patient Position: Lying)   Pulse 84   Temp 98.1 °F (36.7 °C) (Oral)   Resp 17   Ht 6' 3" (1.905 m)   Wt 93.2 kg (205 lb 7.5 oz)   SpO2 97%   BMI 25.68 kg/m²     NPO except for sips of water rt possible surgery tomorrow. POC reveiwed with pt who verbalizes understanding.    Pt sitting up in bed. Bed in low & locked position with call light in reach. Will continue to monitor.    "

## 2023-01-05 NOTE — SUBJECTIVE & OBJECTIVE
Interval History: No acute events overnight. Still with suprapubic pain. Ready for OR today.    Medications:  Continuous Infusions:   lactated ringers 100 mL/hr at 01/05/23 0504     Scheduled Meds:   piperacillin-tazobactam (ZOSYN) IVPB  4.5 g Intravenous Q8H    polyethylene glycol  17 g Oral BID     PRN Meds:sodium chloride 0.9%, sodium chloride 0.9%, acetaminophen, BUPivacaine (PF) 0.25% (2.5 mg/ml), BUPivacaine liposome (PF), HYDROmorphone, LIDOcaine (PF) 10 mg/ml (1%), melatonin, oxyCODONE, oxyCODONE, promethazine (PHENERGAN) IVPB, sodium chloride 0.9%     Review of patient's allergies indicates:   Allergen Reactions    Morphine Nausea And Vomiting and Other (See Comments)     migraine     Objective:     Vital Signs (Most Recent):  Temp: 97.8 °F (36.6 °C) (01/05/23 0737)  Pulse: 82 (01/05/23 0737)  Resp: 18 (01/05/23 0752)  BP: 124/69 (01/05/23 0737)  SpO2: 99 % (01/05/23 0737) Vital Signs (24h Range):  Temp:  [97.8 °F (36.6 °C)-98.6 °F (37 °C)] 97.8 °F (36.6 °C)  Pulse:  [74-90] 82  Resp:  [16-18] 18  SpO2:  [97 %-100 %] 99 %  BP: (108-137)/(63-78) 124/69     Weight: 93.2 kg (205 lb 7.5 oz)  Body mass index is 25.68 kg/m².    Intake/Output - Last 3 Shifts         01/03 0700  01/04 0659 01/04 0700 01/05 0659 01/05 0700 01/06 0659    IV Piggyback 1049      Total Intake(mL/kg) 1049 (11.3)      Urine (mL/kg/hr)  300 (0.1)     Total Output  300     Net +1049 -300            Urine Occurrence  1 x             Physical Exam  Constitutional:       Appearance: He is well-developed. He is ill-appearing.   HENT:      Head: Normocephalic and atraumatic.   Eyes:      Conjunctiva/sclera: Conjunctivae normal.   Neck:      Thyroid: No thyromegaly.   Cardiovascular:      Rate and Rhythm: Normal rate and regular rhythm.   Pulmonary:      Effort: Pulmonary effort is normal. No respiratory distress.   Abdominal:      Comments: Soft, nondistended, +TTP suprapubic>LLQ; no rebound or guarding; no previous incisions    Musculoskeletal:         General: No tenderness. Normal range of motion.      Cervical back: Normal range of motion.   Skin:     General: Skin is warm and dry.      Capillary Refill: Capillary refill takes less than 2 seconds.      Findings: No rash.   Neurological:      Mental Status: He is alert and oriented to person, place, and time.       Significant Labs:  I have reviewed all pertinent lab results within the past 24 hours.  CBC:   Recent Labs   Lab 01/05/23  0253   WBC 9.41   RBC 4.82   HGB 14.1   HCT 41.1      MCV 85   MCH 29.3   MCHC 34.3     BMP:   Recent Labs   Lab 01/05/23  0253   GLU 84      K 4.2      CO2 26   BUN 9   CREATININE 1.1   CALCIUM 9.8   MG 1.9     CMP:   Recent Labs   Lab 01/03/23  1152 01/04/23  0607 01/05/23  0253   GLU 96   < > 84   CALCIUM 10.5   < > 9.8   ALBUMIN 4.5  --   --    PROT 8.0  --   --       < > 139   K 4.7   < > 4.2   CO2 22*   < > 26      < > 101   BUN 10   < > 9   CREATININE 1.0   < > 1.1   ALKPHOS 91  --   --    ALT 41  --   --    AST 19  --   --    BILITOT 0.7  --   --     < > = values in this interval not displayed.     LFTs:   Recent Labs   Lab 01/03/23  1152   ALT 41   AST 19   ALKPHOS 91   BILITOT 0.7   PROT 8.0   ALBUMIN 4.5       Significant Diagnostics:  I have reviewed all pertinent imaging results/findings within the past 24 hours.

## 2023-01-05 NOTE — ANESTHESIA PROCEDURE NOTES
Intubation    Date/Time: 1/5/2023 9:15 AM  Performed by: Nader Andre CRNA  Authorized by: Andres Royal II, MD     Intubation:     Induction:  Intravenous    Intubated:  Postinduction    Mask Ventilation:  Easy mask    Attempts:  1    Attempted By:  CRNA    Method of Intubation:  Direct    Blade:  Job 4    Laryngeal View Grade: Grade I - full view of cords      Difficult Airway Encountered?: No      Complications:  None    Airway Device:  Oral endotracheal tube    Airway Device Size:  8.0    Style/Cuff Inflation:  Cuffed (inflated to minimal occlusive pressure)    Tube secured:  24    Secured at:  The lips    Placement Verified By:  Capnometry    Complicating Factors:  None    Findings Post-Intubation:  BS equal bilateral and atraumatic/condition of teeth unchanged

## 2023-01-05 NOTE — ASSESSMENT & PLAN NOTE
31yo M with acute sigmoid diverticulitis    - to OR today for lap vs open sigmoidectomy, possible ostomy  - All risks, benefits and alternatives fully explained to patient. Risks include, but are not limited to, bleeding, infection, anastomotic leak, damage to ureter, damage to other intra-abdominal organs such as colon, rectum, small bowel, stomach, liver, bladder, reproductive organs, sexual dysfunction, urinary dysfunction, postoperative abscess, conversion to open operation, perioperative MI, CVA and death.  All questions field and appropriately answered to patient's satisfaction.  Consent signed and placed on chart.  - mech and abx bowel prep completed  - ERAS protocol  - NPO  - IVF  - IV abx  - WOCN marking completed

## 2023-01-05 NOTE — ANESTHESIA POSTPROCEDURE EVALUATION
"Anesthesia Post Evaluation    Patient: David Sevilla    Procedure(s) Performed: Procedure(s) (LRB):  COLECTOMY, SIGMOID, LAPAROSCOPIC (N/A)  BLOCK, TRANSVERSUS ABDOMINIS PLANE (N/A)  SIGMOIDOSCOPY, FLEXIBLE (N/A)  MOBILIZATION, SPLENIC FLEXURE (N/A)    Final Anesthesia Type: general      Patient location during evaluation: PACU  Patient participation: Yes- Able to Participate  Level of consciousness: awake and alert  Post-procedure vital signs: reviewed and stable  Pain management: adequate  Airway patency: patent  ADA mitigation strategies: Verification of full reversal of neuromuscular block  PONV status at discharge: No PONV  Anesthetic complications: no      Cardiovascular status: hemodynamically stable  Respiratory status: spontaneous ventilation  Hydration status: euvolemic  Follow-up not needed.          Vitals Value Taken Time   /75 01/05/23 1505   Temp 36.6 °C (97.8 °F) 01/05/23 1505   Pulse 80 01/05/23 1505   Resp 18 01/05/23 1505   SpO2 99 % 01/05/23 1505         Event Time   Out of Recovery 01/05/2023 14:18:58         Pain/Saleem Score: Pain Rating Prior to Med Admin: -- ("it hurts") (1/5/2023  2:05 PM)  Saleem Score: 9 (1/5/2023  2:00 PM)        "

## 2023-01-05 NOTE — ANESTHESIA PREPROCEDURE EVALUATION
01/05/2023  David Sevilla is a 32 y.o., male.      Pre-op Assessment    I have reviewed the Patient Summary Reports.    I have reviewed the NPO Status.   I have reviewed the Medications.     Review of Systems  Anesthesia Hx:  No problems with previous Anesthesia  History of prior surgery of interest to airway management or planning: Previous anesthesia: MAC  Denies Personal Hx of Anesthesia complications.   Social:  Smoker    Hematology/Oncology:  Hematology Normal        Cardiovascular:  Cardiovascular Normal  ECG has been reviewed. Normal sinus rhythm   Right ventricular conduction delay   Borderline Abnormal ECG   No previous ECGs available   Confirmed by Juan TOVAR, Hilary' BDarell ([id number]) on 11/30/2022 5:05:06 AM    Pulmonary:  Pulmonary Normal Patient reports that he sleeps elevated because lying flat causes him SOB - no hx of HF   Renal/:  Renal/ Normal     Hepatic/GI:  Hepatic/GI Normal diverticulitis   Neurological:  Neurology Normal    Endocrine:  Endocrine Normal    Psych:   anxiety depression          Physical Exam  General: Well nourished, Cooperative, Alert and Oriented    Airway:  Mallampati: I   Mouth Opening: Normal  TM Distance: Normal  Tongue: Normal  Neck ROM: Normal ROM    Dental:  Intact  Patient denies any currently loose or chipped teeth; Patient denies any removable dental appliances      Anesthesia Plan  Type of Anesthesia, risks & benefits discussed:    Anesthesia Type: Gen ETT  Intra-op Monitoring Plan: Standard ASA Monitors  Post Op Pain Control Plan: multimodal analgesia  Induction:  IV  Airway Plan: , Post-Induction  Informed Consent: Informed consent signed with the Patient and all parties understand the risks and agree with anesthesia plan.  All questions answered.   ASA Score: 2  Anesthesia Plan Notes:      Ready For Surgery From Anesthesia Perspective.     .       Chemistry        Component Value Date/Time     01/05/2023 0253    K 4.2 01/05/2023 0253     01/05/2023 0253    CO2 26 01/05/2023 0253    BUN 9 01/05/2023 0253    CREATININE 1.1 01/05/2023 0253    GLU 84 01/05/2023 0253        Component Value Date/Time    CALCIUM 9.8 01/05/2023 0253    ALKPHOS 91 01/03/2023 1152    AST 19 01/03/2023 1152    ALT 41 01/03/2023 1152    BILITOT 0.7 01/03/2023 1152        Lab Results   Component Value Date    WBC 9.41 01/05/2023    HGB 14.1 01/05/2023    HCT 41.1 01/05/2023    MCV 85 01/05/2023     01/05/2023       Normal sinus rhythm   Right ventricular conduction delay   Borderline Abnormal ECG   No previous ECGs available   Confirmed by Kari Martinez MD ([id number]) on 11/30/2022 5:05:06 AM

## 2023-01-05 NOTE — OP NOTE
[facility] - Surgery ([facility])  Surgery Department  Operative Note    SUMMARY     Date of Procedure: 1/5/2023     Procedure:  Laparoscopic sigmoid colectomy  Laparoscopic mobilization of splenic flexure  Transversus abdominis plane block  Flexible sigmoidoscopy    Surgeon(s) and Role:     * Prasad Alonso MD - Primary    Assisting Surgeon/Assistant: GIOVANNI Brown    Pre-Operative Diagnosis: Acute diverticulitis [K57.92]    Post-Operative Diagnosis: Post-Op Diagnosis Codes:     * Acute diverticulitis [K57.92]    Anesthesia: General    Technical Procedures Used:   Laparoscopic sigmoid colectomy  Laparoscopic mobilization of splenic flexure  Transversus abdominis plane block  Flexible sigmoidoscopy    Indications for Procedure:  32-year-old male with recurrent and smoldering diverticulitis of the sigmoid colon who presents for definitive surgical management    Findings of the Procedure:  Thickening of the sigmoid colon causing surrounding inflammation with attachment to the lateral abdominal sidewall and pelvic sidewall amenable to sigmoid colectomy    Description of the Procedure:  Patient was brought to the operating room and placed supine on table.  General endotracheal anesthesia was then induced.  A Ramos catheter was sterilely placed.  The patient was moved to lithotomy position.  A rectal washout was then performed.  The abdomen was then prepped and draped in usual sterile fashion.  A preoperative surgical time-out was performed confirming the correct patient, procedure and preop medications given.    A 12 mm infraumbilical incision was made beneath the umbilicus and dissection was carried down to level of fascia.  The fascia was sharply incised and the abdomen was entered sharply under direct visualization.  A 12 mm balloon trocar was inserted this defect and the abdomen was insufflated to pressure 15 mm of mercury.  The camera was introduced and there was no signs of injury upon entry.  There was no signs  of metastatic disease within the abdomen.  A laparoscopic transversus abdominis plane block was then performed using a mixture of 20 cc of Exparel, 30 cc of 0.25% bupivacaine plain and 10 cc of injectable saline.  12.5 cc injected into the transversus abdominis plane in all 4 quadrants for a total of 50 cc given for the block.  The remaining 10 cc was injected at the port sites and extraction site at the end of the case for local infiltration.  Two additional 5 mm trocars inserted into the right lower quadrant right upper quadrant usual fashion.    The attention was then turned to the sigmoid colon.  The sigmoid colon was found to be thickened in the midportion consistent with diverticulitis with some attachments doubled on itself as well as to the lateral abdominal sidewall and the pelvic inlet.  The rectum distal to this appeared to be normal as did the descending colon.  The white line of Toldt was then sharply incised and dissection was carried up the white line of Toldt along the descending colon to mobilize the left colon.  This was carried all the way to the level of the splenic flexure.  Once this was done as far as possible, attention was turned to the splenic flexure for mobilization.  The lesser sac was then entered above the transverse colon in usual fashion.  The splenic flexure was then mobilized by taking down the splenocolic, gastrocolic and phrenocolic ligaments using the LigaSure with care taken to protect the surrounding structures including the pancreas, the kidney exam the spleen.  Once this was done, the remaining flimsy attachments of the mesentery to the retroperitoneum were then sharply lysed in usual fashion to complete the mobilization of the splenic flexure.    Attention was then turned back to the sigmoid colon.  The sigmoid colon was then dissected off of its attachments from the inflammation and freed.  Once this was done the left ureter was then identified laterally in usual fashion  protected throughout the dissection process.  A mesenteric window was then made beneath the MONIKA pedicle in usual fashion.  A mediolateral dissection was then undertaken in the left ureter again was identified and protected.  The MONIKA was then ligated via high ligation using the laparoscopic LigaSure in usual fashion.  After taking it, there was no evidence of extravasation of blood from the stump was completely hemostatic.  The sigmoid colon was then mobilized all the way down to the rectosigmoid junction.  A mesenteric window was then made beneath the upper rectum where it was normal caliber.  The mesorectum was then divided using the laparoscopic LigaSure.  A 45 mm blue load stapler was then used to transect the rectum at this location with 2 fires being required.  After was done the rectal stump was completely hemostatic.  Once this was completed, attention was turned back to the MONIKA pedicle.  Dissection was carried up the MONIKA pedicle along the mesentery to a normal point on the descending sigmoid junction.  Once this was completed the specimen was ready to be exteriorized.    The abdomen was then desufflated.  A Pfannenstiel incision was then made.  Dissection was carried down to level of fascia which was incised in a longitudinal manner, the muscle was spread in the midline bluntly and the peritoneum was then incised sharply.  A wound protector was placed the specimen was then exteriorized through this defect.  The mesentery was then flashed at the descending sigmoid junction after a mesenteric window was made was confirmed there was good pulsatile blood flow which was then taken with the LigaSure.  The descending sigmoid junction was then manually clamped on either side using noncrushing bowel clamps and the bowel was divided between these clamps using a 10 blade.  The sigmoid colon was then passed off the field for final pathology.    The 29 mm EEA stapler was then brought onto the field.  The anvil of the  stapler was then sewn into the cut end of the descending colon in usual fashion.  After this was completed, the anvil and descending colon were then placed back within the abdominal cavity.  The wound protector was manually clamped and the abdomen was then re-insufflated.  Rectal sizers were then used through the anus to confirm easy passage to the rectal staple line in usual fashion.  The 29 mm EEA stapler was then brought through the anus after 2 fingerbreadth dilation up to the rectal staple line and the spike was brought out just anterior to this.  The spike and anvil were then  together laparoscopically.  It was confirmed that the colon was straight without tension and had no twisting of the mesentery.  The stapler was then closed with care taken to ensure there was no incorporating surrounding structures.  The stapler was then fired in usual fashion.  Upon withdrawing the stapler, there were 2 intact anastomotic donuts which were sent for final pathology.  The proximal descending colon was then manually clamped and the pelvis was filled with irrigation.  A flexible sigmoidoscope was inserted to the anus and the colon rectum were insufflated.  There was a negative air leak test laparoscopically.  There was a small bleeder on the staple line which was controlled with 2 endoscopic clips.  Once this was done the anastomosis was widely patent and hemostatic.  The colon rectum were then desufflated the scope was withdrawn.    Attention was turned back laparoscopically.  All sites were found to be hemostatic and all irrigation was suctioned.  The omentum was placed over the abdominal contents.  The 12 mm balloon port was then removed.  The fascia was then closed using a Jose M-Raoul device with 0 Vicryl suture in a figure-of-eight fashion in usual fashion.  Once this was completed, the rest the abdomen checked and found to be hemostatic.  The abdomen was then desufflated.  The remaining ports were removed  as was the wound protector.  The Pfannenstiel incision was then closed in multiple layers with a peritoneal layer of 0 Vicryl runner and then a fascial layer of a #1 nonlooped PDS suture.  Once this was completed all sites were copiously irrigated made hemostatic.  All sites were injected with local infiltration.  All sites were then closed with 4-0 Monocryl suture.  Skin glue was then applied.  The patient was then moved back in the supine position.  He was woken from general endotracheal anesthesia and taken to the postanesthesia care in stable condition.  He tolerated procedure well.  All sponge, needle and instrument counts were correct at the end of the case.    Significant Surgical Tasks Conducted by the Assistant(s), if Applicable: Assisted with all portions of the operation as it was required to help with the positioning, exposure and closure to complete the operation    Complications: No    Estimated Blood Loss (EBL): *50mL           Implants: * No implants in log *    Specimens:   Specimen (24h ago, onward)       Start     Ordered    01/05/23 1141  Specimen to Pathology, Surgery General Surgery  Once        Comments: Pre-op Diagnosis: Acute diverticulitis [K57.92]Procedure(s):COLECTOMY, SIGMOID, LAPAROSCOPICBLOCK, TRANSVERSUS ABDOMINIS PLANE SIGMOIDOSCOPY, FLEXIBLENumber of specimens: 2Name of specimens: 1. Sigmoid Colon (PERM), 2. Anastomotic Donuts (PERM)     References:    Click here for ordering Quick Tip   Question Answer Comment   Procedure Type: General Surgery    Specimen Class: Routine/Screening    Which provider would you like to cc? BARBARA DEMARCO    Release to patient Immediate        01/05/23 1232                            Condition: Good    Disposition: PACU - hemodynamically stable.    Attestation: I performed the procedure.

## 2023-01-05 NOTE — PLAN OF CARE
Pt AAO x4 but drowsy after surgery.   Pt remains free of falls throughout shift.  Pain and nausea addressed with PRN medications.   Plan of care reviewed. Pt verbalizes understanding.  Pt moving and turning independently. Frequent weight shifting encouraged.  Lab sites to abd- [facility].   Ramos placed in surgery to be remove tomorrow morning per order.  LR continued per order at 100ml/hr.  IV abx administered per order.   Hourly rounding completed.

## 2023-01-05 NOTE — CONSULTS
[facility] - Tele[facility] ([facility])  Wound Care    Patient Name:  David Sevilla   MRN:  [id number]  Date: 1/5/2023  Diagnosis: <principal problem not specified>    History:     Past Medical History:   Diagnosis Date    Diverticulitis     Diverticulitis        Social History     Socioeconomic History    Marital status: Single   Tobacco Use    Smoking status: Every Day     Types: Vaping with nicotine   Substance and Sexual Activity    Drug use: Yes     Types: Marijuana     Social Determinants of Health     Financial Resource Strain: Medium Risk    Difficulty of Paying Living Expenses: Somewhat hard   Food Insecurity: Food Insecurity Present    Worried About Running Out of Food in the Last Year: Sometimes true    Ran Out of Food in the Last Year: Sometimes true   Transportation Needs: No Transportation Needs    Lack of Transportation (Medical): No    Lack of Transportation (Non-Medical): No   Physical Activity: Insufficiently Active    Days of Exercise per Week: 2 days    Minutes of Exercise per Session: 60 min   Stress: Stress Concern Present    Feeling of Stress : Very much   Social Connections: Socially Isolated    Frequency of Communication with Friends and Family: Twice a week    Frequency of Social Gatherings with Friends and Family: Once a week    Attends Yarsani Services: Never    Active Member of Clubs or Organizations: No    Attends Club or Organization Meetings: Never    Marital Status: Never    Housing Stability: Low Risk     Unable to Pay for Housing in the Last Year: No    Number of Places Lived in the Last Year: 2    Unstable Housing in the Last Year: No       Precautions:     Allergies as of 01/03/2023 - Reviewed 01/03/2023   Allergen Reaction Noted    Morphine Nausea And Vomiting and Other (See Comments) 12/29/2022       [facility] Assessment Details/Treatment     Patient marked for potential ostomy in all 4 quadrants.    01/05/2023

## 2023-01-05 NOTE — HOSPITAL COURSE
01/05/2023: No acute events overnight. Still with suprapubic pain. Ready for OR today.    01/06/2023: POD 1. Tolerating clear liquid diet with no nausea or vomiting. Has not ambulated yet. No flatus or BM. Reports soreness surrounding abdominal incisions.       01/07/2023 pod 2 patient reports bloody bowel movement with gas pains, tolerating mild amount of liquid, ambulating    01/08/2023 postop day 3 patient tolerating diet without nausea but still having moderate gas pains when eating, passing flatus and bowel movement, ambulating in room, regular diet as tolerated, transition to p.o. antibiotics    01/09/2023 - POD 4, tolerating diet, reports some nausea and gas pains. Having bowel movements. Ambulating. Ready for discharge.

## 2023-01-05 NOTE — TRANSFER OF CARE
"Anesthesia Transfer of Care Note    Patient: David Sevilla    Procedure(s) Performed: Procedure(s) (LRB):  COLECTOMY, SIGMOID, LAPAROSCOPIC (N/A)  BLOCK, TRANSVERSUS ABDOMINIS PLANE (N/A)  SIGMOIDOSCOPY, FLEXIBLE (N/A)  MOBILIZATION, SPLENIC FLEXURE (N/A)    Patient location: PACU    Anesthesia Type: general    Transport from OR: Transported from OR on room air with adequate spontaneous ventilation    Post pain: adequate analgesia    Post assessment: no apparent anesthetic complications and tolerated procedure well    Post vital signs: stable    Level of consciousness: awake, responds to stimulation and alert    Nausea/Vomiting: no nausea/vomiting    Complications: none    Transfer of care protocol was followed      Last vitals:   Visit Vitals  /68   Pulse 101   Temp 36.8 °C (98.2 °F) (Temporal)   Resp 16   Ht 6' 3" (1.905 m)   Wt 93.2 kg (205 lb 7.5 oz)   SpO2 96%   BMI 25.68 kg/m²     "

## 2023-01-05 NOTE — PROGRESS NOTES
[facility] ([facility])  Colorectal Surgery  Progress Note    Subjective:     Interval History: No acute events overnight. Still with suprapubic pain. Ready for OR today.    Medications:  Continuous Infusions:   lactated ringers 100 mL/hr at 01/05/23 0504     Scheduled Meds:   piperacillin-tazobactam (ZOSYN) IVPB  4.5 g Intravenous Q8H    polyethylene glycol  17 g Oral BID     PRN Meds:sodium chloride 0.9%, sodium chloride 0.9%, acetaminophen, BUPivacaine (PF) 0.25% (2.5 mg/ml), BUPivacaine liposome (PF), HYDROmorphone, LIDOcaine (PF) 10 mg/ml (1%), melatonin, oxyCODONE, oxyCODONE, promethazine (PHENERGAN) IVPB, sodium chloride 0.9%     Review of patient's allergies indicates:   Allergen Reactions    Morphine Nausea And Vomiting and Other (See Comments)     migraine     Objective:     Vital Signs (Most Recent):  Temp: 97.8 °F (36.6 °C) (01/05/23 0737)  Pulse: 82 (01/05/23 0737)  Resp: 18 (01/05/23 0752)  BP: 124/69 (01/05/23 0737)  SpO2: 99 % (01/05/23 0737) Vital Signs (24h Range):  Temp:  [97.8 °F (36.6 °C)-98.6 °F (37 °C)] 97.8 °F (36.6 °C)  Pulse:  [74-90] 82  Resp:  [16-18] 18  SpO2:  [97 %-100 %] 99 %  BP: (108-137)/(63-78) 124/69     Weight: 93.2 kg (205 lb 7.5 oz)  Body mass index is 25.68 kg/m².    Intake/Output - Last 3 Shifts         01/03 0700 01/04 0659 01/04 0700 01/05 0659 01/05 0700 01/06 0659    IV Piggyback 1049      Total Intake(mL/kg) 1049 (11.3)      Urine (mL/kg/hr)  300 (0.1)     Total Output  300     Net +1049 -300            Urine Occurrence  1 x             Physical Exam  Constitutional:       Appearance: He is well-developed. He is ill-appearing.   HENT:      Head: Normocephalic and atraumatic.   Eyes:      Conjunctiva/sclera: Conjunctivae normal.   Neck:      Thyroid: No thyromegaly.   Cardiovascular:      Rate and Rhythm: Normal rate and regular rhythm.   Pulmonary:      Effort: Pulmonary effort is normal. No respiratory distress.   Abdominal:      Comments: Soft,  nondistended, +TTP suprapubic>LLQ; no rebound or guarding; no previous incisions   Musculoskeletal:         General: No tenderness. Normal range of motion.      Cervical back: Normal range of motion.   Skin:     General: Skin is warm and dry.      Capillary Refill: Capillary refill takes less than 2 seconds.      Findings: No rash.   Neurological:      Mental Status: He is alert and oriented to person, place, and time.       Significant Labs:  I have reviewed all pertinent lab results within the past 24 hours.  CBC:   Recent Labs   Lab 01/05/23  0253   WBC 9.41   RBC 4.82   HGB 14.1   HCT 41.1      MCV 85   MCH 29.3   MCHC 34.3     BMP:   Recent Labs   Lab 01/05/23  0253   GLU 84      K 4.2      CO2 26   BUN 9   CREATININE 1.1   CALCIUM 9.8   MG 1.9     CMP:   Recent Labs   Lab 01/03/23  1152 01/04/23  0607 01/05/23  0253   GLU 96   < > 84   CALCIUM 10.5   < > 9.8   ALBUMIN 4.5  --   --    PROT 8.0  --   --       < > 139   K 4.7   < > 4.2   CO2 22*   < > 26      < > 101   BUN 10   < > 9   CREATININE 1.0   < > 1.1   ALKPHOS 91  --   --    ALT 41  --   --    AST 19  --   --    BILITOT 0.7  --   --     < > = values in this interval not displayed.     LFTs:   Recent Labs   Lab 01/03/23  1152   ALT 41   AST 19   ALKPHOS 91   BILITOT 0.7   PROT 8.0   ALBUMIN 4.5       Significant Diagnostics:  I have reviewed all pertinent imaging results/findings within the past 24 hours.    Assessment/Plan:     Acute diverticulitis  31yo M with acute sigmoid diverticulitis    - to OR today for lap vs open sigmoidectomy, possible ostomy  - All risks, benefits and alternatives fully explained to patient. Risks include, but are not limited to, bleeding, infection, anastomotic leak, damage to ureter, damage to other intra-abdominal organs such as colon, rectum, small bowel, stomach, liver, bladder, reproductive organs, sexual dysfunction, urinary dysfunction, postoperative abscess, conversion to open  operation, perioperative MI, CVA and death.  All questions field and appropriately answered to patient's satisfaction.  Consent signed and placed on chart.  - mech and abx bowel prep completed  - ERAS protocol  - NPO  - IVF  - IV abx  - WOCN marking completed     Depression  Stable, monitor, continue appropriate home meds        Prasad Alonso MD  Colorectal Surgery  [facility] - Surgery (Hospital)

## 2023-01-06 LAB
ANION GAP SERPL CALC-SCNC: 12 MMOL/L (ref 8–16)
BASOPHILS # BLD AUTO: 0.03 K/UL (ref 0–0.2)
BASOPHILS NFR BLD: 0.2 % (ref 0–1.9)
BUN SERPL-MCNC: 9 MG/DL (ref 6–20)
CALCIUM SERPL-MCNC: 9.9 MG/DL (ref 8.7–10.5)
CHLORIDE SERPL-SCNC: 100 MMOL/L (ref 95–110)
CO2 SERPL-SCNC: 26 MMOL/L (ref 23–29)
CREAT SERPL-MCNC: 1 MG/DL (ref 0.5–1.4)
DIFFERENTIAL METHOD: ABNORMAL
EOSINOPHIL # BLD AUTO: 0.1 K/UL (ref 0–0.5)
EOSINOPHIL NFR BLD: 0.5 % (ref 0–8)
ERYTHROCYTE [DISTWIDTH] IN BLOOD BY AUTOMATED COUNT: 11.8 % (ref 11.5–14.5)
EST. GFR  (NO RACE VARIABLE): >60 ML/MIN/1.73 M^2
GLUCOSE SERPL-MCNC: 72 MG/DL (ref 70–110)
HCT VFR BLD AUTO: 40.2 % (ref 40–54)
HGB BLD-MCNC: 13.8 G/DL (ref 14–18)
IMM GRANULOCYTES # BLD AUTO: 0.03 K/UL (ref 0–0.04)
IMM GRANULOCYTES NFR BLD AUTO: 0.2 % (ref 0–0.5)
LYMPHOCYTES # BLD AUTO: 3.3 K/UL (ref 1–4.8)
LYMPHOCYTES NFR BLD: 25.7 % (ref 18–48)
MAGNESIUM SERPL-MCNC: 1.7 MG/DL (ref 1.6–2.6)
MCH RBC QN AUTO: 29.2 PG (ref 27–31)
MCHC RBC AUTO-ENTMCNC: 34.3 G/DL (ref 32–36)
MCV RBC AUTO: 85 FL (ref 82–98)
MONOCYTES # BLD AUTO: 1.5 K/UL (ref 0.3–1)
MONOCYTES NFR BLD: 11.3 % (ref 4–15)
NEUTROPHILS # BLD AUTO: 7.9 K/UL (ref 1.8–7.7)
NEUTROPHILS NFR BLD: 62.1 % (ref 38–73)
NRBC BLD-RTO: 0 /100 WBC
PHOSPHATE SERPL-MCNC: 3.8 MG/DL (ref 2.7–4.5)
PLATELET # BLD AUTO: 403 K/UL (ref 150–450)
PMV BLD AUTO: 9.8 FL (ref 9.2–12.9)
POTASSIUM SERPL-SCNC: 4.4 MMOL/L (ref 3.5–5.1)
RBC # BLD AUTO: 4.72 M/UL (ref 4.6–6.2)
SODIUM SERPL-SCNC: 138 MMOL/L (ref 136–145)
WBC # BLD AUTO: 12.79 K/UL (ref 3.9–12.7)

## 2023-01-06 PROCEDURE — 83735 ASSAY OF MAGNESIUM: CPT | Performed by: COLON & RECTAL SURGERY

## 2023-01-06 PROCEDURE — 99024 POSTOP FOLLOW-UP VISIT: CPT | Mod: ,,,

## 2023-01-06 PROCEDURE — 84100 ASSAY OF PHOSPHORUS: CPT | Performed by: COLON & RECTAL SURGERY

## 2023-01-06 PROCEDURE — 85025 COMPLETE CBC W/AUTO DIFF WBC: CPT | Performed by: COLON & RECTAL SURGERY

## 2023-01-06 PROCEDURE — 36415 COLL VENOUS BLD VENIPUNCTURE: CPT | Performed by: COLON & RECTAL SURGERY

## 2023-01-06 PROCEDURE — 99900035 HC TECH TIME PER 15 MIN (STAT)

## 2023-01-06 PROCEDURE — 99024 PR POST-OP FOLLOW-UP VISIT: ICD-10-PCS | Mod: ,,,

## 2023-01-06 PROCEDURE — 80048 BASIC METABOLIC PNL TOTAL CA: CPT | Performed by: COLON & RECTAL SURGERY

## 2023-01-06 PROCEDURE — 21400001 HC TELEMETRY ROOM

## 2023-01-06 PROCEDURE — 63600175 PHARM REV CODE 636 W HCPCS: Performed by: COLON & RECTAL SURGERY

## 2023-01-06 PROCEDURE — 94761 N-INVAS EAR/PLS OXIMETRY MLT: CPT

## 2023-01-06 PROCEDURE — 25000003 PHARM REV CODE 250: Performed by: COLON & RECTAL SURGERY

## 2023-01-06 RX ADMIN — OXYCODONE HYDROCHLORIDE 10 MG: 5 TABLET ORAL at 10:01

## 2023-01-06 RX ADMIN — PIPERACILLIN SODIUM AND TAZOBACTAM SODIUM 4.5 G: 4; .5 INJECTION, POWDER, LYOPHILIZED, FOR SOLUTION INTRAVENOUS at 12:01

## 2023-01-06 RX ADMIN — OXYCODONE HYDROCHLORIDE 10 MG: 5 TABLET ORAL at 05:01

## 2023-01-06 RX ADMIN — OXYCODONE HYDROCHLORIDE 10 MG: 5 TABLET ORAL at 02:01

## 2023-01-06 RX ADMIN — SODIUM CHLORIDE, POTASSIUM CHLORIDE, SODIUM LACTATE AND CALCIUM CHLORIDE: 600; 310; 30; 20 INJECTION, SOLUTION INTRAVENOUS at 05:01

## 2023-01-06 RX ADMIN — HYDROMORPHONE HYDROCHLORIDE 0.5 MG: 1 INJECTION, SOLUTION INTRAMUSCULAR; INTRAVENOUS; SUBCUTANEOUS at 01:01

## 2023-01-06 RX ADMIN — OXYCODONE HYDROCHLORIDE 10 MG: 5 TABLET ORAL at 06:01

## 2023-01-06 RX ADMIN — PIPERACILLIN SODIUM AND TAZOBACTAM SODIUM 4.5 G: 4; .5 INJECTION, POWDER, LYOPHILIZED, FOR SOLUTION INTRAVENOUS at 08:01

## 2023-01-06 RX ADMIN — POLYETHYLENE GLYCOL 3350 17 G: 17 POWDER, FOR SOLUTION ORAL at 08:01

## 2023-01-06 RX ADMIN — PIPERACILLIN SODIUM AND TAZOBACTAM SODIUM 4.5 G: 4; .5 INJECTION, POWDER, LYOPHILIZED, FOR SOLUTION INTRAVENOUS at 05:01

## 2023-01-06 NOTE — SUBJECTIVE & OBJECTIVE
Interval History: POD 1. Tolerating clear liquid diet with no nausea or vomiting. Has not ambulated yet. No flatus or BM. Reports soreness surrounding abdominal incisions.     Medications:  Continuous Infusions:   lactated ringers Stopped (01/06/23 0534)     Scheduled Meds:   piperacillin-tazobactam (ZOSYN) IVPB  4.5 g Intravenous Q8H    polyethylene glycol  17 g Oral BID     PRN Meds:sodium chloride 0.9%, sodium chloride 0.9%, acetaminophen, HYDROmorphone, LIDOcaine (PF) 10 mg/ml (1%), melatonin, ondansetron, oxyCODONE, oxyCODONE, promethazine (PHENERGAN) IVPB, sodium chloride 0.9%     Review of patient's allergies indicates:   Allergen Reactions    Morphine Nausea And Vomiting and Other (See Comments)     migraine     Objective:     Vital Signs (Most Recent):  Temp: 98.4 °F (36.9 °C) (01/06/23 0727)  Pulse: 83 (01/06/23 0727)  Resp: 14 (01/06/23 0727)  BP: 126/67 (01/06/23 0727)  SpO2: 96 % (01/06/23 0727) Vital Signs (24h Range):  Temp:  [97.8 °F (36.6 °C)-99.4 °F (37.4 °C)] 98.4 °F (36.9 °C)  Pulse:  [] 83  Resp:  [12-30] 14  SpO2:  [93 %-100 %] 96 %  BP: (109-142)/(44-79) 126/67     Weight: 95.1 kg (209 lb 10.5 oz)  Body mass index is 26.21 kg/m².    Intake/Output - Last 3 Shifts         01/04 0700  01/05 0659 01/05 0700 01/06 0659 01/06 0700 01/07 0659    I.V. (mL/kg)   1812.6 (19.1)    IV Piggyback  1000 869.7    Total Intake(mL/kg)  1000 (10.5) 2682.3 (28.2)    Urine (mL/kg/hr) 300 (0.1) 200 (0.1)     Stool  0     Total Output 300 200     Net -300 +800 +2682.3           Urine Occurrence 1 x      Stool Occurrence  1 x             Physical Exam  Vitals and nursing note reviewed.   Constitutional:       General: He is not in acute distress.     Appearance: He is well-developed and normal weight. He is not ill-appearing.   HENT:      Head: Normocephalic and atraumatic.      Right Ear: External ear normal.      Left Ear: External ear normal.      Nose: Nose normal.   Eyes:      Conjunctiva/sclera:  Conjunctivae normal.   Neck:      Thyroid: No thyromegaly.   Cardiovascular:      Rate and Rhythm: Normal rate.   Pulmonary:      Effort: Pulmonary effort is normal. No respiratory distress.   Abdominal:      General: Abdomen is flat. There is no distension.      Palpations: Abdomen is soft.      Tenderness: There is abdominal tenderness (tender surrounding abdominal incisions).      Comments: Surgical sites clean and dry without signs of infection.    Musculoskeletal:         General: No tenderness. Normal range of motion.      Cervical back: Normal range of motion.   Skin:     General: Skin is warm and dry.      Capillary Refill: Capillary refill takes less than 2 seconds.      Findings: No rash.   Neurological:      Mental Status: He is alert and oriented to person, place, and time.       Significant Labs:  I have reviewed all pertinent lab results within the past 24 hours.  CBC:   Recent Labs   Lab 01/06/23  0441   WBC 12.79*   RBC 4.72   HGB 13.8*   HCT 40.2      MCV 85   MCH 29.2   MCHC 34.3     BMP:   Recent Labs   Lab 01/06/23  0441   GLU 72      K 4.4      CO2 26   BUN 9   CREATININE 1.0   CALCIUM 9.9   MG 1.7     Significant Diagnostics:  I have reviewed all pertinent imaging results/findings within the past 24 hours.

## 2023-01-06 NOTE — PROGRESS NOTES
[facility])  Colorectal Surgery  Progress Note    Subjective:     History of Present Illness:  32 y.o. male presents with previous complicated diverticulitis requiring hospitalization in October 2022 treated nonoperatively who represents to the ED with a one day history of lower abdominal pain, mostly suprapubic. Patient has had intermittent abdominal pain since his original diagnosis in October that has been intermittent, as well as intermittently treated by ED with PO abx, most recently Augmentin on 12/20/22. Denies current fever, chills, nausea, vomiting, hematochezia, melena. Never had colonoscopy. No family history of CRC, polpys, IBD. No previous abdominal surgeries. Workup in ED positive for leukocytosis of 13k and CT scan concerning for sigmoid diverticulitis. CRS consulted for evaluation and admission.      Post-Op Info:  Procedure(s) (LRB):  COLECTOMY, SIGMOID, LAPAROSCOPIC (N/A)  BLOCK, TRANSVERSUS ABDOMINIS PLANE (N/A)  SIGMOIDOSCOPY, FLEXIBLE (N/A)  MOBILIZATION, SPLENIC FLEXURE (N/A)   1 Day Post-Op     Interval History: POD 1. Tolerating clear liquid diet with no nausea or vomiting. Has not ambulated yet. No flatus or BM. Reports soreness surrounding abdominal incisions.     Medications:  Continuous Infusions:   lactated ringers Stopped (01/06/23 0534)     Scheduled Meds:   piperacillin-tazobactam (ZOSYN) IVPB  4.5 g Intravenous Q8H    polyethylene glycol  17 g Oral BID     PRN Meds:sodium chloride 0.9%, sodium chloride 0.9%, acetaminophen, HYDROmorphone, LIDOcaine (PF) 10 mg/ml (1%), melatonin, ondansetron, oxyCODONE, oxyCODONE, promethazine (PHENERGAN) IVPB, sodium chloride 0.9%     Review of patient's allergies indicates:   Allergen Reactions    Morphine Nausea And Vomiting and Other (See Comments)     migraine     Objective:     Vital Signs (Most Recent):  Temp: 98.4 °F (36.9 °C) (01/06/23 0727)  Pulse: 83 (01/06/23 0727)  Resp: 14 (01/06/23 0727)  BP: 126/67 (01/06/23  0727)  SpO2: 96 % (01/06/23 0727) Vital Signs (24h Range):  Temp:  [97.8 °F (36.6 °C)-99.4 °F (37.4 °C)] 98.4 °F (36.9 °C)  Pulse:  [] 83  Resp:  [12-30] 14  SpO2:  [93 %-100 %] 96 %  BP: (109-142)/(44-79) 126/67     Weight: 95.1 kg (209 lb 10.5 oz)  Body mass index is 26.21 kg/m².    Intake/Output - Last 3 Shifts         01/04 0700  01/05 0659 01/05 0700  01/06 0659 01/06 0700 01/07 0659    I.V. (mL/kg)   1812.6 (19.1)    IV Piggyback  1000 869.7    Total Intake(mL/kg)  1000 (10.5) 2682.3 (28.2)    Urine (mL/kg/hr) 300 (0.1) 200 (0.1)     Stool  0     Total Output 300 200     Net -300 +800 +2682.3           Urine Occurrence 1 x      Stool Occurrence  1 x             Physical Exam  Vitals and nursing note reviewed.   Constitutional:       General: He is not in acute distress.     Appearance: He is well-developed and normal weight. He is not ill-appearing.   HENT:      Head: Normocephalic and atraumatic.      Right Ear: External ear normal.      Left Ear: External ear normal.      Nose: Nose normal.   Eyes:      Conjunctiva/sclera: Conjunctivae normal.   Neck:      Thyroid: No thyromegaly.   Cardiovascular:      Rate and Rhythm: Normal rate.   Pulmonary:      Effort: Pulmonary effort is normal. No respiratory distress.   Abdominal:      General: Abdomen is flat. There is no distension.      Palpations: Abdomen is soft.      Tenderness: There is abdominal tenderness (tender surrounding abdominal incisions).      Comments: Surgical sites clean and dry without signs of infection.    Musculoskeletal:         General: No tenderness. Normal range of motion.      Cervical back: Normal range of motion.   Skin:     General: Skin is warm and dry.      Capillary Refill: Capillary refill takes less than 2 seconds.      Findings: No rash.   Neurological:      Mental Status: He is alert and oriented to person, place, and time.       Significant Labs:  I have reviewed all pertinent lab results within the past 24 hours.  CBC:    Recent Labs   Lab 01/06/23  0441   WBC 12.79*   RBC 4.72   HGB 13.8*   HCT 40.2      MCV 85   MCH 29.2   MCHC 34.3     BMP:   Recent Labs   Lab 01/06/23  0441   GLU 72      K 4.4      CO2 26   BUN 9   CREATININE 1.0   CALCIUM 9.9   MG 1.7     Significant Diagnostics:  I have reviewed all pertinent imaging results/findings within the past 24 hours.    Assessment/Plan:     * Acute diverticulitis  31yo M with acute sigmoid diverticulitis s/p laparoscopic sigmoidectomy on 01/05/23    - continue clear liquid diet this morning  - encouraged OOB, ambulation - may advance diet following successful ambulation   - ERAS protocol  - IVF  - IV abx  - continue prn pain control  - pathology pending  - incentive spirometry     Depression  Stable, monitor, continue appropriate home meds      Sharif Rai PA-C  Colorectal Surgery  [facility] - Telemetry ([facility])

## 2023-01-06 NOTE — ASSESSMENT & PLAN NOTE
31yo M with acute sigmoid diverticulitis s/p laparoscopic sigmoidectomy on 01/05/23    - continue clear liquid diet this morning  - encouraged OOB, ambulation - may advance diet following successful ambulation   - ERAS protocol  - IVF  - IV abx  - continue prn pain control  - pathology pending  - incentive spirometry

## 2023-01-07 LAB
ANION GAP SERPL CALC-SCNC: 14 MMOL/L (ref 8–16)
BASOPHILS # BLD AUTO: 0.06 K/UL (ref 0–0.2)
BASOPHILS NFR BLD: 0.5 % (ref 0–1.9)
BUN SERPL-MCNC: 8 MG/DL (ref 6–20)
CALCIUM SERPL-MCNC: 10.4 MG/DL (ref 8.7–10.5)
CHLORIDE SERPL-SCNC: 98 MMOL/L (ref 95–110)
CO2 SERPL-SCNC: 28 MMOL/L (ref 23–29)
CREAT SERPL-MCNC: 1 MG/DL (ref 0.5–1.4)
DIFFERENTIAL METHOD: ABNORMAL
EOSINOPHIL # BLD AUTO: 0.3 K/UL (ref 0–0.5)
EOSINOPHIL NFR BLD: 2 % (ref 0–8)
ERYTHROCYTE [DISTWIDTH] IN BLOOD BY AUTOMATED COUNT: 11.9 % (ref 11.5–14.5)
EST. GFR  (NO RACE VARIABLE): >60 ML/MIN/1.73 M^2
GLUCOSE SERPL-MCNC: 90 MG/DL (ref 70–110)
HCT VFR BLD AUTO: 40 % (ref 40–54)
HGB BLD-MCNC: 13.8 G/DL (ref 14–18)
IMM GRANULOCYTES # BLD AUTO: 0.04 K/UL (ref 0–0.04)
IMM GRANULOCYTES NFR BLD AUTO: 0.3 % (ref 0–0.5)
LYMPHOCYTES # BLD AUTO: 3.5 K/UL (ref 1–4.8)
LYMPHOCYTES NFR BLD: 27.9 % (ref 18–48)
MAGNESIUM SERPL-MCNC: 1.7 MG/DL (ref 1.6–2.6)
MCH RBC QN AUTO: 29.7 PG (ref 27–31)
MCHC RBC AUTO-ENTMCNC: 34.5 G/DL (ref 32–36)
MCV RBC AUTO: 86 FL (ref 82–98)
MONOCYTES # BLD AUTO: 1.1 K/UL (ref 0.3–1)
MONOCYTES NFR BLD: 9.1 % (ref 4–15)
NEUTROPHILS # BLD AUTO: 7.5 K/UL (ref 1.8–7.7)
NEUTROPHILS NFR BLD: 60.2 % (ref 38–73)
NRBC BLD-RTO: 0 /100 WBC
PHOSPHATE SERPL-MCNC: 3.3 MG/DL (ref 2.7–4.5)
PLATELET # BLD AUTO: 398 K/UL (ref 150–450)
PMV BLD AUTO: 9.5 FL (ref 9.2–12.9)
POTASSIUM SERPL-SCNC: 4 MMOL/L (ref 3.5–5.1)
RBC # BLD AUTO: 4.64 M/UL (ref 4.6–6.2)
SODIUM SERPL-SCNC: 140 MMOL/L (ref 136–145)
WBC # BLD AUTO: 12.51 K/UL (ref 3.9–12.7)

## 2023-01-07 PROCEDURE — 83735 ASSAY OF MAGNESIUM: CPT | Performed by: COLON & RECTAL SURGERY

## 2023-01-07 PROCEDURE — 99900035 HC TECH TIME PER 15 MIN (STAT)

## 2023-01-07 PROCEDURE — 94761 N-INVAS EAR/PLS OXIMETRY MLT: CPT

## 2023-01-07 PROCEDURE — 21400001 HC TELEMETRY ROOM

## 2023-01-07 PROCEDURE — 80048 BASIC METABOLIC PNL TOTAL CA: CPT | Performed by: COLON & RECTAL SURGERY

## 2023-01-07 PROCEDURE — 25000003 PHARM REV CODE 250: Performed by: COLON & RECTAL SURGERY

## 2023-01-07 PROCEDURE — 85025 COMPLETE CBC W/AUTO DIFF WBC: CPT | Performed by: COLON & RECTAL SURGERY

## 2023-01-07 PROCEDURE — 36415 COLL VENOUS BLD VENIPUNCTURE: CPT | Performed by: COLON & RECTAL SURGERY

## 2023-01-07 PROCEDURE — 84100 ASSAY OF PHOSPHORUS: CPT | Performed by: COLON & RECTAL SURGERY

## 2023-01-07 PROCEDURE — 63600175 PHARM REV CODE 636 W HCPCS: Performed by: COLON & RECTAL SURGERY

## 2023-01-07 RX ADMIN — OXYCODONE HYDROCHLORIDE 10 MG: 5 TABLET ORAL at 08:01

## 2023-01-07 RX ADMIN — SODIUM CHLORIDE, POTASSIUM CHLORIDE, SODIUM LACTATE AND CALCIUM CHLORIDE: 600; 310; 30; 20 INJECTION, SOLUTION INTRAVENOUS at 04:01

## 2023-01-07 RX ADMIN — PIPERACILLIN SODIUM AND TAZOBACTAM SODIUM 4.5 G: 4; .5 INJECTION, POWDER, LYOPHILIZED, FOR SOLUTION INTRAVENOUS at 08:01

## 2023-01-07 RX ADMIN — POLYETHYLENE GLYCOL 3350 17 G: 17 POWDER, FOR SOLUTION ORAL at 08:01

## 2023-01-07 RX ADMIN — PIPERACILLIN SODIUM AND TAZOBACTAM SODIUM 4.5 G: 4; .5 INJECTION, POWDER, LYOPHILIZED, FOR SOLUTION INTRAVENOUS at 05:01

## 2023-01-07 RX ADMIN — OXYCODONE HYDROCHLORIDE 10 MG: 5 TABLET ORAL at 12:01

## 2023-01-07 RX ADMIN — OXYCODONE HYDROCHLORIDE 10 MG: 5 TABLET ORAL at 04:01

## 2023-01-07 RX ADMIN — PIPERACILLIN SODIUM AND TAZOBACTAM SODIUM 4.5 G: 4; .5 INJECTION, POWDER, LYOPHILIZED, FOR SOLUTION INTRAVENOUS at 12:01

## 2023-01-07 NOTE — PLAN OF CARE
Plan of Care: RN Shift Summary & Bedside Handover Report  01/06/2023 6:12 PM    Pt admitted on 1/3/2023 for Acute diverticulitis under Prasad Alonso MD service   Code Status Full Code    Past Medical/ Surgical Hx Past Medical History:   Diagnosis Date    Diverticulitis     Diverticulitis       Past Surgical History:   Procedure Laterality Date    FLEXIBLE SIGMOIDOSCOPY N/A 1/5/2023    Procedure: SIGMOIDOSCOPY, FLEXIBLE;  Surgeon: Prasad Alonso MD;  Location: [facility] OR;  Service: General;  Laterality: N/A;    INJECTION OF ANESTHETIC AGENT INTO TISSUE PLANE DEFINED BY TRANSVERSUS ABDOMINIS MUSCLE N/A 1/5/2023    Procedure: BLOCK, TRANSVERSUS ABDOMINIS PLANE;  Surgeon: Prasad Alonso MD;  Location: [facility] OR;  Service: General;  Laterality: N/A;    LAPAROSCOPIC SIGMOIDECTOMY N/A 1/5/2023    Procedure: COLECTOMY, SIGMOID, LAPAROSCOPIC;  Surgeon: Prasad Alonso MD;  Location: [facility] OR;  Service: General;  Laterality: N/A;    MOBILIZATION OF SPLENIC FLEXURE N/A 1/5/2023    Procedure: MOBILIZATION, SPLENIC FLEXURE;  Surgeon: Prasad Alonso MD;  Location: [facility] OR;  Service: General;  Laterality: N/A;       HEENT HEENT WDL: WDL   Cognitive/ Neuro Cognitive/Neuro/Behavioral WDL: WDL  Level of Consciousness (AVPU): alert  Orientation: oriented x 4  [place] Coma Scale Score: 15  Additional Documentation: [place] Coma Scale (Group)   Resp Respiratory WDL: WDL     Flow (L/min): 2  Oxygen Concentration (%): 21      Cardiac Cardiac WDL: WDL  Lead Monitored: Lead II  Rhythm: normal sinus rhythm     Cardiac/Telemetry Box Number: no order   Peripheral/ Neurovascular Peripheral Neurovascular WDL: WDL   VTE core VTE Required Core Measure: (SCDs) Sequential compression device initiated/maintained   GI GI WDL: WDL except, appearance/characteristics, GI symptoms  All Quadrants Bowel Sounds: hypoactive  Last Bowel Movement: 01/03/23   Diet Diet clear liquid    Genitourinary WDL: WDL except, voiding  ability/characteristics  Voiding Characteristics: due to void  Urine Characteristics: clear yellow   Skin Skin WDL: WDL except, all  Skin Integrity: incision (ABD site x3)   Ralf Score Ralf Score: 23   Musculoskeletal  Musculoskeletal WDL: WDL      Safety Safety WDL: WDL  Safety Factors: ID band on, upper side rails raised x 2, call light in reach, wheels locked, bed in low position      Fall Risk Score Fall Risk Score: 3   LDA(s) Lines/Drains/Airways       Peripheral Intravenous Line  Duration                  Peripheral IV - Single Lumen 01/03/23 1145 20 G Right Antecubital 3 days                   Consults       Shift events  Sufficiently voided post ramos removal   Plan of Care for RN report  Continue care as ordered, IVF, pain management, iv antibx   Discharge Planning Discharge Plan A: Home    Patient/ family updated on plan of care, all questions answered up to now regarding plan of care, will continue to monitor per hourly rounding & unit practice/ policy    Note: Other exception details noted in flowsheet if not present in summary

## 2023-01-07 NOTE — SUBJECTIVE & OBJECTIVE
Interval History: patient reports bloody bowel movement with gas pains, tolerating mild amount of liquid, ambulating    Medications:  Continuous Infusions:   lactated ringers Stopped (01/07/23 0517)     Scheduled Meds:   piperacillin-tazobactam (ZOSYN) IVPB  4.5 g Intravenous Q8H    polyethylene glycol  17 g Oral BID     PRN Meds:sodium chloride 0.9%, sodium chloride 0.9%, acetaminophen, HYDROmorphone, LIDOcaine (PF) 10 mg/ml (1%), melatonin, ondansetron, oxyCODONE, oxyCODONE, promethazine (PHENERGAN) IVPB, sodium chloride 0.9%     Review of patient's allergies indicates:   Allergen Reactions    Morphine Nausea And Vomiting and Other (See Comments)     migraine     Objective:     Vital Signs (Most Recent):  Temp: 98.3 °F (36.8 °C) (01/07/23 1203)  Pulse: 81 (01/07/23 1203)  Resp: 16 (01/07/23 1233)  BP: 123/71 (01/07/23 1203)  SpO2: 99 % (01/07/23 1203) Vital Signs (24h Range):  Temp:  [98 °F (36.7 °C)-99.2 °F (37.3 °C)] 98.3 °F (36.8 °C)  Pulse:  [] 81  Resp:  [14-18] 16  SpO2:  [93 %-99 %] 99 %  BP: (115-137)/(62-73) 123/71     Weight: 95.1 kg (209 lb 10.5 oz)  Body mass index is 26.21 kg/m².    Intake/Output - Last 3 Shifts         01/05 0700  01/06 0659 01/06 0700 01/07 0659 01/07 0700 01/08 0659    P.O.  358     I.V. (mL/kg)  1812.6 (19.1) 820 (8.6)    IV Piggyback 1000 869.7 236.6    Total Intake(mL/kg) 1000 (10.5) 3040.3 (32) 1056.7 (11.1)    Urine (mL/kg/hr) 200 (0.1) 250 (0.1) 225 (0.3)    Stool 0      Total Output 200 250 225    Net +800 +2790.3 +831.7           Stool Occurrence 1 x              Physical Exam  Vitals and nursing note reviewed.   Constitutional:       General: He is not in acute distress.     Appearance: He is well-developed and normal weight. He is not ill-appearing.   HENT:      Head: Normocephalic and atraumatic.      Right Ear: External ear normal.      Left Ear: External ear normal.      Nose: Nose normal.   Eyes:      Conjunctiva/sclera: Conjunctivae normal.   Neck:       Thyroid: No thyromegaly.   Cardiovascular:      Rate and Rhythm: Normal rate.   Pulmonary:      Effort: Pulmonary effort is normal. No respiratory distress.   Abdominal:      General: Abdomen is flat. There is no distension.      Palpations: Abdomen is soft.      Tenderness: There is abdominal tenderness (tender surrounding abdominal incisions).      Comments: Surgical sites clean and dry without signs of infection.    Musculoskeletal:         General: No tenderness. Normal range of motion.      Cervical back: Normal range of motion.   Skin:     General: Skin is warm and dry.      Capillary Refill: Capillary refill takes less than 2 seconds.      Findings: No rash.   Neurological:      Mental Status: He is alert and oriented to person, place, and time.       Significant Labs:  I have reviewed all pertinent lab results within the past 24 hours.  CBC:   Recent Labs   Lab 01/07/23  0436   WBC 12.51   RBC 4.64   HGB 13.8*   HCT 40.0      MCV 86   MCH 29.7   MCHC 34.5       BMP:   Recent Labs   Lab 01/07/23  0436   GLU 90      K 4.0   CL 98   CO2 28   BUN 8   CREATININE 1.0   CALCIUM 10.4   MG 1.7       Significant Diagnostics:  I have reviewed all pertinent imaging results/findings within the past 24 hours.

## 2023-01-07 NOTE — ASSESSMENT & PLAN NOTE
31yo M with acute sigmoid diverticulitis s/p laparoscopic sigmoidectomy on 01/05/23    - clear liquids will advance diet as tolerated  - encouraged OOB, ambulation  - ERAS protocol  - IVF  - IV abx  - continue prn pain control  - pathology pending  - incentive spirometry

## 2023-01-07 NOTE — NURSING
Received in bed watching tv, awake and alert, resp even and unlabored, assessment per flowsheet. C/O abdominal pain 6/10, already received pain medication. Bed low, call light within reach. Will continue to monitor.

## 2023-01-07 NOTE — PROGRESS NOTES
[facility]  General Surgery  Progress Note    Subjective:     History of Present Illness:  32 y.o. male presents with previous complicated diverticulitis requiring hospitalization in October 2022 treated nonoperatively who represents to the ED with a one day history of lower abdominal pain, mostly suprapubic. Patient has had intermittent abdominal pain since his original diagnosis in October that has been intermittent, as well as intermittently treated by ED with PO abx, most recently Augmentin on 12/20/22. Denies current fever, chills, nausea, vomiting, hematochezia, melena. Never had colonoscopy. No family history of CRC, polpys, IBD. No previous abdominal surgeries. Workup in ED positive for leukocytosis of 13k and CT scan concerning for sigmoid diverticulitis. CRS consulted for evaluation and admission.      Post-Op Info:  Procedure(s) (LRB):  COLECTOMY, SIGMOID, LAPAROSCOPIC (N/A)  BLOCK, TRANSVERSUS ABDOMINIS PLANE (N/A)  SIGMOIDOSCOPY, FLEXIBLE (N/A)  MOBILIZATION, SPLENIC FLEXURE (N/A)   2 Days Post-Op     Interval History: patient reports bloody bowel movement with gas pains, tolerating mild amount of liquid, ambulating    Medications:  Continuous Infusions:   lactated ringers Stopped (01/07/23 0517)     Scheduled Meds:   piperacillin-tazobactam (ZOSYN) IVPB  4.5 g Intravenous Q8H    polyethylene glycol  17 g Oral BID     PRN Meds:sodium chloride 0.9%, sodium chloride 0.9%, acetaminophen, HYDROmorphone, LIDOcaine (PF) 10 mg/ml (1%), melatonin, ondansetron, oxyCODONE, oxyCODONE, promethazine (PHENERGAN) IVPB, sodium chloride 0.9%     Review of patient's allergies indicates:   Allergen Reactions    Morphine Nausea And Vomiting and Other (See Comments)     migraine     Objective:     Vital Signs (Most Recent):  Temp: 98.3 °F (36.8 °C) (01/07/23 1203)  Pulse: 81 (01/07/23 1203)  Resp: 16 (01/07/23 1233)  BP: 123/71 (01/07/23 1203)  SpO2: 99 % (01/07/23 1203) Vital Signs (24h  Range):  Temp:  [98 °F (36.7 °C)-99.2 °F (37.3 °C)] 98.3 °F (36.8 °C)  Pulse:  [] 81  Resp:  [14-18] 16  SpO2:  [93 %-99 %] 99 %  BP: (115-137)/(62-73) 123/71     Weight: 95.1 kg (209 lb 10.5 oz)  Body mass index is 26.21 kg/m².    Intake/Output - Last 3 Shifts         01/05 0700 01/06 0659 01/06 0700 01/07 0659 01/07 0700 01/08 0659    P.O.  358     I.V. (mL/kg)  1812.6 (19.1) 820 (8.6)    IV Piggyback 1000 869.7 236.6    Total Intake(mL/kg) 1000 (10.5) 3040.3 (32) 1056.7 (11.1)    Urine (mL/kg/hr) 200 (0.1) 250 (0.1) 225 (0.3)    Stool 0      Total Output 200 250 225    Net +800 +2790.3 +831.7           Stool Occurrence 1 x              Physical Exam  Vitals and nursing note reviewed.   Constitutional:       General: He is not in acute distress.     Appearance: He is well-developed and normal weight. He is not ill-appearing.   HENT:      Head: Normocephalic and atraumatic.      Right Ear: External ear normal.      Left Ear: External ear normal.      Nose: Nose normal.   Eyes:      Conjunctiva/sclera: Conjunctivae normal.   Neck:      Thyroid: No thyromegaly.   Cardiovascular:      Rate and Rhythm: Normal rate.   Pulmonary:      Effort: Pulmonary effort is normal. No respiratory distress.   Abdominal:      General: Abdomen is flat. There is no distension.      Palpations: Abdomen is soft.      Tenderness: There is abdominal tenderness (tender surrounding abdominal incisions).      Comments: Surgical sites clean and dry without signs of infection.    Musculoskeletal:         General: No tenderness. Normal range of motion.      Cervical back: Normal range of motion.   Skin:     General: Skin is warm and dry.      Capillary Refill: Capillary refill takes less than 2 seconds.      Findings: No rash.   Neurological:      Mental Status: He is alert and oriented to person, place, and time.       Significant Labs:  I have reviewed all pertinent lab results within the past 24 hours.  CBC:   Recent Labs   Lab  01/07/23  0436   WBC 12.51   RBC 4.64   HGB 13.8*   HCT 40.0      MCV 86   MCH 29.7   MCHC 34.5       BMP:   Recent Labs   Lab 01/07/23 0436   GLU 90      K 4.0   CL 98   CO2 28   BUN 8   CREATININE 1.0   CALCIUM 10.4   MG 1.7       Significant Diagnostics:  I have reviewed all pertinent imaging results/findings within the past 24 hours.    Assessment/Plan:     * Acute diverticulitis  31yo M with acute sigmoid diverticulitis s/p laparoscopic sigmoidectomy on 01/05/23    - clear liquids will advance diet as tolerated  - encouraged OOB, ambulation  - ERAS protocol  - IVF  - IV abx  - continue prn pain control  - pathology pending  - incentive spirometry     Depression  Stable, monitor, continue appropriate home meds        Delfin Acuna MD  General Surgery  [facility] - Telemetry ([facility])

## 2023-01-07 NOTE — PLAN OF CARE
Plan of Care: RN Shift Summary & Bedside Handover Report  01/07/2023 3:50 PM    Pt admitted on 1/3/2023 for Acute diverticulitis under Prasad Alonso MD service   Code Status Full Code    Past Medical/ Surgical Hx Past Medical History:   Diagnosis Date    Diverticulitis     Diverticulitis       Past Surgical History:   Procedure Laterality Date    FLEXIBLE SIGMOIDOSCOPY N/A 1/5/2023    Procedure: SIGMOIDOSCOPY, FLEXIBLE;  Surgeon: Prasad Alonso MD;  Location: [facility] OR;  Service: General;  Laterality: N/A;    INJECTION OF ANESTHETIC AGENT INTO TISSUE PLANE DEFINED BY TRANSVERSUS ABDOMINIS MUSCLE N/A 1/5/2023    Procedure: BLOCK, TRANSVERSUS ABDOMINIS PLANE;  Surgeon: Prasad Alonso MD;  Location: [facility] OR;  Service: General;  Laterality: N/A;    LAPAROSCOPIC SIGMOIDECTOMY N/A 1/5/2023    Procedure: COLECTOMY, SIGMOID, LAPAROSCOPIC;  Surgeon: Prasad Alonso MD;  Location: [facility] OR;  Service: General;  Laterality: N/A;    MOBILIZATION OF SPLENIC FLEXURE N/A 1/5/2023    Procedure: MOBILIZATION, SPLENIC FLEXURE;  Surgeon: Prasad Alonso MD;  Location: [facility] OR;  Service: General;  Laterality: N/A;       HEENT HEENT WDL: WDL   Cognitive/ Neuro Cognitive/Neuro/Behavioral WDL: WDL  Level of Consciousness (AVPU): alert  Orientation: oriented x 4  [place] Coma Scale Score: 15  Additional Documentation: [place] Coma Scale (Group)   Resp Respiratory WDL: WDL     Flow (L/min): 2  Oxygen Concentration (%): 21      Cardiac Cardiac WDL: WDL  Lead Monitored: Lead II  Rhythm: normal sinus rhythm     Cardiac/Telemetry Box Number: no order   Peripheral/ Neurovascular Peripheral Neurovascular WDL: WDL   VTE core VTE Required Core Measure: (SCDs) Sequential compression device initiated/maintained   GI GI WDL: WDL except, appearance/characteristics (incision w/ dermabond noted)  All Quadrants Bowel Sounds: hypoactive  Last Bowel Movement: 01/06/23 (passing what appear to look orange mucus)   Diet Diet Adult Regular  (IDDSI Level 7)    Genitourinary WDL: WDL  Voiding Characteristics: voids spontaneously without difficulty  Urine Characteristics: clear yellow   Skin Skin WDL: WDL except, all  Skin Integrity: incision (Abd incision w/ dermabond noted)   Ralf Score Ralf Score: 20   Musculoskeletal  Musculoskeletal WDL: WDL      Safety Safety WDL: WDL  Safety Factors: ID band on, upper side rails raised x 2, call light in reach, wheels locked, bed in low position      Fall Risk Score Fall Risk Score: 3   LDA(s) Lines/Drains/Airways       Peripheral Intravenous Line  Duration                  Peripheral IV - Single Lumen 01/03/23 1145 20 G Right Antecubital 4 days                   Consults       Shift events Advanced diet per order - Regular   Plan of Care for RN report  Continue care as ordered, pain management, IVF, IV antibx   Discharge Planning Discharge Plan A: Home    Patient/ family updated on plan of care, all questions answered up to now regarding plan of care, will continue to monitor per hourly rounding & unit practice/ policy    Note: Other exception details noted in flowsheet if not present in summary

## 2023-01-08 LAB
ANION GAP SERPL CALC-SCNC: 12 MMOL/L (ref 8–16)
BASOPHILS # BLD AUTO: 0.07 K/UL (ref 0–0.2)
BASOPHILS NFR BLD: 0.8 % (ref 0–1.9)
BUN SERPL-MCNC: 7 MG/DL (ref 6–20)
CALCIUM SERPL-MCNC: 9.8 MG/DL (ref 8.7–10.5)
CHLORIDE SERPL-SCNC: 100 MMOL/L (ref 95–110)
CO2 SERPL-SCNC: 28 MMOL/L (ref 23–29)
CREAT SERPL-MCNC: 0.9 MG/DL (ref 0.5–1.4)
DIFFERENTIAL METHOD: ABNORMAL
EOSINOPHIL # BLD AUTO: 0.3 K/UL (ref 0–0.5)
EOSINOPHIL NFR BLD: 3.7 % (ref 0–8)
ERYTHROCYTE [DISTWIDTH] IN BLOOD BY AUTOMATED COUNT: 11.7 % (ref 11.5–14.5)
EST. GFR  (NO RACE VARIABLE): >60 ML/MIN/1.73 M^2
GLUCOSE SERPL-MCNC: 76 MG/DL (ref 70–110)
HCT VFR BLD AUTO: 36.4 % (ref 40–54)
HGB BLD-MCNC: 12.4 G/DL (ref 14–18)
IMM GRANULOCYTES # BLD AUTO: 0.02 K/UL (ref 0–0.04)
IMM GRANULOCYTES NFR BLD AUTO: 0.2 % (ref 0–0.5)
LYMPHOCYTES # BLD AUTO: 3 K/UL (ref 1–4.8)
LYMPHOCYTES NFR BLD: 33.1 % (ref 18–48)
MAGNESIUM SERPL-MCNC: 1.7 MG/DL (ref 1.6–2.6)
MCH RBC QN AUTO: 29.2 PG (ref 27–31)
MCHC RBC AUTO-ENTMCNC: 34.1 G/DL (ref 32–36)
MCV RBC AUTO: 86 FL (ref 82–98)
MONOCYTES # BLD AUTO: 0.7 K/UL (ref 0.3–1)
MONOCYTES NFR BLD: 7.5 % (ref 4–15)
NEUTROPHILS # BLD AUTO: 4.9 K/UL (ref 1.8–7.7)
NEUTROPHILS NFR BLD: 54.7 % (ref 38–73)
NRBC BLD-RTO: 0 /100 WBC
PHOSPHATE SERPL-MCNC: 3.9 MG/DL (ref 2.7–4.5)
PLATELET # BLD AUTO: 356 K/UL (ref 150–450)
PMV BLD AUTO: 9.9 FL (ref 9.2–12.9)
POTASSIUM SERPL-SCNC: 4.2 MMOL/L (ref 3.5–5.1)
RBC # BLD AUTO: 4.24 M/UL (ref 4.6–6.2)
SODIUM SERPL-SCNC: 140 MMOL/L (ref 136–145)
WBC # BLD AUTO: 8.93 K/UL (ref 3.9–12.7)

## 2023-01-08 PROCEDURE — 25000003 PHARM REV CODE 250: Performed by: SURGERY

## 2023-01-08 PROCEDURE — 85025 COMPLETE CBC W/AUTO DIFF WBC: CPT | Performed by: COLON & RECTAL SURGERY

## 2023-01-08 PROCEDURE — 80048 BASIC METABOLIC PNL TOTAL CA: CPT | Performed by: COLON & RECTAL SURGERY

## 2023-01-08 PROCEDURE — 99900035 HC TECH TIME PER 15 MIN (STAT)

## 2023-01-08 PROCEDURE — 25000003 PHARM REV CODE 250: Performed by: COLON & RECTAL SURGERY

## 2023-01-08 PROCEDURE — 94761 N-INVAS EAR/PLS OXIMETRY MLT: CPT

## 2023-01-08 PROCEDURE — 83735 ASSAY OF MAGNESIUM: CPT | Performed by: COLON & RECTAL SURGERY

## 2023-01-08 PROCEDURE — 63600175 PHARM REV CODE 636 W HCPCS: Performed by: COLON & RECTAL SURGERY

## 2023-01-08 PROCEDURE — 36415 COLL VENOUS BLD VENIPUNCTURE: CPT | Performed by: COLON & RECTAL SURGERY

## 2023-01-08 PROCEDURE — 21400001 HC TELEMETRY ROOM

## 2023-01-08 PROCEDURE — 84100 ASSAY OF PHOSPHORUS: CPT | Performed by: COLON & RECTAL SURGERY

## 2023-01-08 RX ORDER — CIPROFLOXACIN 750 MG/1
750 TABLET, FILM COATED ORAL EVERY 12 HOURS
Status: DISCONTINUED | OUTPATIENT
Start: 2023-01-08 | End: 2023-01-09 | Stop reason: HOSPADM

## 2023-01-08 RX ORDER — METRONIDAZOLE 500 MG/1
500 TABLET ORAL EVERY 8 HOURS
Status: DISCONTINUED | OUTPATIENT
Start: 2023-01-08 | End: 2023-01-09 | Stop reason: HOSPADM

## 2023-01-08 RX ORDER — CIPROFLOXACIN 500 MG/1
500 TABLET ORAL EVERY 12 HOURS
Status: DISCONTINUED | OUTPATIENT
Start: 2023-01-08 | End: 2023-01-08

## 2023-01-08 RX ADMIN — CIPROFLOXACIN 750 MG: 750 TABLET, FILM COATED ORAL at 08:01

## 2023-01-08 RX ADMIN — OXYCODONE HYDROCHLORIDE 10 MG: 5 TABLET ORAL at 08:01

## 2023-01-08 RX ADMIN — OXYCODONE HYDROCHLORIDE 10 MG: 5 TABLET ORAL at 02:01

## 2023-01-08 RX ADMIN — OXYCODONE HYDROCHLORIDE 10 MG: 5 TABLET ORAL at 11:01

## 2023-01-08 RX ADMIN — OXYCODONE HYDROCHLORIDE 10 MG: 5 TABLET ORAL at 07:01

## 2023-01-08 RX ADMIN — METRONIDAZOLE 500 MG: 500 TABLET ORAL at 02:01

## 2023-01-08 RX ADMIN — POLYETHYLENE GLYCOL 3350 17 G: 17 POWDER, FOR SOLUTION ORAL at 08:01

## 2023-01-08 RX ADMIN — METRONIDAZOLE 500 MG: 500 TABLET ORAL at 09:01

## 2023-01-08 RX ADMIN — PIPERACILLIN SODIUM AND TAZOBACTAM SODIUM 4.5 G: 4; .5 INJECTION, POWDER, LYOPHILIZED, FOR SOLUTION INTRAVENOUS at 05:01

## 2023-01-08 RX ADMIN — CIPROFLOXACIN 750 MG: 750 TABLET, FILM COATED ORAL at 11:01

## 2023-01-08 RX ADMIN — SODIUM CHLORIDE, POTASSIUM CHLORIDE, SODIUM LACTATE AND CALCIUM CHLORIDE: 600; 310; 30; 20 INJECTION, SOLUTION INTRAVENOUS at 02:01

## 2023-01-08 RX ADMIN — OXYCODONE HYDROCHLORIDE 10 MG: 5 TABLET ORAL at 04:01

## 2023-01-08 NOTE — SUBJECTIVE & OBJECTIVE
Interval History: patient tolerating diet without nausea but still having moderate gas pains when eating, passing flatus and bowel movement, ambulating in room    Medications:  Continuous Infusions:   lactated ringers 100 mL/hr at 01/08/23 1015     Scheduled Meds:   piperacillin-tazobactam (ZOSYN) IVPB  4.5 g Intravenous Q8H    polyethylene glycol  17 g Oral BID     PRN Meds:sodium chloride 0.9%, sodium chloride 0.9%, acetaminophen, HYDROmorphone, LIDOcaine (PF) 10 mg/ml (1%), melatonin, ondansetron, oxyCODONE, oxyCODONE, promethazine (PHENERGAN) IVPB, sodium chloride 0.9%     Review of patient's allergies indicates:   Allergen Reactions    Morphine Nausea And Vomiting and Other (See Comments)     migraine     Objective:     Vital Signs (Most Recent):  Temp: 98.6 °F (37 °C) (01/08/23 0715)  Pulse: 86 (01/08/23 0816)  Resp: 18 (01/08/23 0816)  BP: 128/73 (01/08/23 0715)  SpO2: 97 % (01/08/23 0816) Vital Signs (24h Range):  Temp:  [98 °F (36.7 °C)-98.6 °F (37 °C)] 98.6 °F (37 °C)  Pulse:  [80-98] 86  Resp:  [14-18] 18  SpO2:  [97 %-99 %] 97 %  BP: (113-152)/(62-87) 128/73     Weight: 95.1 kg (209 lb 10.5 oz)  Body mass index is 26.21 kg/m².    Intake/Output - Last 3 Shifts         01/06 0700  01/07 0659 01/07 0700 01/08 0659 01/08 0700 01/09 0659    P.O. 358  118    I.V. (mL/kg) 1812.6 (19.1) 1024.9 (10.8) 1483.2 (15.6)    IV Piggyback 869.7 317.8 217.8    Total Intake(mL/kg) 3040.3 (32) 1342.7 (14.1) 1818.9 (19.1)    Urine (mL/kg/hr) 250 (0.1) 225 (0.1)     Stool       Total Output 250 225     Net +2790.3 +1117.7 +1818.9                   Physical Exam  Vitals and nursing note reviewed.   Constitutional:       General: He is not in acute distress.     Appearance: He is well-developed and normal weight. He is not ill-appearing.   HENT:      Head: Normocephalic and atraumatic.      Right Ear: External ear normal.      Left Ear: External ear normal.      Nose: Nose normal.   Eyes:      Conjunctiva/sclera: Conjunctivae  normal.   Neck:      Thyroid: No thyromegaly.   Cardiovascular:      Rate and Rhythm: Normal rate.   Pulmonary:      Effort: Pulmonary effort is normal. No respiratory distress.   Abdominal:      General: Abdomen is flat. There is no distension.      Palpations: Abdomen is soft.      Tenderness: There is abdominal tenderness (tender surrounding abdominal incisions).      Comments: Surgical sites clean and dry without signs of infection.    Musculoskeletal:         General: No tenderness. Normal range of motion.      Cervical back: Normal range of motion.   Skin:     General: Skin is warm and dry.      Capillary Refill: Capillary refill takes less than 2 seconds.      Findings: No rash.   Neurological:      Mental Status: He is alert and oriented to person, place, and time.       Significant Labs:  I have reviewed all pertinent lab results within the past 24 hours.  CBC:   Recent Labs   Lab 01/08/23  0447   WBC 8.93   RBC 4.24*   HGB 12.4*   HCT 36.4*      MCV 86   MCH 29.2   MCHC 34.1       BMP:   Recent Labs   Lab 01/08/23 0447   GLU 76      K 4.2      CO2 28   BUN 7   CREATININE 0.9   CALCIUM 9.8   MG 1.7       Significant Diagnostics:  I have reviewed all pertinent imaging results/findings within the past 24 hours.

## 2023-01-08 NOTE — ASSESSMENT & PLAN NOTE
33yo M with acute sigmoid diverticulitis s/p laparoscopic sigmoidectomy on 01/05/23    - regular diet  - encouraged OOB, ambulation  - p.o. abx  - continue prn pain control  - pathology pending  - incentive spirometry

## 2023-01-08 NOTE — PLAN OF CARE
Problem: Adult Inpatient Plan of Care  Goal: Optimal Comfort and Wellbeing  Outcome: Ongoing, Progressing     Problem: Infection  Goal: Absence of Infection Signs and Symptoms  Outcome: Ongoing, Progressing     Problem: Fall Injury Risk  Goal: Absence of Fall and Fall-Related Injury  Outcome: Ongoing, Progressing

## 2023-01-08 NOTE — PROGRESS NOTES
Pharmacist Renal Dose Adjustment Note    David Sevilla is a 32 y.o. male being treated with the medication Ciprofloxacin.    Patient Data:    Vital Signs (Most Recent):  Temp: 98.6 °F (37 °C) (01/08/23 0715)  Pulse: 86 (01/08/23 0816)  Resp: 18 (01/08/23 0816)  BP: 128/73 (01/08/23 0715)  SpO2: 97 % (01/08/23 0816) Vital Signs (72h Range):  Temp:  [97.8 °F (36.6 °C)-99.4 °F (37.4 °C)]   Pulse:  []   Resp:  [12-30]   BP: (109-152)/(44-87)   SpO2:  [93 %-100 %]      Recent Labs   Lab 01/06/23  0441 01/07/23  0436 01/08/23 0447   CREATININE 1.0 1.0 0.9     Serum creatinine: 0.9 mg/dL 01/08/23 0447  Estimated creatinine clearance: 140.8 mL/min    Medication:Ciprofloxacin 500 mg every 12 hours will be changed to Ciprofloxacin 750 mg every 12 hours per pharmacy protocol.    Pharmacist's Name: Hai Torres  Pharmacist's Extension: [phone]

## 2023-01-08 NOTE — PROGRESS NOTES
[facility]  General Surgery  Progress Note    Subjective:     History of Present Illness:  32 y.o. male presents with previous complicated diverticulitis requiring hospitalization in October 2022 treated nonoperatively who represents to the ED with a one day history of lower abdominal pain, mostly suprapubic. Patient has had intermittent abdominal pain since his original diagnosis in October that has been intermittent, as well as intermittently treated by ED with PO abx, most recently Augmentin on 12/20/22. Denies current fever, chills, nausea, vomiting, hematochezia, melena. Never had colonoscopy. No family history of CRC, polpys, IBD. No previous abdominal surgeries. Workup in ED positive for leukocytosis of 13k and CT scan concerning for sigmoid diverticulitis. CRS consulted for evaluation and admission.      Post-Op Info:  Procedure(s) (LRB):  COLECTOMY, SIGMOID, LAPAROSCOPIC (N/A)  BLOCK, TRANSVERSUS ABDOMINIS PLANE (N/A)  SIGMOIDOSCOPY, FLEXIBLE (N/A)  MOBILIZATION, SPLENIC FLEXURE (N/A)   3 Days Post-Op     Interval History: patient tolerating diet without nausea but still having moderate gas pains when eating, passing flatus and bowel movement, ambulating in room    Medications:  Continuous Infusions:   lactated ringers 100 mL/hr at 01/08/23 1015     Scheduled Meds:   piperacillin-tazobactam (ZOSYN) IVPB  4.5 g Intravenous Q8H    polyethylene glycol  17 g Oral BID     PRN Meds:sodium chloride 0.9%, sodium chloride 0.9%, acetaminophen, HYDROmorphone, LIDOcaine (PF) 10 mg/ml (1%), melatonin, ondansetron, oxyCODONE, oxyCODONE, promethazine (PHENERGAN) IVPB, sodium chloride 0.9%     Review of patient's allergies indicates:   Allergen Reactions    Morphine Nausea And Vomiting and Other (See Comments)     migraine     Objective:     Vital Signs (Most Recent):  Temp: 98.6 °F (37 °C) (01/08/23 0715)  Pulse: 86 (01/08/23 0816)  Resp: 18 (01/08/23 0816)  BP: 128/73 (01/08/23 0715)  SpO2: 97 %  (01/08/23 0816) Vital Signs (24h Range):  Temp:  [98 °F (36.7 °C)-98.6 °F (37 °C)] 98.6 °F (37 °C)  Pulse:  [80-98] 86  Resp:  [14-18] 18  SpO2:  [97 %-99 %] 97 %  BP: (113-152)/(62-87) 128/73     Weight: 95.1 kg (209 lb 10.5 oz)  Body mass index is 26.21 kg/m².    Intake/Output - Last 3 Shifts         01/06 0700  01/07 0659 01/07 0700 01/08 0659 01/08 0700 01/09 0659    P.O. 358  118    I.V. (mL/kg) 1812.6 (19.1) 1024.9 (10.8) 1483.2 (15.6)    IV Piggyback 869.7 317.8 217.8    Total Intake(mL/kg) 3040.3 (32) 1342.7 (14.1) 1818.9 (19.1)    Urine (mL/kg/hr) 250 (0.1) 225 (0.1)     Stool       Total Output 250 225     Net +2790.3 +1117.7 +1818.9                   Physical Exam  Vitals and nursing note reviewed.   Constitutional:       General: He is not in acute distress.     Appearance: He is well-developed and normal weight. He is not ill-appearing.   HENT:      Head: Normocephalic and atraumatic.      Right Ear: External ear normal.      Left Ear: External ear normal.      Nose: Nose normal.   Eyes:      Conjunctiva/sclera: Conjunctivae normal.   Neck:      Thyroid: No thyromegaly.   Cardiovascular:      Rate and Rhythm: Normal rate.   Pulmonary:      Effort: Pulmonary effort is normal. No respiratory distress.   Abdominal:      General: Abdomen is flat. There is no distension.      Palpations: Abdomen is soft.      Tenderness: There is abdominal tenderness (tender surrounding abdominal incisions).      Comments: Surgical sites clean and dry without signs of infection.    Musculoskeletal:         General: No tenderness. Normal range of motion.      Cervical back: Normal range of motion.   Skin:     General: Skin is warm and dry.      Capillary Refill: Capillary refill takes less than 2 seconds.      Findings: No rash.   Neurological:      Mental Status: He is alert and oriented to person, place, and time.       Significant Labs:  I have reviewed all pertinent lab results within the past 24 hours.  CBC:   Recent  Labs   Lab 01/08/23  0447   WBC 8.93   RBC 4.24*   HGB 12.4*   HCT 36.4*      MCV 86   MCH 29.2   MCHC 34.1       BMP:   Recent Labs   Lab 01/08/23  0447   GLU 76      K 4.2      CO2 28   BUN 7   CREATININE 0.9   CALCIUM 9.8   MG 1.7       Significant Diagnostics:  I have reviewed all pertinent imaging results/findings within the past 24 hours.    Assessment/Plan:     * Acute diverticulitis  33yo M with acute sigmoid diverticulitis s/p laparoscopic sigmoidectomy on 01/05/23    - regular diet  - encouraged OOB, ambulation  - p.o. abx  - continue prn pain control  - pathology pending  - incentive spirometry     Depression  Stable, monitor, continue appropriate home meds        Delfin Acuna MD  General Surgery  [facility] ([facility])

## 2023-01-09 VITALS
RESPIRATION RATE: 20 BRPM | BODY MASS INDEX: 26.07 KG/M2 | WEIGHT: 209.69 LBS | HEIGHT: 75 IN | TEMPERATURE: 98 F | SYSTOLIC BLOOD PRESSURE: 141 MMHG | HEART RATE: 104 BPM | DIASTOLIC BLOOD PRESSURE: 81 MMHG | OXYGEN SATURATION: 96 %

## 2023-01-09 PROBLEM — K57.92 ACUTE DIVERTICULITIS: Status: RESOLVED | Noted: 2022-10-20 | Resolved: 2023-01-09

## 2023-01-09 LAB
ANION GAP SERPL CALC-SCNC: 13 MMOL/L (ref 8–16)
BASOPHILS # BLD AUTO: 0.07 K/UL (ref 0–0.2)
BASOPHILS NFR BLD: 0.9 % (ref 0–1.9)
BUN SERPL-MCNC: 9 MG/DL (ref 6–20)
CALCIUM SERPL-MCNC: 10.3 MG/DL (ref 8.7–10.5)
CHLORIDE SERPL-SCNC: 101 MMOL/L (ref 95–110)
CO2 SERPL-SCNC: 27 MMOL/L (ref 23–29)
CREAT SERPL-MCNC: 0.9 MG/DL (ref 0.5–1.4)
DIFFERENTIAL METHOD: ABNORMAL
EOSINOPHIL # BLD AUTO: 0.4 K/UL (ref 0–0.5)
EOSINOPHIL NFR BLD: 4.9 % (ref 0–8)
ERYTHROCYTE [DISTWIDTH] IN BLOOD BY AUTOMATED COUNT: 11.6 % (ref 11.5–14.5)
EST. GFR  (NO RACE VARIABLE): >60 ML/MIN/1.73 M^2
GLUCOSE SERPL-MCNC: 92 MG/DL (ref 70–110)
HCT VFR BLD AUTO: 40.8 % (ref 40–54)
HGB BLD-MCNC: 13.8 G/DL (ref 14–18)
IMM GRANULOCYTES # BLD AUTO: 0.02 K/UL (ref 0–0.04)
IMM GRANULOCYTES NFR BLD AUTO: 0.2 % (ref 0–0.5)
LYMPHOCYTES # BLD AUTO: 2.8 K/UL (ref 1–4.8)
LYMPHOCYTES NFR BLD: 34.8 % (ref 18–48)
MAGNESIUM SERPL-MCNC: 1.9 MG/DL (ref 1.6–2.6)
MCH RBC QN AUTO: 28.9 PG (ref 27–31)
MCHC RBC AUTO-ENTMCNC: 33.8 G/DL (ref 32–36)
MCV RBC AUTO: 85 FL (ref 82–98)
MONOCYTES # BLD AUTO: 0.6 K/UL (ref 0.3–1)
MONOCYTES NFR BLD: 7.3 % (ref 4–15)
NEUTROPHILS # BLD AUTO: 4.2 K/UL (ref 1.8–7.7)
NEUTROPHILS NFR BLD: 51.9 % (ref 38–73)
NRBC BLD-RTO: 0 /100 WBC
PHOSPHATE SERPL-MCNC: 3.5 MG/DL (ref 2.7–4.5)
PLATELET # BLD AUTO: 431 K/UL (ref 150–450)
PMV BLD AUTO: 9.6 FL (ref 9.2–12.9)
POTASSIUM SERPL-SCNC: 3.6 MMOL/L (ref 3.5–5.1)
RBC # BLD AUTO: 4.78 M/UL (ref 4.6–6.2)
SODIUM SERPL-SCNC: 141 MMOL/L (ref 136–145)
WBC # BLD AUTO: 8.11 K/UL (ref 3.9–12.7)

## 2023-01-09 PROCEDURE — 85025 COMPLETE CBC W/AUTO DIFF WBC: CPT | Performed by: COLON & RECTAL SURGERY

## 2023-01-09 PROCEDURE — 36415 COLL VENOUS BLD VENIPUNCTURE: CPT | Performed by: COLON & RECTAL SURGERY

## 2023-01-09 PROCEDURE — 63600175 PHARM REV CODE 636 W HCPCS: Performed by: COLON & RECTAL SURGERY

## 2023-01-09 PROCEDURE — 25000003 PHARM REV CODE 250: Performed by: NURSE PRACTITIONER

## 2023-01-09 PROCEDURE — 25000003 PHARM REV CODE 250: Performed by: SURGERY

## 2023-01-09 PROCEDURE — 80048 BASIC METABOLIC PNL TOTAL CA: CPT | Performed by: COLON & RECTAL SURGERY

## 2023-01-09 PROCEDURE — 83735 ASSAY OF MAGNESIUM: CPT | Performed by: COLON & RECTAL SURGERY

## 2023-01-09 PROCEDURE — 84100 ASSAY OF PHOSPHORUS: CPT | Performed by: COLON & RECTAL SURGERY

## 2023-01-09 PROCEDURE — 94761 N-INVAS EAR/PLS OXIMETRY MLT: CPT

## 2023-01-09 PROCEDURE — 99900035 HC TECH TIME PER 15 MIN (STAT)

## 2023-01-09 PROCEDURE — 99024 POSTOP FOLLOW-UP VISIT: CPT | Mod: ,,,

## 2023-01-09 PROCEDURE — 25000003 PHARM REV CODE 250: Performed by: COLON & RECTAL SURGERY

## 2023-01-09 PROCEDURE — 99024 PR POST-OP FOLLOW-UP VISIT: ICD-10-PCS | Mod: ,,,

## 2023-01-09 RX ORDER — HYDROCORTISONE 25 MG/G
CREAM TOPICAL 2 TIMES DAILY
Status: DISCONTINUED | OUTPATIENT
Start: 2023-01-09 | End: 2023-01-09 | Stop reason: HOSPADM

## 2023-01-09 RX ORDER — AMOXICILLIN 250 MG
1 CAPSULE ORAL 2 TIMES DAILY
Qty: 60 TABLET | Refills: 0 | Status: SHIPPED | OUTPATIENT
Start: 2023-01-09

## 2023-01-09 RX ORDER — ONDANSETRON 4 MG/1
4 TABLET, FILM COATED ORAL EVERY 6 HOURS PRN
Qty: 30 TABLET | Refills: 0 | Status: SHIPPED | OUTPATIENT
Start: 2023-01-09

## 2023-01-09 RX ORDER — OXYCODONE HYDROCHLORIDE 5 MG/1
5 TABLET ORAL EVERY 6 HOURS PRN
Qty: 25 TABLET | Refills: 0 | Status: ON HOLD | OUTPATIENT
Start: 2023-01-09 | End: 2023-03-29 | Stop reason: HOSPADM

## 2023-01-09 RX ADMIN — POLYETHYLENE GLYCOL 3350 17 G: 17 POWDER, FOR SOLUTION ORAL at 08:01

## 2023-01-09 RX ADMIN — OXYCODONE HYDROCHLORIDE 10 MG: 5 TABLET ORAL at 12:01

## 2023-01-09 RX ADMIN — OXYCODONE HYDROCHLORIDE 10 MG: 5 TABLET ORAL at 04:01

## 2023-01-09 RX ADMIN — HYDROCORTISONE: 25 CREAM TOPICAL at 01:01

## 2023-01-09 RX ADMIN — HYDROCORTISONE: 25 CREAM TOPICAL at 09:01

## 2023-01-09 RX ADMIN — METRONIDAZOLE 500 MG: 500 TABLET ORAL at 05:01

## 2023-01-09 RX ADMIN — CIPROFLOXACIN 750 MG: 750 TABLET, FILM COATED ORAL at 08:01

## 2023-01-09 RX ADMIN — PROMETHAZINE HYDROCHLORIDE 12.5 MG: 25 INJECTION INTRAMUSCULAR; INTRAVENOUS at 05:01

## 2023-01-09 NOTE — DISCHARGE SUMMARY
[facility] [place] [facility]  Colorectal Surgery  Discharge Summary      Patient Name: David Sevilla  MRN: [id number]  Admission Date: 1/3/2023  Hospital Length of Stay: 6 days  Discharge Date and Time:  01/09/2023 7:47 AM  Attending Physician: Prasad Alonso MD   Discharging Provider: Sharif Rai PA-C  Primary Care Provider: Prasad Alonso MD    HPI:   32 y.o. male presents with previous complicated diverticulitis requiring hospitalization in October 2022 treated nonoperatively who represents to the ED with a one day history of lower abdominal pain, mostly suprapubic. Patient has had intermittent abdominal pain since his original diagnosis in October that has been intermittent, as well as intermittently treated by ED with PO abx, most recently Augmentin on 12/20/22. Denies current fever, chills, nausea, vomiting, hematochezia, melena. Never had colonoscopy. No family history of CRC, polpys, IBD. No previous abdominal surgeries. Workup in ED positive for leukocytosis of 13k and CT scan concerning for sigmoid diverticulitis. CRS consulted for evaluation and admission.      Procedure(s) (LRB):  COLECTOMY, SIGMOID, LAPAROSCOPIC (N/A)  BLOCK, TRANSVERSUS ABDOMINIS PLANE (N/A)  SIGMOIDOSCOPY, FLEXIBLE (N/A)  MOBILIZATION, SPLENIC FLEXURE (N/A)      Indwelling Lines/Drains at time of discharge:   Lines/Drains/Airways     None               Hospital Course: 01/05/2023: No acute events overnight. Still with suprapubic pain. Ready for OR today.    01/06/2023: POD 1. Tolerating clear liquid diet with no nausea or vomiting. Has not ambulated yet. No flatus or BM. Reports soreness surrounding abdominal incisions.       01/07/2023 pod 2 patient reports bloody bowel movement with gas pains, tolerating mild amount of liquid, ambulating    01/08/2023 postop day 3 patient tolerating diet without nausea but still having moderate gas pains when eating, passing flatus and bowel movement, ambulating in room,  regular diet as tolerated, transition to p.o. antibiotics    01/09/2023 - POD 4, tolerating diet, reports some nausea and gas pains. Having bowel movements. Ambulating. Ready for discharge.         Goals of Care Treatment Preferences:  Code Status: Full Code      Consults:     Significant Diagnostic Studies: Labs:   BMP:   Recent Labs   Lab 01/08/23 0447 01/09/23  0430   GLU 76 92    141   K 4.2 3.6    101   CO2 28 27   BUN 7 9   CREATININE 0.9 0.9   CALCIUM 9.8 10.3   MG 1.7 1.9    and CBC   Recent Labs   Lab 01/08/23 0447 01/09/23  0430   WBC 8.93 8.11   HGB 12.4* 13.8*   HCT 36.4* 40.8    431       Pending Diagnostic Studies:     Procedure Component Value Units Date/Time    Specimen to Pathology, Surgery General Surgery [[id number]] Collected: 01/05/23 1232    Order Status: Sent Lab Status: In process Updated: 01/06/23 1029    Specimen: Tissue         Final Active Diagnoses:    Diagnosis Date Noted POA    Depression [F32.A] 10/20/2022 Yes      Problems Resolved During this Admission:    Diagnosis Date Noted Date Resolved POA    PRINCIPAL PROBLEM:  Acute diverticulitis [K57.92] 10/20/2022 01/09/2023 Yes      Discharged Condition: good    Disposition: Home or Self Care    Follow Up:    Patient Instructions:   No discharge procedures on file.  Medications:  Reconciled Home Medications:      Medication List      START taking these medications    ondansetron 4 MG tablet  Commonly known as: ZOFRAN  Take 1 tablet (4 mg total) by mouth every 6 (six) hours as needed for Nausea.     oxyCODONE 5 MG immediate release tablet  Commonly known as: ROXICODONE  Take 1 tablet (5 mg total) by mouth every 6 (six) hours as needed for Pain.     senna-docusate 8.6-50 mg 8.6-50 mg per tablet  Commonly known as: SENNA WITH DOCUSATE SODIUM  Take 1 tablet by mouth 2 (two) times a day. Stop taking if having diarrhea        CONTINUE taking these medications    baclofen 20 MG tablet  Commonly known as: LIORESAL  Take 20  mg by mouth 3 (three) times daily as needed.     HYDROcodone-acetaminophen 7.5-325 mg per tablet  Commonly known as: NORCO  Take 1 tablet by mouth every 6 (six) hours as needed for Pain.     hyoscyamine 0.125 mg Tab  Commonly known as: ANASPAZ,LEVSIN  Take 1 tablet (125 mcg total) by mouth every 4 (four) hours as needed (pain).     metoclopramide HCl 10 MG tablet  Commonly known as: REGLAN  Take 1 tablet (10 mg total) by mouth every 6 (six) hours as needed (nausea/vomiting).          Time spent on the discharge of patient: 25 minutes    Sharif Rai PA-C  Colorectal Surgery  [facility] ([facility])

## 2023-01-09 NOTE — NURSING
Pt educated on discharge information. Medications delivered from pharmacy. Currently awaiting ride to home. No further questions at this time.

## 2023-01-12 LAB
FINAL PATHOLOGIC DIAGNOSIS: NORMAL
Lab: NORMAL

## 2023-01-17 NOTE — PHYSICIAN QUERY
PT Name: David Sevilla  MR #: [id number]    DOCUMENTATION CLARIFICATION     CDS: Brandy Capley, RN  Email: [email or web address]  This form is a permanent document in the medical record.     Query Date: January 17, 2023    By submitting this query, we are merely seeking further clarification of documentation.  Please utilize your independent clinical judgment when addressing the question(s) below.    The medical record contains the following:  Pathology Findings Location in Medical Record      COLON, SIGMOID, SEGMENTAL RESECTION:   - Diverticulitis with focal rupture, peridiverticular abscess, granulation tissue, and chronic inflammation.   - Surgical margins showing viable benign colon, without diagnostic abnormality.   - Serosal adhesions and mild acute serositis identified.   - Six(6) benign lymph nodes, negative for tumor.   - Negative for malignancy.    Surgical pathology 1/5       Please clarify the pathology findings of rupture and abscess:    [ x ] Pathology findings noted above are ruled in/confirmed as diagnoses   [  ] Pathology findings noted above are not confirmed as diagnoses   [  ] Other diagnosis (please specify): ___________   [  ] Clinically Undetermined     Please document in your progress notes daily for the duration of treatment until resolved and include in your discharge summary.    Form No. [id number]

## 2023-03-06 ENCOUNTER — OFFICE VISIT (OUTPATIENT)
Dept: SURGERY | Facility: CLINIC | Age: 33
End: 2023-03-06
Payer: MEDICAID

## 2023-03-06 VITALS
HEART RATE: 91 BPM | DIASTOLIC BLOOD PRESSURE: 89 MMHG | TEMPERATURE: 97 F | SYSTOLIC BLOOD PRESSURE: 158 MMHG | WEIGHT: 201.5 LBS | BODY MASS INDEX: 25.19 KG/M2

## 2023-03-06 DIAGNOSIS — K57.32 SIGMOID DIVERTICULITIS: Primary | ICD-10-CM

## 2023-03-06 PROCEDURE — 3008F BODY MASS INDEX DOCD: CPT | Mod: CPTII,,, | Performed by: COLON & RECTAL SURGERY

## 2023-03-06 PROCEDURE — 3079F DIAST BP 80-89 MM HG: CPT | Mod: CPTII,,, | Performed by: COLON & RECTAL SURGERY

## 2023-03-06 PROCEDURE — 99999 PR PBB SHADOW E&M-EST. PATIENT-LVL III: CPT | Mod: PBBFAC,,, | Performed by: COLON & RECTAL SURGERY

## 2023-03-06 PROCEDURE — 3079F PR MOST RECENT DIASTOLIC BLOOD PRESSURE 80-89 MM HG: ICD-10-PCS | Mod: CPTII,,, | Performed by: COLON & RECTAL SURGERY

## 2023-03-06 PROCEDURE — 1159F MED LIST DOCD IN RCRD: CPT | Mod: CPTII,,, | Performed by: COLON & RECTAL SURGERY

## 2023-03-06 PROCEDURE — 3077F SYST BP >= 140 MM HG: CPT | Mod: CPTII,,, | Performed by: COLON & RECTAL SURGERY

## 2023-03-06 PROCEDURE — 99999 PR PBB SHADOW E&M-EST. PATIENT-LVL III: ICD-10-PCS | Mod: PBBFAC,,, | Performed by: COLON & RECTAL SURGERY

## 2023-03-06 PROCEDURE — 99024 POSTOP FOLLOW-UP VISIT: CPT | Mod: ,,, | Performed by: COLON & RECTAL SURGERY

## 2023-03-06 PROCEDURE — 99024 PR POST-OP FOLLOW-UP VISIT: ICD-10-PCS | Mod: ,,, | Performed by: COLON & RECTAL SURGERY

## 2023-03-06 PROCEDURE — 99213 OFFICE O/P EST LOW 20 MIN: CPT | Mod: PBBFAC | Performed by: COLON & RECTAL SURGERY

## 2023-03-06 PROCEDURE — 3008F PR BODY MASS INDEX (BMI) DOCUMENTED: ICD-10-PCS | Mod: CPTII,,, | Performed by: COLON & RECTAL SURGERY

## 2023-03-06 PROCEDURE — 1159F PR MEDICATION LIST DOCUMENTED IN MEDICAL RECORD: ICD-10-PCS | Mod: CPTII,,, | Performed by: COLON & RECTAL SURGERY

## 2023-03-06 PROCEDURE — 3077F PR MOST RECENT SYSTOLIC BLOOD PRESSURE >= 140 MM HG: ICD-10-PCS | Mod: CPTII,,, | Performed by: COLON & RECTAL SURGERY

## 2023-03-06 RX ORDER — SODIUM, POTASSIUM,MAG SULFATES 17.5-3.13G
1 SOLUTION, RECONSTITUTED, ORAL ORAL DAILY
Qty: 1 KIT | Refills: 0 | Status: SHIPPED | OUTPATIENT
Start: 2023-03-06 | End: 2023-03-08

## 2023-03-06 NOTE — PROGRESS NOTES
History & Physical    SUBJECTIVE:     History of Present Illness:  Patient is a 33 y.o. male presents with complicated diverticulitis in which he was hospitalized in October 2022. Patient discharged with cipro/flagyl which he completed. States that he started feeling better after taking the antibiotics but then symptoms got better after. A few days after completing his antibiotics, his symptoms got worse again with LLQ abdominal pain, nausea, vomiting, dizziness, malaise, blood in stool. States that he has been eating some but has to go to all liquids to get symptom improvement. States that he has never had a colonoscopy. Was first diagnosed with diverticulitis about 4 years ago, this episode is the worst his symptoms have been. No family history of CRC, polpys, IBD. Denies fever, chills. No previous abdominal surgeries.     01/05/23: laparoscopic sigmoidectomy    Interval history:   Since the last clinic visit, the patient underwent lap sigmoidectomy for his diverticulitis. He is doing very well today with no complaints. He is tolerating a regular diet and having normal bowel movements. He denies fevers, chills, nausea, and vomiting.     Chief Complaint   Patient presents with    Follow-up     Follow up// diverticulitis       Review of patient's allergies indicates:   Allergen Reactions    Morphine Nausea And Vomiting and Other (See Comments)     migraine       Current Outpatient Medications   Medication Sig Dispense Refill    baclofen (LIORESAL) 20 MG tablet Take 20 mg by mouth 3 (three) times daily as needed.      hyoscyamine (ANASPAZ,LEVSIN) 0.125 mg Tab Take 1 tablet (125 mcg total) by mouth every 4 (four) hours as needed (pain). 20 tablet 0    metoclopramide HCl (REGLAN) 10 MG tablet Take 1 tablet (10 mg total) by mouth every 6 (six) hours as needed (nausea/vomiting). 30 tablet 0    ondansetron (ZOFRAN) 4 MG tablet Take 1 tablet (4 mg total) by mouth every 6 (six) hours as needed for Nausea. 30 tablet 0     senna-docusate 8.6-50 mg (SENNA WITH DOCUSATE SODIUM) 8.6-50 mg per tablet Take 1 tablet by mouth 2 (two) times a day. Stop taking if having diarrhea 60 tablet 0    HYDROcodone-acetaminophen (NORCO) 7.5-325 mg per tablet Take 1 tablet by mouth every 6 (six) hours as needed for Pain. 15 tablet 0    oxyCODONE (ROXICODONE) 5 MG immediate release tablet Take 1 tablet (5 mg total) by mouth every 6 (six) hours as needed for Pain. 25 tablet 0    sodium,potassium,mag sulfates (SUPREP BOWEL PREP KIT) 17.5-3.13-1.6 gram SolR Take 177 mLs by mouth once daily. for 2 days 1 kit 0     No current facility-administered medications for this visit.       Past Medical History:   Diagnosis Date    Diverticulitis     Diverticulitis      Past Surgical History:   Procedure Laterality Date    FLEXIBLE SIGMOIDOSCOPY N/A 1/5/2023    Procedure: SIGMOIDOSCOPY, FLEXIBLE;  Surgeon: Prasad Alonso MD;  Location: [facility] OR;  Service: General;  Laterality: N/A;    INJECTION OF ANESTHETIC AGENT INTO TISSUE PLANE DEFINED BY TRANSVERSUS ABDOMINIS MUSCLE N/A 1/5/2023    Procedure: BLOCK, TRANSVERSUS ABDOMINIS PLANE;  Surgeon: Prasad Alonso MD;  Location: [facility] OR;  Service: General;  Laterality: N/A;    LAPAROSCOPIC SIGMOIDECTOMY N/A 1/5/2023    Procedure: COLECTOMY, SIGMOID, LAPAROSCOPIC;  Surgeon: Prasad Alonso MD;  Location: [facility] OR;  Service: General;  Laterality: N/A;    MOBILIZATION OF SPLENIC FLEXURE N/A 1/5/2023    Procedure: MOBILIZATION, SPLENIC FLEXURE;  Surgeon: Prasad Alonso MD;  Location: [facility] OR;  Service: General;  Laterality: N/A;     No family history on file.  Social History     Tobacco Use    Smoking status: Every Day     Types: Vaping with nicotine    Smokeless tobacco: Never   Substance Use Topics    Drug use: Yes     Types: Marijuana        Review of Systems:  Review of Systems   Constitutional:  Negative for activity change, chills, fever and unexpected weight change.   HENT: Negative.  Negative for congestion.     Eyes: Negative.  Negative for visual disturbance.   Respiratory: Negative.  Negative for shortness of breath.    Cardiovascular: Negative.  Negative for chest pain.   Gastrointestinal:  Negative for abdominal distention, abdominal pain, anal bleeding, blood in stool, constipation, diarrhea, nausea, rectal pain and vomiting.   Endocrine: Negative.    Genitourinary:  Negative for dysuria.   Musculoskeletal: Negative.  Negative for arthralgias.   Skin: Negative.  Negative for rash.   Allergic/Immunologic: Negative.    Neurological:  Negative for dizziness.   Hematological:  Negative for adenopathy.   Psychiatric/Behavioral: Negative.     All other systems reviewed and are negative.    OBJECTIVE:     Vital Signs (Most Recent)  Temp: 97.4 °F (36.3 °C) (03/06/23 1049)  Pulse: 91 (03/06/23 1049)  BP: (!) 158/89 (03/06/23 1049)     91.4 kg (201 lb 8 oz)     Physical Exam:  Physical Exam  Vitals and nursing note reviewed.   Constitutional:       General: He is not in acute distress.     Appearance: He is normal weight. He is not ill-appearing.   HENT:      Head: Normocephalic and atraumatic.      Right Ear: External ear normal.      Left Ear: External ear normal.      Nose: Nose normal.   Cardiovascular:      Rate and Rhythm: Normal rate.   Pulmonary:      Effort: Pulmonary effort is normal. No respiratory distress.   Abdominal:      General: Abdomen is flat. There is no distension.      Palpations: Abdomen is soft. There is no mass.      Tenderness: There is no abdominal tenderness.      Hernia: No hernia is present.      Comments: Well healed surgical incisions without signs of infection.   Musculoskeletal:         General: Normal range of motion.      Cervical back: Normal range of motion and neck supple.   Skin:     General: Skin is warm and dry.   Neurological:      Mental Status: He is alert.   Psychiatric:         Mood and Affect: Mood normal.         Behavior: Behavior normal.       Laboratory  CBC: Reviewed  CMP:  Reviewed    Diagnostic Results:  Component 2 mo ago   Final Pathologic Diagnosis RELIAPATH DIAGNOSIS:   A.  COLON, SIGMOID, SEGMENTAL RESECTION:   - Diverticulitis with focal rupture, peridiverticular abscess, granulation   tissue, and chronic inflammation.   - Surgical margins showing viable benign colon, without diagnostic   abnormality.   - Serosal adhesions and mild acute serositis identified.   - Six(6) benign lymph nodes, negative for tumor.   - Negative for malignancy.   B.  ANASTOMOTIC DONUTS:   - Portions of benign colon showing vascular congestion and lamina propria   hemorrhage.   - Negative for malignancy.      CT 01/03/23 Impression:  1. There are multiple diverticula in the descending and sigmoid portions of the colon.  There are moderate inflammatory changes adjacent to the sigmoid colon.  This is characteristic of diverticulitis of the sigmoid colon.  2. There are several noncalcified pulmonary nodules in the visualized portion of the lungs. One of the larger ones measures 5 mm and is located in the posterior aspect of right lower lobe.  On the prior examination it measured 5 mm.  All CT scans at this facility use dose modulation, iterative reconstruction, and/or weight base dosing when appropriate to reduce radiation dose when appropriate to reduce radiation dose to as low as reasonably achievable.    CT 11/28/2022 Impression: no acute or adverse findings. Previously noted sigmoid inflammatory change has essentially resolved.  No fluid collection identified.  No obstructive bowel findings    CT 10/20/2022 Impression:   Extensive descending and sigmoid diverticulosis with thickening inflammatory changes within the proximal sigmoid colon consistent with acute diverticulitis. No drainable fluid collection. Small amount of fluid seen adjacent to the sigmoid colon measuring 2.2 x 1.6 cm. Recommend surgery consult and interval follow-up imaging with oral and IV contrast    ASSESSMENT/PLAN:     33 y.o.  male with hx of complicated diverticulitis now s/p lap sigmoidectomy on 01/05/23    - Pathology reviewed  - Colonoscopy 03/31/23. Suprep to pharmacy   - RTC prn    Prasad Alonso MD  Colon and Rectal Surgery  [facility]

## 2023-03-06 NOTE — H&P (VIEW-ONLY)
History & Physical    SUBJECTIVE:     History of Present Illness:  Patient is a 33 y.o. male presents with complicated diverticulitis in which he was hospitalized in October 2022. Patient discharged with cipro/flagyl which he completed. States that he started feeling better after taking the antibiotics but then symptoms got better after. A few days after completing his antibiotics, his symptoms got worse again with LLQ abdominal pain, nausea, vomiting, dizziness, malaise, blood in stool. States that he has been eating some but has to go to all liquids to get symptom improvement. States that he has never had a colonoscopy. Was first diagnosed with diverticulitis about 4 years ago, this episode is the worst his symptoms have been. No family history of CRC, polpys, IBD. Denies fever, chills. No previous abdominal surgeries.     01/05/23: laparoscopic sigmoidectomy    Interval history:   Since the last clinic visit, the patient underwent lap sigmoidectomy for his diverticulitis. He is doing very well today with no complaints. He is tolerating a regular diet and having normal bowel movements. He denies fevers, chills, nausea, and vomiting.     Chief Complaint   Patient presents with    Follow-up     Follow up// diverticulitis       Review of patient's allergies indicates:   Allergen Reactions    Morphine Nausea And Vomiting and Other (See Comments)     migraine       Current Outpatient Medications   Medication Sig Dispense Refill    baclofen (LIORESAL) 20 MG tablet Take 20 mg by mouth 3 (three) times daily as needed.      hyoscyamine (ANASPAZ,LEVSIN) 0.125 mg Tab Take 1 tablet (125 mcg total) by mouth every 4 (four) hours as needed (pain). 20 tablet 0    metoclopramide HCl (REGLAN) 10 MG tablet Take 1 tablet (10 mg total) by mouth every 6 (six) hours as needed (nausea/vomiting). 30 tablet 0    ondansetron (ZOFRAN) 4 MG tablet Take 1 tablet (4 mg total) by mouth every 6 (six) hours as needed for Nausea. 30 tablet 0     senna-docusate 8.6-50 mg (SENNA WITH DOCUSATE SODIUM) 8.6-50 mg per tablet Take 1 tablet by mouth 2 (two) times a day. Stop taking if having diarrhea 60 tablet 0    HYDROcodone-acetaminophen (NORCO) 7.5-325 mg per tablet Take 1 tablet by mouth every 6 (six) hours as needed for Pain. 15 tablet 0    oxyCODONE (ROXICODONE) 5 MG immediate release tablet Take 1 tablet (5 mg total) by mouth every 6 (six) hours as needed for Pain. 25 tablet 0    sodium,potassium,mag sulfates (SUPREP BOWEL PREP KIT) 17.5-3.13-1.6 gram SolR Take 177 mLs by mouth once daily. for 2 days 1 kit 0     No current facility-administered medications for this visit.       Past Medical History:   Diagnosis Date    Diverticulitis     Diverticulitis      Past Surgical History:   Procedure Laterality Date    FLEXIBLE SIGMOIDOSCOPY N/A 1/5/2023    Procedure: SIGMOIDOSCOPY, FLEXIBLE;  Surgeon: Prasad Alonso MD;  Location: [facility] OR;  Service: General;  Laterality: N/A;    INJECTION OF ANESTHETIC AGENT INTO TISSUE PLANE DEFINED BY TRANSVERSUS ABDOMINIS MUSCLE N/A 1/5/2023    Procedure: BLOCK, TRANSVERSUS ABDOMINIS PLANE;  Surgeon: Prasad Alonso MD;  Location: [facility] OR;  Service: General;  Laterality: N/A;    LAPAROSCOPIC SIGMOIDECTOMY N/A 1/5/2023    Procedure: COLECTOMY, SIGMOID, LAPAROSCOPIC;  Surgeon: Prasad Alonso MD;  Location: [facility] OR;  Service: General;  Laterality: N/A;    MOBILIZATION OF SPLENIC FLEXURE N/A 1/5/2023    Procedure: MOBILIZATION, SPLENIC FLEXURE;  Surgeon: Prasad Alonso MD;  Location: [facility] OR;  Service: General;  Laterality: N/A;     No family history on file.  Social History     Tobacco Use    Smoking status: Every Day     Types: Vaping with nicotine    Smokeless tobacco: Never   Substance Use Topics    Drug use: Yes     Types: Marijuana        Review of Systems:  Review of Systems   Constitutional:  Negative for activity change, chills, fever and unexpected weight change.   HENT: Negative.  Negative for congestion.     Eyes: Negative.  Negative for visual disturbance.   Respiratory: Negative.  Negative for shortness of breath.    Cardiovascular: Negative.  Negative for chest pain.   Gastrointestinal:  Negative for abdominal distention, abdominal pain, anal bleeding, blood in stool, constipation, diarrhea, nausea, rectal pain and vomiting.   Endocrine: Negative.    Genitourinary:  Negative for dysuria.   Musculoskeletal: Negative.  Negative for arthralgias.   Skin: Negative.  Negative for rash.   Allergic/Immunologic: Negative.    Neurological:  Negative for dizziness.   Hematological:  Negative for adenopathy.   Psychiatric/Behavioral: Negative.     All other systems reviewed and are negative.    OBJECTIVE:     Vital Signs (Most Recent)  Temp: 97.4 °F (36.3 °C) (03/06/23 1049)  Pulse: 91 (03/06/23 1049)  BP: (!) 158/89 (03/06/23 1049)     91.4 kg (201 lb 8 oz)     Physical Exam:  Physical Exam  Vitals and nursing note reviewed.   Constitutional:       General: He is not in acute distress.     Appearance: He is normal weight. He is not ill-appearing.   HENT:      Head: Normocephalic and atraumatic.      Right Ear: External ear normal.      Left Ear: External ear normal.      Nose: Nose normal.   Cardiovascular:      Rate and Rhythm: Normal rate.   Pulmonary:      Effort: Pulmonary effort is normal. No respiratory distress.   Abdominal:      General: Abdomen is flat. There is no distension.      Palpations: Abdomen is soft. There is no mass.      Tenderness: There is no abdominal tenderness.      Hernia: No hernia is present.      Comments: Well healed surgical incisions without signs of infection.   Musculoskeletal:         General: Normal range of motion.      Cervical back: Normal range of motion and neck supple.   Skin:     General: Skin is warm and dry.   Neurological:      Mental Status: He is alert.   Psychiatric:         Mood and Affect: Mood normal.         Behavior: Behavior normal.       Laboratory  CBC: Reviewed  CMP:  Reviewed    Diagnostic Results:  Component 2 mo ago   Final Pathologic Diagnosis RELIAPATH DIAGNOSIS:   A.  COLON, SIGMOID, SEGMENTAL RESECTION:   - Diverticulitis with focal rupture, peridiverticular abscess, granulation   tissue, and chronic inflammation.   - Surgical margins showing viable benign colon, without diagnostic   abnormality.   - Serosal adhesions and mild acute serositis identified.   - Six(6) benign lymph nodes, negative for tumor.   - Negative for malignancy.   B.  ANASTOMOTIC DONUTS:   - Portions of benign colon showing vascular congestion and lamina propria   hemorrhage.   - Negative for malignancy.      CT 01/03/23 Impression:  1. There are multiple diverticula in the descending and sigmoid portions of the colon.  There are moderate inflammatory changes adjacent to the sigmoid colon.  This is characteristic of diverticulitis of the sigmoid colon.  2. There are several noncalcified pulmonary nodules in the visualized portion of the lungs. One of the larger ones measures 5 mm and is located in the posterior aspect of right lower lobe.  On the prior examination it measured 5 mm.  All CT scans at this facility use dose modulation, iterative reconstruction, and/or weight base dosing when appropriate to reduce radiation dose when appropriate to reduce radiation dose to as low as reasonably achievable.    CT 11/28/2022 Impression: no acute or adverse findings. Previously noted sigmoid inflammatory change has essentially resolved.  No fluid collection identified.  No obstructive bowel findings    CT 10/20/2022 Impression:   Extensive descending and sigmoid diverticulosis with thickening inflammatory changes within the proximal sigmoid colon consistent with acute diverticulitis. No drainable fluid collection. Small amount of fluid seen adjacent to the sigmoid colon measuring 2.2 x 1.6 cm. Recommend surgery consult and interval follow-up imaging with oral and IV contrast    ASSESSMENT/PLAN:     33 y.o.  male with hx of complicated diverticulitis now s/p lap sigmoidectomy on 01/05/23    - Pathology reviewed  - Colonoscopy 03/31/23. Suprep to pharmacy   - RTC prn    Prasad Alonso MD  Colon and Rectal Surgery  [facility]

## 2023-03-15 ENCOUNTER — PATIENT MESSAGE (OUTPATIENT)
Dept: SURGERY | Facility: CLINIC | Age: 33
End: 2023-03-15
Payer: MEDICAID

## 2023-03-29 ENCOUNTER — ANESTHESIA (OUTPATIENT)
Dept: ENDOSCOPY | Facility: HOSPITAL | Age: 33
End: 2023-03-29
Payer: MEDICAID

## 2023-03-29 ENCOUNTER — HOSPITAL ENCOUNTER (OUTPATIENT)
Facility: HOSPITAL | Age: 33
Discharge: HOME | End: 2023-03-29
Attending: COLON & RECTAL SURGERY | Admitting: COLON & RECTAL SURGERY
Payer: MEDICAID

## 2023-03-29 ENCOUNTER — ANESTHESIA EVENT (OUTPATIENT)
Dept: ENDOSCOPY | Facility: HOSPITAL | Age: 33
End: 2023-03-29
Payer: MEDICAID

## 2023-03-29 VITALS
HEART RATE: 64 BPM | OXYGEN SATURATION: 96 % | RESPIRATION RATE: 18 BRPM | TEMPERATURE: 98 F | SYSTOLIC BLOOD PRESSURE: 128 MMHG | DIASTOLIC BLOOD PRESSURE: 89 MMHG | HEIGHT: 75 IN | BODY MASS INDEX: 25.19 KG/M2

## 2023-03-29 DIAGNOSIS — Z12.11 SCREENING FOR COLON CANCER: ICD-10-CM

## 2023-03-29 PROCEDURE — 63600175 PHARM REV CODE 636 W HCPCS: Performed by: FAMILY MEDICINE

## 2023-03-29 PROCEDURE — 27201089 HC SNARE, DISP (ANY): Performed by: COLON & RECTAL SURGERY

## 2023-03-29 PROCEDURE — 27201012 HC FORCEPS, HOT/COLD, DISP: Performed by: COLON & RECTAL SURGERY

## 2023-03-29 PROCEDURE — 45380 COLONOSCOPY AND BIOPSY: CPT | Mod: 59,,, | Performed by: COLON & RECTAL SURGERY

## 2023-03-29 PROCEDURE — 00811 ANES LWR INTST NDSC NOS: CPT | Performed by: COLON & RECTAL SURGERY

## 2023-03-29 PROCEDURE — 45385 PR COLONOSCOPY,REMV LESN,SNARE: ICD-10-PCS | Mod: ,,, | Performed by: COLON & RECTAL SURGERY

## 2023-03-29 PROCEDURE — 25000003 PHARM REV CODE 250: Performed by: COLON & RECTAL SURGERY

## 2023-03-29 PROCEDURE — 88305 TISSUE EXAM BY PATHOLOGIST: CPT | Mod: 26,,, | Performed by: PATHOLOGY

## 2023-03-29 PROCEDURE — 45385 COLONOSCOPY W/LESION REMOVAL: CPT | Mod: ,,, | Performed by: COLON & RECTAL SURGERY

## 2023-03-29 PROCEDURE — 37000009 HC ANESTHESIA EA ADD 15 MINS: Performed by: COLON & RECTAL SURGERY

## 2023-03-29 PROCEDURE — 37000008 HC ANESTHESIA 1ST 15 MINUTES: Performed by: COLON & RECTAL SURGERY

## 2023-03-29 PROCEDURE — 45385 COLONOSCOPY W/LESION REMOVAL: CPT | Performed by: COLON & RECTAL SURGERY

## 2023-03-29 PROCEDURE — 45380 COLONOSCOPY AND BIOPSY: CPT | Mod: 59 | Performed by: COLON & RECTAL SURGERY

## 2023-03-29 PROCEDURE — 88305 TISSUE EXAM BY PATHOLOGIST: CPT | Performed by: PATHOLOGY

## 2023-03-29 PROCEDURE — 45380 PR COLONOSCOPY,BIOPSY: ICD-10-PCS | Mod: 59,,, | Performed by: COLON & RECTAL SURGERY

## 2023-03-29 PROCEDURE — 88305 TISSUE EXAM BY PATHOLOGIST: ICD-10-PCS | Mod: 26,,, | Performed by: PATHOLOGY

## 2023-03-29 PROCEDURE — 25000003 PHARM REV CODE 250: Performed by: FAMILY MEDICINE

## 2023-03-29 PROCEDURE — 27200997: Performed by: COLON & RECTAL SURGERY

## 2023-03-29 RX ORDER — DEXTROMETHORPHAN/PSEUDOEPHED 2.5-7.5/.8
DROPS ORAL
Status: DISCONTINUED | OUTPATIENT
Start: 2023-03-29 | End: 2023-03-29 | Stop reason: HOSPADM

## 2023-03-29 RX ORDER — PROPOFOL 10 MG/ML
VIAL (ML) INTRAVENOUS
Status: DISCONTINUED | OUTPATIENT
Start: 2023-03-29 | End: 2023-03-29

## 2023-03-29 RX ORDER — LIDOCAINE HYDROCHLORIDE 20 MG/ML
INJECTION, SOLUTION EPIDURAL; INFILTRATION; INTRACAUDAL; PERINEURAL
Status: DISCONTINUED | OUTPATIENT
Start: 2023-03-29 | End: 2023-03-29

## 2023-03-29 RX ADMIN — LIDOCAINE HYDROCHLORIDE 50 MG: 20 INJECTION, SOLUTION EPIDURAL; INFILTRATION; INTRACAUDAL; PERINEURAL at 08:03

## 2023-03-29 RX ADMIN — PROPOFOL 100 MG: 10 INJECTION, EMULSION INTRAVENOUS at 08:03

## 2023-03-29 RX ADMIN — SODIUM CHLORIDE, SODIUM LACTATE, POTASSIUM CHLORIDE, AND CALCIUM CHLORIDE: .6; .31; .03; .02 INJECTION, SOLUTION INTRAVENOUS at 08:03

## 2023-03-29 NOTE — PLAN OF CARE
Dr Alonso came to bedside and discussed findings. NO N/V,  no abdominal pain, no GI bleeding, and vitals stable.  Pt discharged from unit.

## 2023-03-29 NOTE — BRIEF OP NOTE
[facility] (Hospital)  Brief Operative Note     SUMMARY     Surgery Date: 3/29/2023     Surgeon(s) and Role:     * Barbara Alonso MD - Primary    Assisting Surgeon: None    Pre-op Diagnosis:  Sigmoid diverticulitis [K57.32]    Post-op Diagnosis:  Post-Op Diagnosis Codes:     * Sigmoid diverticulitis [K57.32]    Procedure(s) (LRB):  COLONOSCOPY (N/A)    Anesthesia: Choice    Description of the findings of the procedure: two polyps removed    Estimated Blood Loss: * No values recorded between 3/29/2023  8:33 AM and 3/29/2023  8:57 AM *         Specimens:   Specimen (24h ago, onward)       Start     Ordered    03/29/23 0848  Specimen to Pathology, Surgery Gastrointestinal tract  Once        Comments: Pre-op Diagnosis: Sigmoid diverticulitis [K57.32]Procedure(s):COLONOSCOPY Name of specimens: 1. Splenic Flexure polyp2. Rectal polyp3. Anastomatic Bx Abnormal mucosa     References:    Click here for ordering Quick Tip   Question Answer Comment   Procedure Type: Gastrointestinal tract    Which provider would you like to cc? BARBARA ALONSO    Release to patient Immediate        03/29/23 0856                    Discharge Note    SUMMARY     Admit Date: 3/29/2023    Discharge Date and Time: 3/29/2023 8:58 AM    Hospital Course Patient was seen in the preoperative area by both myself and anesthesia. All consents were verified and all questions appropriately answered. All risks, benefits and alternatives explained to patient. Patient proceeded to endoscopy suite for colonoscopy and was discharged home postoperative once cleared by anesthesia.    Final Diagnosis: Post-Op Diagnosis Codes:     * Sigmoid diverticulitis [K57.32]    Disposition: Home or Self Care    Follow Up/Patient Instructions: See Provation report    Medications:  Reconciled Home Medications:      Medication List        CONTINUE taking these medications      baclofen 20 MG tablet  Commonly known as: LIORESAL  Take 20 mg by mouth 3 (three) times  daily as needed.     hyoscyamine 0.125 mg Tab  Commonly known as: ANASPAZ,LEVSIN  Take 1 tablet (125 mcg total) by mouth every 4 (four) hours as needed (pain).     metoclopramide HCl 10 MG tablet  Commonly known as: REGLAN  Take 1 tablet (10 mg total) by mouth every 6 (six) hours as needed (nausea/vomiting).     ondansetron 4 MG tablet  Commonly known as: ZOFRAN  Take 1 tablet (4 mg total) by mouth every 6 (six) hours as needed for Nausea.     STOOL SOFTENER-LAXATIVE 8.6-50 mg per tablet  Generic drug: senna-docusate 8.6-50 mg  Take 1 tablet by mouth 2 (two) times a day. Stop taking if having diarrhea            STOP taking these medications      HYDROcodone-acetaminophen 7.5-325 mg per tablet  Commonly known as: NORCO     oxyCODONE 5 MG immediate release tablet  Commonly known as: ROXICODONE            Discharge Procedure Orders   Diet general     Call MD for:  temperature >100.4     Call MD for:  persistent nausea and vomiting     Call MD for:  severe uncontrolled pain     Call MD for:  difficulty breathing, headache or visual disturbances     Call MD for:  redness, tenderness, or signs of infection (pain, swelling, redness, odor or green/yellow discharge around incision site)     Call MD for:  hives     Call MD for:  persistent dizziness or light-headedness     Call MD for:  extreme fatigue     Activity as tolerated      Follow-up Information       Prasad Alonso MD Follow up.    Specialties: Colon and Rectal Surgery, General Surgery  Why: As needed  Contact information:  [address] [place] [address] DR Evin GOLDMAN [id number] [phone]

## 2023-03-29 NOTE — ANESTHESIA PREPROCEDURE EVALUATION
03/29/2023  David Sevilla is a 33 y.o., male.      Pre-op Assessment    I have reviewed the Patient Summary Reports.     I have reviewed the Nursing Notes. I have reviewed the NPO Status.   I have reviewed the Medications.     Review of Systems  Anesthesia Hx:  No problems with previous Anesthesia  History of prior surgery of interest to airway management or planning: Previous anesthesia: MAC  Denies Personal Hx of Anesthesia complications.   Social:  Smoker    Hematology/Oncology:  Hematology Normal        Cardiovascular:  Cardiovascular Normal  ECG has been reviewed. Normal sinus rhythm   Right ventricular conduction delay   Borderline Abnormal ECG   No previous ECGs available   Confirmed by Juan TOVAR, Hilary' BDarell ([id number]) on 11/30/2022 5:05:06 AM    Pulmonary:  Pulmonary Normal Patient reports that he sleeps elevated because lying flat causes him SOB - no hx of HF   Renal/:  Renal/ Normal     Hepatic/GI:  Hepatic/GI Normal diverticulitis   Neurological:  Neurology Normal    Endocrine:  Endocrine Normal    Psych:   anxiety depression          Physical Exam  General: Well nourished, Cooperative, Alert and Oriented    Airway:  Mallampati: I   Mouth Opening: Normal  TM Distance: Normal  Tongue: Normal  Neck ROM: Normal ROM    Dental:  Intact  Patient denies any currently loose or chipped teeth; Patient denies any removable dental appliances      Anesthesia Plan  Type of Anesthesia, risks & benefits discussed:    Anesthesia Type: MAC  Intra-op Monitoring Plan: Standard ASA Monitors  Induction:  IV  Informed Consent: Informed consent signed with the Patient and all parties understand the risks and agree with anesthesia plan.  All questions answered.   ASA Score: 2  Day of Surgery Review of History & Physical: I have interviewed and examined the patient. I have reviewed the patient's H&P dated:      Ready For Surgery From Anesthesia Perspective.     .

## 2023-03-29 NOTE — TRANSFER OF CARE
"Anesthesia Transfer of Care Note    Patient: David Sevilla    Procedure(s) Performed: Procedure(s) (LRB):  COLONOSCOPY (N/A)    Patient location: GI    Anesthesia Type: MAC    Transport from OR: Transported from OR on room air with adequate spontaneous ventilation    Post pain: adequate analgesia    Post assessment: no apparent anesthetic complications    Post vital signs: stable    Level of consciousness: awake    Nausea/Vomiting: no nausea/vomiting    Complications: none    Transfer of care protocol was followed      Last vitals:   Visit Vitals  /77 (BP Location: Left arm, Patient Position: Lying)   Pulse 81   Temp 36.9 °C (98.4 °F) (Temporal)   Resp 20   Ht 6' 3" (1.905 m)   SpO2 96%   BMI 25.19 kg/m²     "

## 2023-03-29 NOTE — [employer] PATIENT INSTRUCTIONS
Discharge Summary/Instructions after an Endoscopic Procedure  Patient Name: David Sevilla  Patient MRN: [id number]  Patient YOB: 1990  Wednesday, March 29, 2023 Prasad Alonso MD  Dear patient,  As a result of recent federal legislation (The Federal Cures Act), you may   receive lab or pathology results from your procedure in your [facility]   account before your physician is able to contact you. Your physician or   their representative will relay the results to you with their   recommendations at their soonest availability.  Thank you,  RESTRICTIONS:  During your procedure today, you received medications for sedation.  These   medications may affect your judgment, balance and coordination.  Therefore,   for 24 hours, you have the following restrictions:   - DO NOT drive a car, operate machinery, make legal/financial decisions,   sign important papers or drink alcohol.    ACTIVITY:  Today: no heavy lifting, straining or running due to procedural   sedation/anesthesia.  The following day: return to full activity including work.  DIET:  Eat and drink normally unless instructed otherwise.     TREATMENT FOR COMMON SIDE EFFECTS:  - Mild abdominal pain, nausea, belching, bloating or excessive gas:  rest,   eat lightly and use a heating pad.  - Sore Throat: treat with throat lozenges and/or gargle with warm salt   water.  - Because air was used during the procedure, expelling large amounts of air   from your rectum or belching is normal.  - If a bowel prep was taken, you may not have a bowel movement for 1-3 days.    This is normal.  SYMPTOMS TO WATCH FOR AND REPORT TO YOUR PHYSICIAN:  1. Abdominal pain or bloating, other than gas cramps.  2. Chest pain.  3. Back pain.  4. Signs of infection such as: chills or fever occurring within 24 hours   after the procedure.  5. Rectal bleeding, which would show as bright red, maroon, or black stools.   (A tablespoon of blood from the rectum is not serious,  especially if   hemorrhoids are present.)  6. Vomiting.  7. Weakness or dizziness.  GO DIRECTLY TO THE NEAREST EMERGENCY ROOM IF YOU HAVE ANY OF THE FOLLOWING:      Difficulty breathing              Chills and/or fever over 101 F   Persistent vomiting and/or vomiting blood   Severe abdominal pain   Severe chest pain   Black, tarry stools   Bleeding- more than one tablespoon   Any other symptom or condition that you feel may need urgent attention  Your doctor recommends these additional instructions:  If any biopsies were taken, your doctors clinic will contact you in 1 to 2   weeks with any results.  - Discharge patient to home.   - High fiber diet.   - Continue present medications.   - Await pathology results.   - Repeat colonoscopy in 5 years for surveillance.   - Return to primary care physician PRN.  For questions, problems or results please call your physician Prasad Alonso MD at Work:  [phone]  If you have any questions about the above instructions, call the GI   department at [phone] or call the endoscopy unit at [phone]   from 7am until 3 pm.  [facility], EMERGENCY ROOM PHONE NUMBER:   [phone]  IF A COMPLICATION OR EMERGENCY SITUATION ARISES AND YOU ARE UNABLE TO REACH   YOUR PHYSICIAN - GO DIRECTLY TO THE EMERGENCY ROOM.  I have read or have had read to me these discharge instructions for my   procedure and have received a written copy.  I understand these   instructions and will follow-up with my physician if I have any questions.     __________________________________       _____________________________________  Nurse Signature                                          Patient/Designated   Responsible Party Signature  MD Prasad Soler MD  3/29/2023 9:00:51 AM  This report has been verified and signed electronically.  Dear patient,  As a result of recent federal legislation (The Federal Cures Act), you may   receive lab or  pathology results from your procedure in your [employer] [facility]   account before your physician is able to contact you. Your physician or   their representative will relay the results to you with their   recommendations at their soonest availability.  Thank you,  [employer]OVATION

## 2023-03-31 NOTE — ANESTHESIA POSTPROCEDURE EVALUATION
Anesthesia Post Evaluation    Patient: David Sevilla    Procedure(s) Performed: Procedure(s) (LRB):  COLONOSCOPY (N/A)    Final Anesthesia Type: MAC      Patient location during evaluation: PACU  Patient participation: Yes- Able to Participate  Level of consciousness: awake and alert and oriented  Post-procedure vital signs: reviewed and stable  Pain management: adequate  Airway patency: patent  ADA mitigation strategies: Multimodal analgesia  PONV status at discharge: No PONV  Anesthetic complications: no      Cardiovascular status: blood pressure returned to baseline and hemodynamically stable  Respiratory status: unassisted  Hydration status: euvolemic  Follow-up not needed.          Vitals Value Taken Time   /89 03/29/23 0918   Temp 36.6 °C (97.8 °F) 03/29/23 0903   Pulse 64 03/29/23 0918   Resp 18 03/29/23 0918   SpO2 96 % 03/29/23 0918         Event Time   Out of Recovery 09:30:43         Pain/Saleem Score: No data recorded

## 2023-04-03 LAB
FINAL PATHOLOGIC DIAGNOSIS: NORMAL
Lab: NORMAL

## 2024-11-16 ENCOUNTER — HOSPITAL ENCOUNTER (EMERGENCY)
Facility: HOSPITAL | Age: 34
Discharge: HOME | End: 2024-11-16
Attending: EMERGENCY MEDICINE

## 2024-11-16 VITALS
BODY MASS INDEX: 25.38 KG/M2 | HEIGHT: 75 IN | WEIGHT: 204.13 LBS | SYSTOLIC BLOOD PRESSURE: 128 MMHG | DIASTOLIC BLOOD PRESSURE: 76 MMHG | OXYGEN SATURATION: 98 % | HEART RATE: 86 BPM | TEMPERATURE: 99 F | RESPIRATION RATE: 16 BRPM

## 2024-11-16 DIAGNOSIS — J06.9 UPPER RESPIRATORY TRACT INFECTION, UNSPECIFIED TYPE: Primary | ICD-10-CM

## 2024-11-16 DIAGNOSIS — R05.9 COUGH: ICD-10-CM

## 2024-11-16 LAB
GROUP A STREP, MOLECULAR: NEGATIVE
SARS-COV-2 RDRP RESP QL NAA+PROBE: NEGATIVE

## 2024-11-16 PROCEDURE — 99283 EMERGENCY DEPT VISIT LOW MDM: CPT | Mod: 25

## 2024-11-16 PROCEDURE — 25000003 PHARM REV CODE 250

## 2024-11-16 PROCEDURE — 87651 STREP A DNA AMP PROBE: CPT | Performed by: EMERGENCY MEDICINE

## 2024-11-16 PROCEDURE — 87635 SARS-COV-2 COVID-19 AMP PRB: CPT | Performed by: EMERGENCY MEDICINE

## 2024-11-16 RX ORDER — PROMETHAZINE HYDROCHLORIDE AND DEXTROMETHORPHAN HYDROBROMIDE 6.25; 15 MG/5ML; MG/5ML
5 SYRUP ORAL EVERY 6 HOURS PRN
Qty: 118 ML | Refills: 0 | Status: SHIPPED | OUTPATIENT
Start: 2024-11-16

## 2024-11-16 RX ORDER — IBUPROFEN 800 MG/1
800 TABLET ORAL 3 TIMES DAILY PRN
Qty: 15 TABLET | Refills: 0 | Status: SHIPPED | OUTPATIENT
Start: 2024-11-16 | End: 2024-11-21

## 2024-11-16 RX ORDER — IBUPROFEN 800 MG/1
800 TABLET ORAL
Status: COMPLETED | OUTPATIENT
Start: 2024-11-16 | End: 2024-11-16

## 2024-11-16 RX ADMIN — IBUPROFEN 800 MG: 800 TABLET, FILM COATED ORAL at 11:11

## 2024-11-16 NOTE — Clinical Note
"David"Landon Sevilla was seen and treated in our emergency department on 11/16/2024.  He may return to work on 11/19/2024.       If you have any questions or concerns, please don't hesitate to call.      Rafat Riggins PA-C"

## 2024-11-16 NOTE — ED PROVIDER NOTES
Encounter Date: 11/16/2024       History     Chief Complaint   Patient presents with    Sore Throat     Sore throat, cough, fatigue, headache x 2 days,  went to urgent care and tested for flu which was negative. States his girlfriend was dx with strep and placed on antibiotics 2 days ago. + fever of 100 this morning     34 year old male presents with complaints of sore throat, cough, chest congestion, body aches, low grade fever (max of roughly 100), headache that started 2 days ago   He reports visiting the urgent care today and underwent negative flu test   Has history of migraine and this feels similar, left sided retro orbital HA   Reports that his girlfriend tested positive for strep throat recently    Denies vision changes, speech difficulty, confusion, neck pain, cp, sob, ab pain, n/v/d, urinary or bowel changes   He took nyquil last night with some relief. Has not taken any medications today     The history is provided by the patient.     Review of patient's allergies indicates:   Allergen Reactions    Morphine Nausea And Vomiting and Other (See Comments)     migraine     Past Medical History:   Diagnosis Date    Diverticulitis     Diverticulitis      Past Surgical History:   Procedure Laterality Date    COLONOSCOPY N/A 3/29/2023    Procedure: COLONOSCOPY;  Surgeon: Prasad Alonso MD;  Location: [facility];  Service: General;  Laterality: N/A;    FLEXIBLE SIGMOIDOSCOPY N/A 1/5/2023    Procedure: SIGMOIDOSCOPY, FLEXIBLE;  Surgeon: Prasad Alonso MD;  Location: [facility] OR;  Service: General;  Laterality: N/A;    INJECTION OF ANESTHETIC AGENT INTO TISSUE PLANE DEFINED BY TRANSVERSUS ABDOMINIS MUSCLE N/A 1/5/2023    Procedure: BLOCK, TRANSVERSUS ABDOMINIS PLANE;  Surgeon: Prasad Alonso MD;  Location: [facility] OR;  Service: General;  Laterality: N/A;    LAPAROSCOPIC SIGMOIDECTOMY N/A 1/5/2023    Procedure: COLECTOMY, SIGMOID, LAPAROSCOPIC;  Surgeon: Prasad Alonso MD;  Location: [facility] OR;  Service:  General;  Laterality: N/A;    MOBILIZATION OF SPLENIC FLEXURE N/A 1/5/2023    Procedure: MOBILIZATION, SPLENIC FLEXURE;  Surgeon: Prasad Alonso MD;  Location: [facility] OR;  Service: General;  Laterality: N/A;     No family history on file.  Social History     Tobacco Use    Smoking status: Former     Types: Vaping with nicotine    Smokeless tobacco: Never   Substance Use Topics    Alcohol use: Yes     Comment: rare    Drug use: Not Currently     Types: Marijuana     Review of Systems   Constitutional:  Positive for chills and fever.   HENT:  Positive for congestion and sore throat.    Respiratory:  Positive for cough. Negative for shortness of breath.    Cardiovascular:  Negative for chest pain.   Gastrointestinal:  Negative for abdominal pain, nausea and vomiting.   Genitourinary:  Negative for dysuria.   Musculoskeletal:  Positive for myalgias. Negative for back pain.   Skin:  Negative for rash.   Neurological:  Positive for headaches. Negative for dizziness, syncope, facial asymmetry, speech difficulty, weakness and numbness.   Hematological:  Does not bruise/bleed easily.       Physical Exam     Initial Vitals [11/16/24 1107]   BP Pulse Resp Temp SpO2   128/76 86 16 99.1 °F (37.3 °C) 98 %      MAP       --         Physical Exam    Vitals reviewed.  Constitutional: He appears well-developed and well-nourished. He is not diaphoretic. No distress.   HENT:   Head: Normocephalic and atraumatic.   Right Ear: External ear normal.   Left Ear: External ear normal. Mouth/Throat: Uvula is midline and mucous membranes are normal. He does not have dentures. Normal dentition. No dental abscesses or uvula swelling. Posterior oropharyngeal erythema present. No oropharyngeal exudate, posterior oropharyngeal edema or tonsillar abscesses.   Eyes: Conjunctivae and EOM are normal. Pupils are equal, round, and reactive to light.   Neck: Neck supple. No thyroid mass and no thyromegaly present. No Brudzinski's sign and no Kernig's  sign noted.   Able to actively flex neck touching chin to chest without issue    Normal range of motion.   Full passive range of motion without pain.     Cardiovascular:  Normal rate, regular rhythm and normal heart sounds.           Pulmonary/Chest: Breath sounds normal. No respiratory distress. He has no wheezes.   Abdominal: Abdomen is soft. Bowel sounds are normal. He exhibits no distension. There is no abdominal tenderness. There is no rebound.   Musculoskeletal:         General: No tenderness. Normal range of motion.      Cervical back: Full passive range of motion without pain, normal range of motion and neck supple. No rigidity.     Neurological: He is alert and oriented to person, place, and time. He has normal strength. No cranial nerve deficit or sensory deficit. GCS score is 15. GCS eye subscore is 4. GCS verbal subscore is 5. GCS motor subscore is 6.   Skin: Skin is warm. Capillary refill takes less than 2 seconds.   Psychiatric: He has a normal mood and affect. His behavior is normal. Thought content normal.         ED Course   Procedures  Labs Reviewed   GROUP A STREP, MOLECULAR       Result Value    Group A Strep, Molecular Negative     SARS-COV-2 RNA AMPLIFICATION, QUAL    SARS-CoV-2 RNA, Amplification, Qual Negative            Imaging Results              X-Ray Chest PA And Lateral (Final result)  Result time 11/16/24 11:43:40      Final result by Jose CooperDocMD nitish (11/16/24 11:43:40)                   Impression:      No acute findings.      Electronically signed by: Jose Cooper MD  Date:    11/16/2024  Time:    11:43               Narrative:    EXAMINATION:  XR CHEST PA AND LATERAL    CLINICAL HISTORY  Cough,    COMPARISON:  None    FINDINGS:  Two views.  The heart size is normal.  The lung fields are clear.  No acute cardiopulmonary infiltrative.                                       Medications   ibuprofen tablet 800 mg (800 mg Oral Given 11/16/24 1149)     Medical Decision  Making  Discussed negative swabs and chest xray neg for pneumonia or any acute process   Hx of migraines that feel similar to today   Benign neurologic exam   Symptoms improved in ED with ibuprofen   ER precautions discussed in detail     Amount and/or Complexity of Data Reviewed  Radiology: ordered.    Risk  Prescription drug management.                                      Clinical Impression:  Final diagnoses:  [R05.9] Cough  [J06.9] Upper respiratory tract infection, unspecified type (Primary)          ED Disposition Condition    Discharge Stable          ED Prescriptions       Medication Sig Dispense Start Date End Date Auth. Provider    ibuprofen (ADVIL,MOTRIN) 800 MG tablet Take 1 tablet (800 mg total) by mouth 3 (three) times daily as needed for Pain. 15 tablet 11/16/2024 11/21/2024 Rafat Riggins PA-C    promethazine-dextromethorphan (PROMETHAZINE-DM) 6.25-15 mg/5 mL Syrp Take 5 mLs by mouth every 6 (six) hours as needed (for cough). 118 mL 11/16/2024 -- Rafat Riggins PA-C          Follow-up Information       Follow up With Specialties Details Why Contact Info    Prasad Alonso MD Colon and Rectal Surgery Schedule an appointment as soon as possible for a visit   [id number] [place] [address] [address] [place] [address] [phone]      [facility] Dept. Emergency Medicine  If symptoms worsen [address] [facility] [address] [phone]             Rafat Riggins PA-C  11/16/24 9293

## 2024-12-16 ENCOUNTER — TELEPHONE (OUTPATIENT)
Dept: SURGERY | Facility: CLINIC | Age: 34
End: 2024-12-16
Payer: COMMERCIAL

## 2024-12-16 ENCOUNTER — HOSPITAL ENCOUNTER (OUTPATIENT)
Dept: RADIOLOGY | Facility: HOSPITAL | Age: 34
Discharge: HOME | End: 2024-12-16
Payer: COMMERCIAL

## 2024-12-16 DIAGNOSIS — R10.32 LEFT LOWER QUADRANT PAIN: ICD-10-CM

## 2024-12-16 DIAGNOSIS — R10.32 LEFT LOWER QUADRANT PAIN: Primary | ICD-10-CM

## 2024-12-16 PROCEDURE — 74177 CT ABD & PELVIS W/CONTRAST: CPT | Mod: 26,,, | Performed by: RADIOLOGY

## 2024-12-16 PROCEDURE — A9698 NON-RAD CONTRAST MATERIALNOC: HCPCS

## 2024-12-16 PROCEDURE — 74177 CT ABD & PELVIS W/CONTRAST: CPT | Mod: TC

## 2024-12-16 PROCEDURE — 25500020 PHARM REV CODE 255

## 2024-12-16 RX ADMIN — IOHEXOL 1000 ML: 9 SOLUTION ORAL at 05:12

## 2024-12-16 RX ADMIN — IOHEXOL 100 ML: 350 INJECTION, SOLUTION INTRAVENOUS at 05:12

## 2024-12-16 NOTE — TELEPHONE ENCOUNTER
Called patient - states that he developed LLQ abdominal pain Friday with associated n/v. No fevers. Hx of sigmoidectomy for complicated diverticulitis 2023. F/u colonoscopy with descending colon diverticulosis. Patient states that symptoms similar to when he had diverticulitis. Will order stat CT scan to eval for recurrent diverticulitis. Discussed that if symptoms worsen, should report to ED for further eval. Patient verbalized understanding     ----- Message from Landen Brewer sent at 12/16/2024  9:30 AM CST -----  Contact: Pt [phone]    ----- Message -----  From: Alberta Ray  Sent: 12/16/2024   9:22 AM CST  To: C[facility] Staff    Would like to receive medical advice.    Symptoms (please be specific):  diverticulitis flare up    How long has the patient had these symptoms:  Friday    Pharmacy name/number (copy/paste from chart):      [id number] - [place] - [address] [facility]  [address] [place] [address]  Phone: [phone] Fax: [phone]      Would they like a call back or a response via [facility]:  Call back     Additional information:  He said he has been fine since surgery up until now.  He is asking for advise on what he should do.  He said sometimes the hyoscyamine (ANASPAZ,LEVSIN) 0.125 mg Tab helps so he wasn't sure if that could be called in.

## 2024-12-16 NOTE — TELEPHONE ENCOUNTER
Called and spoke with patient regarding stat CT. Informed patient Sharif placed a stat CT scan and it is scheduled today at 5:30 at the [facility]. Informed patient he must arrive at 4:30 pm and nothing to eat or drink after 1:30. Patient verbalized understanding.    ----- Message from Sharif Grimes PA-C sent at 12/16/2024 11:35 AM CST -----  Contact: Pt [phone]  I ordered a stat CT if you can get it scheduled hopefully this week  ----- Message -----  From: Daniella Patel MA  Sent: 12/16/2024   9:30 AM CST  To: Sharif Grimes PA-C      ----- Message -----  From: Alberta Ray  Sent: 12/16/2024   9:22 AM CST  To: Celeste [facility]ham Staff    Would like to receive medical advice.    Symptoms (please be specific):  diverticulitis flare up    How long has the patient had these symptoms:  Friday    Pharmacy name/number (copy/paste from chart):      [id number] - [place] - [address] AT [facility]  [address] [place] [address]  Phone: [phone] Fax: [phone]      Would they like a call back or a response via [facility]:  Call back     Additional information:  He said he has been fine since surgery up until now.  He is asking for advise on what he should do.  He said sometimes the hyoscyamine (ANASPAZ,LEVSIN) 0.125 mg Tab helps so he wasn't sure if that could be called in.

## 2024-12-17 ENCOUNTER — TELEPHONE (OUTPATIENT)
Dept: SURGERY | Facility: HOSPITAL | Age: 34
End: 2024-12-17
Payer: COMMERCIAL

## 2024-12-17 NOTE — TELEPHONE ENCOUNTER
Called patient regarding CT scan - no evidence of acute diverticulitis, mild mucosal colonic thickening. Patient had colonoscopy 3/2023 which was normal. He is feeling better with bowel rest, CLD. No fevers, n/v. Recommend continued bowel rest and advance diet as tolerates. Can let us know if symptoms fail to improve or worsen. No need for abx at this time. Patient verbalized understanding. Work excuse written for patient        ----- Message from SEBASTIAN Banda sent at 12/17/2024  1:08 PM CST -----  Please advise. Also, are we able to give a work excuse? I see you only did a telephone call  ----- Message -----  From: Mercy Salguero  Sent: 12/17/2024  12:55 PM CST  To: Cel[facility] Staff    Type:  Test Results    Who Called: PT    Name of Test (Lab/Mammo/Etc): CT SCAN     Date of Test: 12/16    Ordering Provider: THELMA YEPEZ    Where the test was performed: [facility]    Would the patient rather a call back or a response via [facility]? CALL    Best Call Back Number: Telephone Information:[phone]          Additional Information: Pt would like callback to discuss.Pt is also asking for work excuse from yesterday visit.Please advise thank you

## 2024-12-19 ENCOUNTER — TELEPHONE (OUTPATIENT)
Dept: SURGERY | Facility: CLINIC | Age: 34
End: 2024-12-19
Payer: COMMERCIAL

## 2024-12-19 DIAGNOSIS — Z75.8 DOES NOT HAVE PRIMARY CARE PROVIDER: Primary | ICD-10-CM

## 2024-12-19 NOTE — TELEPHONE ENCOUNTER
"Called pt back per message. Pt reports being "weak from diverticulitis" and wants work excuse to be extended until Monday 12/23. Per recent CT scan and provider, pt does not have diverticulitis. Offered appt with provider, pt refused as he states he is feeling better. Advised pt to go to urgent care or ER if symptoms don't improve. Referral in chart for initial PCP appt. Verbalized understanding, no further questions at this time.     ----- Message from Cristina sent at 12/19/2024 10:50 AM CST -----  Contact: David  Patient is calling to speak with someone regarding excuse. Patient reports receiving a work excuse and request to confirm if work excuse can be extended until Monday. Please give patient a call back at [phone] to assist.  Thank you,  GH  "

## 2025-01-30 ENCOUNTER — HOSPITAL ENCOUNTER (EMERGENCY)
Facility: HOSPITAL | Age: 35
Discharge: HOME | End: 2025-01-30
Attending: EMERGENCY MEDICINE
Payer: COMMERCIAL

## 2025-01-30 VITALS
DIASTOLIC BLOOD PRESSURE: 74 MMHG | RESPIRATION RATE: 17 BRPM | OXYGEN SATURATION: 96 % | WEIGHT: 194 LBS | HEART RATE: 67 BPM | TEMPERATURE: 98 F | BODY MASS INDEX: 24.25 KG/M2 | SYSTOLIC BLOOD PRESSURE: 121 MMHG

## 2025-01-30 DIAGNOSIS — M43.6 TORTICOLLIS: Primary | ICD-10-CM

## 2025-01-30 PROCEDURE — 99284 EMERGENCY DEPT VISIT MOD MDM: CPT | Mod: 25

## 2025-01-30 PROCEDURE — 96372 THER/PROPH/DIAG INJ SC/IM: CPT | Mod: JZ | Performed by: EMERGENCY MEDICINE

## 2025-01-30 PROCEDURE — 63600175 PHARM REV CODE 636 W HCPCS: Mod: TB,JZ | Performed by: EMERGENCY MEDICINE

## 2025-01-30 RX ORDER — CYCLOBENZAPRINE HCL 10 MG
5 TABLET ORAL 3 TIMES DAILY PRN
Qty: 15 TABLET | Refills: 0 | Status: SHIPPED | OUTPATIENT
Start: 2025-01-30 | End: 2025-02-09

## 2025-01-30 RX ORDER — METOCLOPRAMIDE HYDROCHLORIDE 5 MG/ML
10 INJECTION INTRAMUSCULAR; INTRAVENOUS
Status: COMPLETED | OUTPATIENT
Start: 2025-01-30 | End: 2025-01-30

## 2025-01-30 RX ORDER — HYDROMORPHONE HYDROCHLORIDE 1 MG/ML
1 INJECTION, SOLUTION INTRAMUSCULAR; INTRAVENOUS; SUBCUTANEOUS
Status: COMPLETED | OUTPATIENT
Start: 2025-01-30 | End: 2025-01-30

## 2025-01-30 RX ORDER — HYDROCODONE BITARTRATE AND ACETAMINOPHEN 10; 325 MG/1; MG/1
1 TABLET ORAL EVERY 6 HOURS PRN
Qty: 12 TABLET | Refills: 0 | Status: SHIPPED | OUTPATIENT
Start: 2025-01-30

## 2025-01-30 RX ADMIN — HYDROMORPHONE HYDROCHLORIDE 1 MG: 1 INJECTION, SOLUTION INTRAMUSCULAR; INTRAVENOUS; SUBCUTANEOUS at 06:01

## 2025-01-30 RX ADMIN — METOCLOPRAMIDE 10 MG: 5 INJECTION, SOLUTION INTRAMUSCULAR; INTRAVENOUS at 06:01

## 2025-01-30 NOTE — DISCHARGE INSTRUCTIONS
Discontinue Toradol.  Use ibuprofen for pain, Flexeril as a muscle relaxer Norco for breakthrough pain.  Consider a new pillow.  Follow up with her doctor in 1-2 days for re-evaluation return as needed for any worsening symptoms, problems, questions or concerns

## 2025-01-30 NOTE — Clinical Note
"David"Landon Sevilla was seen and treated in our emergency department on 1/30/2025.  He may return to work on 01/31/2025.       If you have any questions or concerns, please don't hesitate to call.      Hung Conroy Jr., MD"

## 2025-01-30 NOTE — ED PROVIDER NOTES
SCRIBE #1 NOTE: I, Braeden Henrandez, am scribing for, and in the presence of, Hung Conroy Jr., MD. I have scribed the entire note.       History     Chief Complaint   Patient presents with    Torticollis     Report neck stiffness x1 wk radiating to his head causing headache, blurred vision, nausea; was seen at  and given rx toradol but it caused abdominal pain     Review of patient's allergies indicates:   Allergen Reactions    Morphine Nausea And Vomiting and Other (See Comments)     migraine         History of Present Illness     HPI    1/30/2025, 6:49 AM  History obtained from the patient      History of Present Illness: David Sevilla is a 34 y.o. male patient with a PMHx of diverticulosis who presents to the Emergency Department for evaluation of neck pain which onset gradually within the past week. Pain radiates into upper back. There is some neck stiffness. Pain worsens with movement and speaking, which is causing the pt to speak more slowly; pain is located in the right neck extending from behind the ear down into the shoulder.  He notes it radiates upper to his head causing the headache.  Denies any head trauma.  There was no weakness noted.  Denies any numbness or tingling.  He does have a remote history of migraines. Pt initially thought he had slept wrong on the neck. Pt reports receiving Toradol rx from  but it caused abd pain. Symptoms are constant and moderate in severity. Associated sxs include HA, dizziness, N/V, chills, diaphoresis, cough, and neck stiffness. Patient denies any CP, SOB, dysuria, fever, and all other sxs at this time.  No further complaints or concerns at this time.       Arrival mode: Personal vehicle     PCP: No primary care provider on file.        Past Medical History:  Past Medical History:   Diagnosis Date    Diverticulitis     Diverticulitis        Past Surgical History:  Past Surgical History:   Procedure Laterality Date    COLONOSCOPY N/A 3/29/2023     Procedure: COLONOSCOPY;  Surgeon: Prasad Alonso MD;  Location: [facility] ENDO;  Service: General;  Laterality: N/A;    FLEXIBLE SIGMOIDOSCOPY N/A 1/5/2023    Procedure: SIGMOIDOSCOPY, FLEXIBLE;  Surgeon: Prasad Alonso MD;  Location: [facility] OR;  Service: General;  Laterality: N/A;    INJECTION OF ANESTHETIC AGENT INTO TISSUE PLANE DEFINED BY TRANSVERSUS ABDOMINIS MUSCLE N/A 1/5/2023    Procedure: BLOCK, TRANSVERSUS ABDOMINIS PLANE;  Surgeon: Prasad Alonso MD;  Location: [facility] OR;  Service: General;  Laterality: N/A;    LAPAROSCOPIC SIGMOIDECTOMY N/A 1/5/2023    Procedure: COLECTOMY, SIGMOID, LAPAROSCOPIC;  Surgeon: Prasad Alonso MD;  Location: [facility] OR;  Service: General;  Laterality: N/A;    MOBILIZATION OF SPLENIC FLEXURE N/A 1/5/2023    Procedure: MOBILIZATION, SPLENIC FLEXURE;  Surgeon: Prasad Alonso MD;  Location: [facility] OR;  Service: General;  Laterality: N/A;         Family History:  No family history on file.    Social History:  Social History     Tobacco Use    Smoking status: Former     Types: Vaping with nicotine    Smokeless tobacco: Never   Substance and Sexual Activity    Alcohol use: Yes     Comment: rare    Drug use: Not Currently     Types: Marijuana    Sexual activity: Yes     Partners: Female        Review of Systems     Review of Systems   Constitutional:  Positive for chills and diaphoresis. Negative for fever.   HENT:  Negative for congestion and sore throat.    Respiratory:  Positive for cough. Negative for shortness of breath.    Cardiovascular:  Negative for chest pain.   Gastrointestinal:  Positive for nausea and vomiting. Negative for abdominal pain.   Genitourinary:  Negative for dysuria.   Musculoskeletal:  Positive for back pain (upper), neck pain and neck stiffness.   Skin:  Negative for rash.   Neurological:  Positive for dizziness and headaches. Negative for weakness.   Hematological:  Does not bruise/bleed easily.   Psychiatric/Behavioral:  Positive for confusion.     All other systems reviewed and are negative.       Physical Exam     Initial Vitals [01/30/25 0641]   BP Pulse Resp Temp SpO2   (!) 149/81 82 16 98.3 °F (36.8 °C) 98 %      MAP       --          Physical Exam  Nursing Notes and Vital Signs Reviewed.  Constitutional: Patient is in no acute distress. Well-developed and well-nourished.  Head: Atraumatic. Normocephalic.  Eyes:  EOM intact.  No scleral icterus.  ENT: Mucous membranes are moist.  Nares clear .  0 P clear.  TMs normal.  No mastoid tenderness or erythema.  0 P is clear  Neck:  Full ROM. No JVD.  Trachea midline.  No lymphadenopathy  Cardiovascular: Regular rate. Regular rhythm No murmurs, rubs, or gallops. Distal pulses are 2+ and symmetric  Pulmonary/Chest: No respiratory distress. Clear to auscultation bilaterally. No wheezing or rales.  Equal chest wall rise bilaterally  Abdominal: Soft and non-distended.  There is no tenderness.  No rebound, guarding, or rigidity. Good bowel sounds.  Musculoskeletal: Moves all extremities. No obvious deformities.  5 x 5 strength in all extremities.  There is no point C/T/L/S tenderness.  Normal spinal curvature.  It is diffuse tenderness along the spasms right trapezius muscle.  Pain is worse with movement of the head and axial rotation as well as movement of the shoulder.  There is no weakness noted.    Skin: Warm and dry.  Neurological:  Alert, awake, and appropriate.  Normal speech.  No acute focal neurological deficits are appreciated.  Two through 12 intact bilaterally.  Psychiatric: Normal affect. Good eye contact. Appropriate in content.       ED Course   Procedures  ED Vital Signs:  Vitals:    01/30/25 0641 01/30/25 0657 01/30/25 0700 01/30/25 0715   BP: (!) 149/81  (!) 151/87 133/70   Pulse: 82  79 78   Resp: 16 20  17   Temp: 98.3 °F (36.8 °C)      TempSrc: Oral      SpO2: 98%  99% 98%   Weight: 88 kg (194 lb)       01/30/25 0800   BP: 121/74   Pulse: 67   Resp: 17   Temp:    TempSrc:    SpO2: 96%   Weight:         Abnormal Lab Results:  Labs Reviewed - No data to display     Imaging Results:  Imaging Results              CT Head Without Contrast (Final result)  Result time 01/30/25 08:00:33      Final result by Jose Cooper MD (Timothy) (01/30/25 08:00:33)                   Impression:      No acute intracranial abnormality.    All CT scans at this facility use dose modulation, iterative reconstructions, and/or weight base dosing when appropriate to reduce radiation dose to as low as reasonably achievable.      Electronically signed by: Jose Cooper MD  Date:    01/30/2025  Time:    08:00               Narrative:    EXAMINATION:  CT HEAD WITHOUT CONTRAST    CLINICAL HISTORY:  Headache, sudden, severe;    TECHNIQUE:  Noncontrast images were obtained.    COMPARISON:  None    FINDINGS:  No intracranial acute hemorrhage or acute focal brain parenchymal abnormality is identified.  Calvarium is intact.  3.2 cm mucous retention cyst left maxillary sinus.                                       CT Cervical Spine Without Contrast (Final result)  Result time 01/30/25 08:02:19      Final result by Jose Cooper MD (Timothy) (01/30/25 08:02:19)                   Impression:      Unremarkable exam.    All CT scans at this facility use dose modulation, iterative reconstructions, and/or weight base dosing when appropriate to reduce radiation dose to as low as reasonably achievable.      Electronically signed by: Jose Cooper MD  Date:    01/30/2025  Time:    08:02               Narrative:    EXAMINATION:  CT CERVICAL SPINE WITHOUT CONTRAST    CLINICAL HISTORY:  Cervical radiculopathy, no red flags;    TECHNIQUE:  Thin section axial images were acquired. Reformatted images were generated in the sagittal and coronal planes.    COMPARISON:  None    FINDINGS:  No fracture, precervical soft tissue swelling, or subluxation identified. No CT evidence of central canal stenosis or traumatic disc herniation.  No evidence of foraminal  stenosis.                                              The Emergency Provider reviewed the vital signs and test results, which are outlined above.     ED Discussion       8:14 AM: Reassessed pt at this time.  Discussed with pt all pertinent ED information and results. Discussed pt dx and plan of tx. Gave pt all f/u and return to the ED instructions. All questions and concerns were addressed at this time. Pt expresses understanding of information and instructions, and is comfortable with plan to discharge. Pt is stable for discharge.    I discussed with patient and/or family/caretaker that evaluation in the ED does not suggest any emergent or life threatening medical conditions requiring immediate intervention beyond what was provided in the ED, and I believe patient is safe for discharge.  Regardless, an unremarkable evaluation in the ED does not preclude the development or presence of a serious of life threatening condition. As such, patient was instructed to return immediately for any worsening or change in current symptoms.         Medical Decision Making  Differential diagnosis:  Torticollis, neck pain, mastoiditis, by grade, intracranial hemorrhage    The patient is evaluated with the history and physical examination along with the  review.  Patient with right-sided neck pain worse with movement for several days now radiating to head with headache.  CT imaging of the head and neck are negative for acute findings.  Clinically the patient has torticollis and I will treat with anti-inflammatories muscle relaxers and pain control.  Discussed all findings with the patient's well as plan of care.  I have advised him to discontinue Toradol and use ibuprofen in lieu of this is side effects of Toradol are uncomfortable for him.  I have advised him not to drive or operate machinery or equipment while taking Norco due the risks.  Patient verbalized understanding agreement with the plan of care seems reliable.  Stable  safe for discharge in my opinion    Amount and/or Complexity of Data Reviewed  External Data Reviewed: notes.     Details:  reviewed  Radiology: ordered. Decision-making details documented in ED Course.     Details: Negative head and neck CTs    Risk  OTC drugs.  Prescription drug management.  Parenteral controlled substances.                ED Medication(s):  Medications   metoclopramide injection 10 mg (10 mg Intramuscular Given 1/30/25 0658)   HYDROmorphone injection 1 mg (1 mg Intramuscular Given 1/30/25 0657)       Discharge Medication List as of 1/30/2025  8:12 AM        START taking these medications    Details   cyclobenzaprine (FLEXERIL) 10 MG tablet Take 0.5 tablets (5 mg total) by mouth 3 (three) times daily as needed., Starting Thu 1/30/2025, Until Sun 2/9/2025 at 2359, Normal      HYDROcodone-acetaminophen (NORCO)  mg per tablet Take 1 tablet by mouth every 6 (six) hours as needed., Starting Thu 1/30/2025, Normal              Follow-up Information       PCP In 2 days.                                 Scribe Attestation:   Scribe #1: I performed the above scribed service and the documentation accurately describes the services I performed. I attest to the accuracy of the note.     Attending:   Physician Attestation Statement for Scribe #1: I, Hung Conroy Jr., MD, personally performed the services described in this documentation, as scribed by Braeden Hernandez, in my presence, and it is both accurate and complete.           Clinical Impression       ICD-10-CM ICD-9-CM   1. Torticollis  M43.6 723.5       Disposition:   Disposition: Discharged  Condition: Stable        Hung Conroy Jr., MD  01/30/25 0856

## 2025-08-05 ENCOUNTER — HOSPITAL ENCOUNTER (EMERGENCY)
Facility: HOSPITAL | Age: 35
Discharge: HOME | End: 2025-08-05
Attending: EMERGENCY MEDICINE
Payer: COMMERCIAL

## 2025-08-05 VITALS
HEIGHT: 75 IN | RESPIRATION RATE: 15 BRPM | BODY MASS INDEX: 26.69 KG/M2 | OXYGEN SATURATION: 97 % | DIASTOLIC BLOOD PRESSURE: 89 MMHG | WEIGHT: 214.63 LBS | SYSTOLIC BLOOD PRESSURE: 158 MMHG | HEART RATE: 84 BPM | TEMPERATURE: 98 F

## 2025-08-05 DIAGNOSIS — S99.922A INJURY OF LEFT TOE: ICD-10-CM

## 2025-08-05 PROCEDURE — 99284 EMERGENCY DEPT VISIT MOD MDM: CPT | Mod: 25

## 2025-08-05 PROCEDURE — 63600175 PHARM REV CODE 636 W HCPCS: Mod: JZ,TB | Performed by: EMERGENCY MEDICINE

## 2025-08-05 PROCEDURE — 96372 THER/PROPH/DIAG INJ SC/IM: CPT | Performed by: EMERGENCY MEDICINE

## 2025-08-05 RX ORDER — OXYCODONE AND ACETAMINOPHEN 5; 325 MG/1; MG/1
1 TABLET ORAL EVERY 6 HOURS PRN
Qty: 12 TABLET | Refills: 0 | Status: SHIPPED | OUTPATIENT
Start: 2025-08-05

## 2025-08-05 RX ORDER — KETOROLAC TROMETHAMINE 30 MG/ML
30 INJECTION, SOLUTION INTRAMUSCULAR; INTRAVENOUS
Status: COMPLETED | OUTPATIENT
Start: 2025-08-05 | End: 2025-08-05

## 2025-08-05 RX ADMIN — KETOROLAC TROMETHAMINE 30 MG: 30 INJECTION, SOLUTION INTRAMUSCULAR at 04:08

## 2025-08-11 ENCOUNTER — OFFICE VISIT (OUTPATIENT)
Dept: PODIATRY | Facility: CLINIC | Age: 35
End: 2025-08-11
Payer: COMMERCIAL

## 2025-08-11 DIAGNOSIS — S99.922A TOE INJURY, LEFT, INITIAL ENCOUNTER: Primary | ICD-10-CM

## 2025-08-11 PROCEDURE — 99203 OFFICE O/P NEW LOW 30 MIN: CPT | Mod: S$GLB,,, | Performed by: PODIATRIST

## 2025-08-11 PROCEDURE — 99999 PR PBB SHADOW E&M-EST. PATIENT-LVL III: CPT | Mod: PBBFAC,,, | Performed by: PODIATRIST

## 2025-08-12 ENCOUNTER — HOSPITAL ENCOUNTER (EMERGENCY)
Facility: HOSPITAL | Age: 35
Discharge: HOME | End: 2025-08-13
Attending: EMERGENCY MEDICINE
Payer: COMMERCIAL

## 2025-08-12 DIAGNOSIS — R10.32 LEFT LOWER QUADRANT ABDOMINAL PAIN: Primary | ICD-10-CM

## 2025-08-12 LAB
ABSOLUTE EOSINOPHIL (OHS): 0.24 K/UL
ABSOLUTE MONOCYTE (OHS): 0.51 K/UL (ref 0.3–1)
ABSOLUTE NEUTROPHIL COUNT (OHS): 4.46 K/UL (ref 1.8–7.7)
ALBUMIN SERPL BCP-MCNC: 4.7 G/DL (ref 3.5–5.2)
ALP SERPL-CCNC: 68 UNIT/L (ref 40–150)
ALT SERPL W/O P-5'-P-CCNC: 46 UNIT/L (ref 10–44)
ANION GAP (OHS): 14 MMOL/L (ref 8–16)
AST SERPL-CCNC: 34 UNIT/L (ref 11–45)
BASOPHILS # BLD AUTO: 0.09 K/UL
BASOPHILS NFR BLD AUTO: 1.1 %
BILIRUB SERPL-MCNC: 0.4 MG/DL (ref 0.1–1)
BILIRUB UR QL STRIP.AUTO: NEGATIVE
BUN SERPL-MCNC: 9 MG/DL (ref 6–20)
CALCIUM SERPL-MCNC: 9.7 MG/DL (ref 8.7–10.5)
CHLORIDE SERPL-SCNC: 103 MMOL/L (ref 95–110)
CLARITY UR: CLEAR
CO2 SERPL-SCNC: 23 MMOL/L (ref 23–29)
COLOR UR AUTO: COLORLESS
CREAT SERPL-MCNC: 1.1 MG/DL (ref 0.5–1.4)
ERYTHROCYTE [DISTWIDTH] IN BLOOD BY AUTOMATED COUNT: 11.5 % (ref 11.5–14.5)
GFR SERPLBLD CREATININE-BSD FMLA CKD-EPI: >60 ML/MIN/1.73/M2
GLUCOSE SERPL-MCNC: 90 MG/DL (ref 70–110)
GLUCOSE UR QL STRIP: NEGATIVE
HCT VFR BLD AUTO: 48.6 % (ref 40–54)
HGB BLD-MCNC: 17.1 GM/DL (ref 14–18)
HGB UR QL STRIP: NEGATIVE
IMM GRANULOCYTES # BLD AUTO: 0.03 K/UL (ref 0–0.04)
IMM GRANULOCYTES NFR BLD AUTO: 0.4 % (ref 0–0.5)
KETONES UR QL STRIP: NEGATIVE
LEUKOCYTE ESTERASE UR QL STRIP: NEGATIVE
LIPASE SERPL-CCNC: 15 U/L (ref 4–60)
LYMPHOCYTES # BLD AUTO: 2.87 K/UL (ref 1–4.8)
MCH RBC QN AUTO: 31.1 PG (ref 27–31)
MCHC RBC AUTO-ENTMCNC: 35.2 G/DL (ref 32–36)
MCV RBC AUTO: 88 FL (ref 82–98)
NITRITE UR QL STRIP: NEGATIVE
NUCLEATED RBC (/100WBC) (OHS): 0 /100 WBC
PH UR STRIP: 7 [PH]
PLATELET # BLD AUTO: 346 K/UL (ref 150–450)
PMV BLD AUTO: 9.1 FL (ref 9.2–12.9)
POTASSIUM SERPL-SCNC: 4.4 MMOL/L (ref 3.5–5.1)
PROT SERPL-MCNC: 7.9 GM/DL (ref 6–8.4)
PROT UR QL STRIP: NEGATIVE
RBC # BLD AUTO: 5.5 M/UL (ref 4.6–6.2)
RELATIVE EOSINOPHIL (OHS): 2.9 %
RELATIVE LYMPHOCYTE (OHS): 35 % (ref 18–48)
RELATIVE MONOCYTE (OHS): 6.2 % (ref 4–15)
RELATIVE NEUTROPHIL (OHS): 54.4 % (ref 38–73)
SODIUM SERPL-SCNC: 140 MMOL/L (ref 136–145)
SP GR UR STRIP: <1.005
UROBILINOGEN UR STRIP-ACNC: NEGATIVE EU/DL
WBC # BLD AUTO: 8.2 K/UL (ref 3.9–12.7)

## 2025-08-12 PROCEDURE — 96361 HYDRATE IV INFUSION ADD-ON: CPT

## 2025-08-12 PROCEDURE — 83690 ASSAY OF LIPASE: CPT | Performed by: NURSE PRACTITIONER

## 2025-08-12 PROCEDURE — 85025 COMPLETE CBC W/AUTO DIFF WBC: CPT | Performed by: NURSE PRACTITIONER

## 2025-08-12 PROCEDURE — 25000003 PHARM REV CODE 250: Performed by: NURSE PRACTITIONER

## 2025-08-12 PROCEDURE — 84075 ASSAY ALKALINE PHOSPHATASE: CPT | Performed by: NURSE PRACTITIONER

## 2025-08-12 PROCEDURE — 80307 DRUG TEST PRSMV CHEM ANLYZR: CPT | Performed by: EMERGENCY MEDICINE

## 2025-08-12 PROCEDURE — 81003 URINALYSIS AUTO W/O SCOPE: CPT | Performed by: NURSE PRACTITIONER

## 2025-08-12 PROCEDURE — 96374 THER/PROPH/DIAG INJ IV PUSH: CPT

## 2025-08-12 PROCEDURE — 63600175 PHARM REV CODE 636 W HCPCS: Performed by: NURSE PRACTITIONER

## 2025-08-12 PROCEDURE — 99285 EMERGENCY DEPT VISIT HI MDM: CPT | Mod: 25

## 2025-08-12 RX ORDER — ONDANSETRON HYDROCHLORIDE 2 MG/ML
4 INJECTION, SOLUTION INTRAVENOUS
Status: COMPLETED | OUTPATIENT
Start: 2025-08-12 | End: 2025-08-12

## 2025-08-12 RX ORDER — SODIUM CHLORIDE 9 MG/ML
1000 INJECTION, SOLUTION INTRAVENOUS
Status: COMPLETED | OUTPATIENT
Start: 2025-08-12 | End: 2025-08-12

## 2025-08-12 RX ADMIN — SODIUM CHLORIDE 1000 ML: 9 INJECTION, SOLUTION INTRAVENOUS at 10:08

## 2025-08-12 RX ADMIN — ONDANSETRON 4 MG: 2 INJECTION INTRAMUSCULAR; INTRAVENOUS at 10:08

## 2025-08-13 VITALS
OXYGEN SATURATION: 98 % | SYSTOLIC BLOOD PRESSURE: 125 MMHG | HEART RATE: 80 BPM | BODY MASS INDEX: 26.4 KG/M2 | RESPIRATION RATE: 16 BRPM | DIASTOLIC BLOOD PRESSURE: 80 MMHG | TEMPERATURE: 99 F | WEIGHT: 211.19 LBS

## 2025-08-13 LAB
AMPHET UR QL SCN: NEGATIVE
BARBITURATE SCN PRESENT UR: NEGATIVE
BENZODIAZ UR QL SCN: NEGATIVE
CANNABINOIDS UR QL SCN: NEGATIVE
COCAINE UR QL SCN: NEGATIVE
CREAT UR-MCNC: 17.1 MG/DL (ref 23–375)
METHADONE UR QL SCN: NEGATIVE
OPIATES UR QL SCN: NEGATIVE
PCP UR QL: NEGATIVE

## 2025-08-13 PROCEDURE — 63600175 PHARM REV CODE 636 W HCPCS: Mod: JZ,TB | Performed by: EMERGENCY MEDICINE

## 2025-08-13 PROCEDURE — 96375 TX/PRO/DX INJ NEW DRUG ADDON: CPT

## 2025-08-13 RX ORDER — KETOROLAC TROMETHAMINE 30 MG/ML
15 INJECTION, SOLUTION INTRAMUSCULAR; INTRAVENOUS
Status: COMPLETED | OUTPATIENT
Start: 2025-08-13 | End: 2025-08-13

## 2025-08-13 RX ORDER — ONDANSETRON 4 MG/1
4 TABLET, ORALLY DISINTEGRATING ORAL EVERY 8 HOURS PRN
Qty: 10 TABLET | Refills: 0 | Status: SHIPPED | OUTPATIENT
Start: 2025-08-13

## 2025-08-13 RX ADMIN — KETOROLAC TROMETHAMINE 15 MG: 30 INJECTION, SOLUTION INTRAMUSCULAR at 12:08

## 2025-08-14 LAB — HOLD SPECIMEN: NORMAL

## (undated) DEVICE — GAUZE SPONGE 4X4 12PLY

## (undated) DEVICE — NDL PNEUMO INSUFFLATI 120MM

## (undated) DEVICE — GOWN POLY REINF BRTH SLV XL

## (undated) DEVICE — APPLICATOR STRL COT 2INNR 6IN

## (undated) DEVICE — COVER LIGHT HANDLE 80/CA

## (undated) DEVICE — DEVICE CLOSURE DISP 14G

## (undated) DEVICE — SEAL SCOPE WARMER 20/BX

## (undated) DEVICE — SYR 30CC LUER LOCK

## (undated) DEVICE — PACK BASIC SETUP SC BR

## (undated) DEVICE — MANIFOLD 4 PORT

## (undated) DEVICE — APPLICATOR CHLORAPREP ORN 26ML

## (undated) DEVICE — SUT VICRYL 3-0 27 SH

## (undated) DEVICE — Device

## (undated) DEVICE — SUT VICRYL 0 27 CT-2

## (undated) DEVICE — SUT MONOCRYL 4.0 PS2 CP496G

## (undated) DEVICE — SUT VICRYL PLUS 3-0 SH 18IN

## (undated) DEVICE — STAPLER ECHELON PWR CIR 29MM

## (undated) DEVICE — TROCAR ENDOPATH XCEL 5X100MM

## (undated) DEVICE — SOL NS 1000CC

## (undated) DEVICE — CANNULA ENDOPATH XCEL 5X100MM

## (undated) DEVICE — TROCAR KII BLLN 12MM 10CM

## (undated) DEVICE — SEALER LIGASURE LAP 37CM 5MM

## (undated) DEVICE — CART STAPLE FLEX ETX 3.5MM BLU

## (undated) DEVICE — IRRIGATOR ENDOSCOPY DISP.

## (undated) DEVICE — DEVICE RESOLUTION 360 CLIP

## (undated) DEVICE — SYR 3CC LUER LOC

## (undated) DEVICE — TRAY CATH FOL SIL URIMTR 16FR

## (undated) DEVICE — TIP GRASPER FENESTRATED DISP

## (undated) DEVICE — SET IRR URLGY 2LINE UNIV SPIKE

## (undated) DEVICE — STAPLER SKIN PROXIMATE WIDE

## (undated) DEVICE — PAD PINK TRENDELENBURG POS XL

## (undated) DEVICE — SUT CTD VICRYL 0 UND BR SUT

## (undated) DEVICE — GLOVE BIOGEL ECLIPSE SZ 7.5

## (undated) DEVICE — ELECTRODE REM PLYHSV RETURN 9

## (undated) DEVICE — TAPE SILK 3IN

## (undated) DEVICE — DRAPE ABDOMINAL TIBURON 14X11

## (undated) DEVICE — TAPE MEDIPORE 4IN X 2YDS

## (undated) DEVICE — PACK DRAPE PERI/GYN TIBURON

## (undated) DEVICE — CORD LAP 10 DISP

## (undated) DEVICE — SUT PDS II 1 TP-1 VIL

## (undated) DEVICE — NDL SAFETY 22G X 1.5 ECLIPSE

## (undated) DEVICE — STAPLER INT LINEAR ARTC 3.5-45

## (undated) DEVICE — KIT ANTIFOG W/SPONG & FLUID

## (undated) DEVICE — TOWEL OR DISP STRL BLUE 4/PK